# Patient Record
Sex: MALE | Race: WHITE | NOT HISPANIC OR LATINO | URBAN - METROPOLITAN AREA
[De-identification: names, ages, dates, MRNs, and addresses within clinical notes are randomized per-mention and may not be internally consistent; named-entity substitution may affect disease eponyms.]

---

## 2023-05-02 ENCOUNTER — EMERGENCY (EMERGENCY)
Facility: HOSPITAL | Age: 88
LOS: 1 days | Discharge: HOME | End: 2023-05-02
Attending: EMERGENCY MEDICINE | Admitting: EMERGENCY MEDICINE
Payer: MEDICARE

## 2023-05-02 VITALS
SYSTOLIC BLOOD PRESSURE: 136 MMHG | DIASTOLIC BLOOD PRESSURE: 71 MMHG | HEART RATE: 75 BPM | TEMPERATURE: 98 F | OXYGEN SATURATION: 96 % | RESPIRATION RATE: 18 BRPM

## 2023-05-02 VITALS
HEART RATE: 87 BPM | OXYGEN SATURATION: 96 % | DIASTOLIC BLOOD PRESSURE: 74 MMHG | SYSTOLIC BLOOD PRESSURE: 119 MMHG | HEIGHT: 70 IN | WEIGHT: 145.06 LBS | TEMPERATURE: 98 F | RESPIRATION RATE: 18 BRPM

## 2023-05-02 DIAGNOSIS — R42 DIZZINESS AND GIDDINESS: ICD-10-CM

## 2023-05-02 DIAGNOSIS — J98.11 ATELECTASIS: ICD-10-CM

## 2023-05-02 DIAGNOSIS — R55 SYNCOPE AND COLLAPSE: ICD-10-CM

## 2023-05-02 LAB
ALBUMIN SERPL ELPH-MCNC: 3.5 G/DL — SIGNIFICANT CHANGE UP (ref 3.3–5)
ALP SERPL-CCNC: 127 U/L — HIGH (ref 40–120)
ALT FLD-CCNC: 9 U/L — LOW (ref 10–45)
ANION GAP SERPL CALC-SCNC: 10 MMOL/L — SIGNIFICANT CHANGE UP (ref 5–17)
APPEARANCE UR: CLEAR — SIGNIFICANT CHANGE UP
AST SERPL-CCNC: 15 U/L — SIGNIFICANT CHANGE UP (ref 10–40)
BASOPHILS # BLD AUTO: 0.02 K/UL — SIGNIFICANT CHANGE UP (ref 0–0.2)
BASOPHILS NFR BLD AUTO: 0.2 % — SIGNIFICANT CHANGE UP (ref 0–2)
BILIRUB SERPL-MCNC: 0.3 MG/DL — SIGNIFICANT CHANGE UP (ref 0.2–1.2)
BILIRUB UR-MCNC: NEGATIVE — SIGNIFICANT CHANGE UP
BUN SERPL-MCNC: 16 MG/DL — SIGNIFICANT CHANGE UP (ref 7–23)
CALCIUM SERPL-MCNC: 9 MG/DL — SIGNIFICANT CHANGE UP (ref 8.4–10.5)
CHLORIDE SERPL-SCNC: 105 MMOL/L — SIGNIFICANT CHANGE UP (ref 96–108)
CO2 SERPL-SCNC: 21 MMOL/L — LOW (ref 22–31)
COLOR SPEC: YELLOW — SIGNIFICANT CHANGE UP
CREAT SERPL-MCNC: 1.03 MG/DL — SIGNIFICANT CHANGE UP (ref 0.5–1.3)
DIFF PNL FLD: NEGATIVE — SIGNIFICANT CHANGE UP
EGFR: 69 ML/MIN/1.73M2 — SIGNIFICANT CHANGE UP
EOSINOPHIL # BLD AUTO: 0.05 K/UL — SIGNIFICANT CHANGE UP (ref 0–0.5)
EOSINOPHIL NFR BLD AUTO: 0.4 % — SIGNIFICANT CHANGE UP (ref 0–6)
GLUCOSE SERPL-MCNC: 121 MG/DL — HIGH (ref 70–99)
GLUCOSE UR QL: NEGATIVE — SIGNIFICANT CHANGE UP
HCT VFR BLD CALC: 33.9 % — LOW (ref 39–50)
HGB BLD-MCNC: 11.1 G/DL — LOW (ref 13–17)
IMM GRANULOCYTES NFR BLD AUTO: 0.6 % — SIGNIFICANT CHANGE UP (ref 0–0.9)
KETONES UR-MCNC: NEGATIVE — SIGNIFICANT CHANGE UP
LEUKOCYTE ESTERASE UR-ACNC: NEGATIVE — SIGNIFICANT CHANGE UP
LYMPHOCYTES # BLD AUTO: 0.51 K/UL — LOW (ref 1–3.3)
LYMPHOCYTES # BLD AUTO: 4.2 % — LOW (ref 13–44)
MCHC RBC-ENTMCNC: 32.7 GM/DL — SIGNIFICANT CHANGE UP (ref 32–36)
MCHC RBC-ENTMCNC: 33.4 PG — SIGNIFICANT CHANGE UP (ref 27–34)
MCV RBC AUTO: 102.1 FL — HIGH (ref 80–100)
MONOCYTES # BLD AUTO: 1.08 K/UL — HIGH (ref 0–0.9)
MONOCYTES NFR BLD AUTO: 8.8 % — SIGNIFICANT CHANGE UP (ref 2–14)
NEUTROPHILS # BLD AUTO: 10.52 K/UL — HIGH (ref 1.8–7.4)
NEUTROPHILS NFR BLD AUTO: 85.8 % — HIGH (ref 43–77)
NITRITE UR-MCNC: NEGATIVE — SIGNIFICANT CHANGE UP
NRBC # BLD: 0 /100 WBCS — SIGNIFICANT CHANGE UP (ref 0–0)
PH UR: 5.5 — SIGNIFICANT CHANGE UP (ref 5–8)
PLATELET # BLD AUTO: 219 K/UL — SIGNIFICANT CHANGE UP (ref 150–400)
POTASSIUM SERPL-MCNC: 4.5 MMOL/L — SIGNIFICANT CHANGE UP (ref 3.5–5.3)
POTASSIUM SERPL-SCNC: 4.5 MMOL/L — SIGNIFICANT CHANGE UP (ref 3.5–5.3)
PROT SERPL-MCNC: 5.7 G/DL — LOW (ref 6–8.3)
PROT UR-MCNC: NEGATIVE MG/DL — SIGNIFICANT CHANGE UP
RBC # BLD: 3.32 M/UL — LOW (ref 4.2–5.8)
RBC # FLD: 15.9 % — HIGH (ref 10.3–14.5)
SODIUM SERPL-SCNC: 136 MMOL/L — SIGNIFICANT CHANGE UP (ref 135–145)
SP GR SPEC: >=1.03 — SIGNIFICANT CHANGE UP (ref 1–1.03)
TROPONIN T SERPL-MCNC: 0.01 NG/ML — SIGNIFICANT CHANGE UP (ref 0–0.01)
UROBILINOGEN FLD QL: 0.2 E.U./DL — SIGNIFICANT CHANGE UP
WBC # BLD: 12.25 K/UL — HIGH (ref 3.8–10.5)
WBC # FLD AUTO: 12.25 K/UL — HIGH (ref 3.8–10.5)

## 2023-05-02 PROCEDURE — 71045 X-RAY EXAM CHEST 1 VIEW: CPT | Mod: 26

## 2023-05-02 PROCEDURE — 99285 EMERGENCY DEPT VISIT HI MDM: CPT | Mod: 25

## 2023-05-02 PROCEDURE — 71045 X-RAY EXAM CHEST 1 VIEW: CPT

## 2023-05-02 PROCEDURE — 85025 COMPLETE CBC W/AUTO DIFF WBC: CPT

## 2023-05-02 PROCEDURE — 81003 URINALYSIS AUTO W/O SCOPE: CPT

## 2023-05-02 PROCEDURE — 84484 ASSAY OF TROPONIN QUANT: CPT

## 2023-05-02 PROCEDURE — 93005 ELECTROCARDIOGRAM TRACING: CPT

## 2023-05-02 PROCEDURE — 80053 COMPREHEN METABOLIC PANEL: CPT

## 2023-05-02 PROCEDURE — 36415 COLL VENOUS BLD VENIPUNCTURE: CPT

## 2023-05-02 PROCEDURE — 99284 EMERGENCY DEPT VISIT MOD MDM: CPT

## 2023-05-02 RX ORDER — SODIUM CHLORIDE 9 MG/ML
500 INJECTION INTRAMUSCULAR; INTRAVENOUS; SUBCUTANEOUS ONCE
Refills: 0 | Status: COMPLETED | OUTPATIENT
Start: 2023-05-02 | End: 2023-05-02

## 2023-05-02 RX ADMIN — SODIUM CHLORIDE 500 MILLILITER(S): 9 INJECTION INTRAMUSCULAR; INTRAVENOUS; SUBCUTANEOUS at 16:34

## 2023-05-02 RX ADMIN — SODIUM CHLORIDE 500 MILLILITER(S): 9 INJECTION INTRAMUSCULAR; INTRAVENOUS; SUBCUTANEOUS at 14:16

## 2023-05-02 NOTE — PROGRESS NOTE ADULT - SUBJECTIVE AND OBJECTIVE BOX
Electrophysiology Device Interrogation       Indication: syncope    Device model: 	Medtronic Advisa dual chamber pacemaker			                            Functioning Mode: AAIR mode swittch DDDR     Underlying Rhythm:  sinus bradycardia    Pacemaker dependency: not pacemaker dependent. underlying rhythm is sinus bradycardia ~35bpm. A paced 84%  <1%    Battery status: 3 years remaining    Interrogating parameters:   				RA			RV	  Sense:                                     2.9mV                         4.1mV  Threshold:                           0.25V@0.4ms               0.5V@0.4ms                                                                      Pacing Impedance:                  323 ohms                     380 ohms                                                                     Events/Alert:  no events.    Parameter change: 	none     Assessment: normal functioning dual chamber pacemaker. underlying rhythm is sinus bradycardia. Per patient, since moving from [place] he has not had ppm checked (last check Dec 2022). If patient wants to follow up with our EP office as an outpatient can provide him with our office number: [phone]

## 2023-05-02 NOTE — ED PROVIDER NOTE - NSFOLLOWUPINSTRUCTIONS_ED_ALL_ED_FT
[facility]  Family Medicine  [address], [address] [place] [address]  Phone: [phone]      Can take tylenol 650mg every 6hrs as needed for mild pain.  Stay well hydrated.  Return for fevers, persistent vomit, uncontrolled pain, worsening breathing, worsening lightheaded.  Follow up with primary doctor within 1-2 days.   Follow up with cardiologist. Can call [phone] ([facility]) to schedule appointment.     Syncope    Syncope refers to a condition in which a person temporarily loses consciousness. Syncope may also be called fainting or passing out. It is caused by a sudden decrease in blood flow to the brain. Even though most causes of syncope are not dangerous, syncope can be a sign of a serious medical problem. Your health care provider may do tests to find the reason why you are having syncope.    Signs that you may be about to faint include:  Feeling dizzy or light-headed.  Feeling nauseous.  Seeing all white or all black in your field of vision.  Having cold, clammy skin.  If you faint, get medical help right away.   Call your local emergency services (911 in the [place] Do not drive yourself to the hospital.    Follow these instructions at home:  Pay attention to any changes in your symptoms. Take these actions to stay safe and to help relieve your symptoms:    Lifestyle     Do not drive, use machinery, or play sports until your health care provider says it is okay. Do not drink alcohol. Do not use any products that contain nicotine or tobacco, such as cigarettes and e-cigarettes. If you need help quitting, ask your health care provider. Drink enough fluid to keep your urine pale yellow.    General instructions     Take over-the-counter and prescription medicines only as told by your health care provider. If you are taking blood pressure or heart medicine, get up slowly and take several minutes to sit and then stand. This can reduce dizziness or light-headedness. Have someone stay with you until you feel stable. If you start to feel like you might faint, lie down right away and raise (elevate) your feet above the level of your heart. Breathe deeply and steadily. Wait until all the symptoms have passed. Keep all follow-up visits as told by your health care provider. This is important.   Get help right away if you:  Have a severe headache.  Faint once or repeatedly.  Have pain in your chest, abdomen, or back.  Have a very fast or irregular heartbeat (palpitations).  Have pain when you breathe.  Are bleeding from your mouth or rectum, or you have black or tarry stool.  Have a seizure.  Are confused.  Have trouble walking.  Have severe weakness.  Have vision problems.  These symptoms may represent a serious problem that is an emergency. Do not wait to see if your symptoms will go away. Get medical help right away. Call your local emergency services (911 in the [place] Do not drive yourself to the hospital.

## 2023-05-02 NOTE — ED ADULT TRIAGE NOTE - OTHER COMPLAINTS
per EMS was found on the toilet, "passed out." pt denies falling, Cp or SOB. pt coming from assissted living

## 2023-05-02 NOTE — ED ADULT NURSE NOTE - INTERVENTIONS DEFINITIONS
[place] to call system/Call bell, personal items and telephone within reach/Instruct patient to call for assistance/Physically safe environment: no spills, clutter or unnecessary equipment/Provide visual cue, wrist band, yellow gown, etc.

## 2023-05-02 NOTE — ED PROVIDER NOTE - OBJECTIVE STATEMENT
89 yo pt poor historian w episode of syncope pta, pt states was feeling dizzy prior to passing out. Pt states was straining after which syncope occurred. Denies head injury, denies cp, Denies associated SOB, NVD,diaphoresis, palpitations, LE pain/swelling, focal weakness/numbness, recent travel/immobilization, abd pain, urinary complaints, f/c. Denies hitting head, LOC, vision changes, focal weakness/numbness, neck/back pain, SOB/CP, HA, abd pain, NVD. 89 yo pt poor historian w episode of syncope pta, pt states was feeling dizzy prior to passing out. Pt states was straining on the toilet after which syncope occurred. Denies head injury, denies cp, Denies associated SOB, NVD,diaphoresis, palpitations, LE pain/swelling, focal weakness/numbness, recent travel/immobilization, abd pain, urinary complaints, f/c. Denies hitting head, LOC, vision changes, focal weakness/numbness, neck/back pain, SOB/CP, HA, abd pain, NVD.

## 2023-05-02 NOTE — ED ADULT NURSE NOTE - OBJECTIVE STATEMENT
Pt presenting s/p syncopal episode. States he was constipated and was straining on the toilet attempting to defecate. States he had a successful BM, felt dizzy and passed out. Denies head strike. Denies pain. No CP/dizziness at this time. EKG in process. Pt presenting s/p syncopal episode. States he was constipated and was straining on the toilet attempting to defecate. States he had a successful BM, felt dizzy and passed out. Denies head strike. c/o L leg pain. No abdominal pain- abdomen soft non tender non distended. No CP/dizziness at this time. EKG in process. Pt presenting s/p syncopal episode. States he was constipated and was straining on the toilet attempting to defecate. States he had a successful BM, felt dizzy and passed out. Denies head strike (small abrasion noted to top of head states does not think is from this fall).  c/o L leg pain. No abdominal pain- abdomen soft non tender non distended. No CP/dizziness at this time. EKG in process. ZACK.

## 2023-05-02 NOTE — ED ADULT TRIAGE NOTE - WEIGHT IN KG
Returning patient's voicemail message. Telephone call to patient and person who answered phone indicated patient left his cell phone at [employer]. Provided this person with contact information for patient's brother/Curly.
Telephone call with patient who indicated that he heard from [facility] and his elevated toilet seat is going to be delivered on Friday.
65.8

## 2023-05-02 NOTE — ED PROVIDER NOTE - CLINICAL SUMMARY MEDICAL DECISION MAKING FREE TEXT BOX
hpi as above, s/p episode of syncope wo head injury, well appearing, hds, no active chest pain, sob, ddx arrythmia, uti/pna, dehydration, vasovagal  -labs, ekg, EP eval for interrogation hpi as above, s/p episode of syncope wo head injury appearing as vasovagal given straining hx,, well appearing, hds, no active chest pain, sob, ddx arrythmia, uti/pna, dehydration, vasovagal  -labs, ekg, EP eval for interrogation, keep on tele for obs    evaluated by EP w no abnormalities, kept on tele wo arrhythmias during ED stays, labs grossly normal, well appearing, will dc w supportive care, strict return prec.

## 2023-05-02 NOTE — ED PROVIDER NOTE - PATIENT PORTAL LINK FT
You can access the FollowMyHealth Patient Portal offered by [facility] by registering at the following website: [email or web address]. By joining [facility] [facility]’s FollowMyHealth portal, you will also be able to view your health information using other applications (apps) compatible with our system.

## 2023-06-01 PROBLEM — Z00.00 ENCOUNTER FOR PREVENTIVE HEALTH EXAMINATION: Status: ACTIVE | Noted: 2023-06-01

## 2023-06-13 ENCOUNTER — OUTPATIENT (OUTPATIENT)
Dept: OUTPATIENT SERVICES | Facility: HOSPITAL | Age: 88
LOS: 1 days | Discharge: HOME | End: 2023-06-13
Payer: MEDICARE

## 2023-06-13 DIAGNOSIS — C90.00 MULTIPLE MYELOMA NOT HAVING ACHIEVED REMISSION: ICD-10-CM

## 2023-06-14 ENCOUNTER — RESULT REVIEW (OUTPATIENT)
Age: 88
End: 2023-06-14

## 2023-06-14 PROCEDURE — 88321 CONSLTJ&REPRT SLD PREP ELSWR: CPT

## 2023-06-19 LAB — HEMATOPATHOLOGY REPORT: SIGNIFICANT CHANGE UP

## 2023-06-21 ENCOUNTER — APPOINTMENT (OUTPATIENT)
Dept: HEMATOLOGY ONCOLOGY | Facility: CLINIC | Age: 88
End: 2023-06-21
Payer: MEDICARE

## 2023-06-21 ENCOUNTER — NON-APPOINTMENT (OUTPATIENT)
Age: 88
End: 2023-06-21

## 2023-06-21 VITALS
WEIGHT: 146.98 LBS | BODY MASS INDEX: 23.07 KG/M2 | OXYGEN SATURATION: 98 % | SYSTOLIC BLOOD PRESSURE: 147 MMHG | RESPIRATION RATE: 18 BRPM | HEART RATE: 69 BPM | TEMPERATURE: 98.1 F | DIASTOLIC BLOOD PRESSURE: 77 MMHG | HEIGHT: 67 IN

## 2023-06-21 DIAGNOSIS — M10.9 GOUT, UNSPECIFIED: ICD-10-CM

## 2023-06-21 DIAGNOSIS — K59.00 CONSTIPATION, UNSPECIFIED: ICD-10-CM

## 2023-06-21 DIAGNOSIS — Z78.9 OTHER SPECIFIED HEALTH STATUS: ICD-10-CM

## 2023-06-21 PROCEDURE — 99205 OFFICE O/P NEW HI 60 MIN: CPT

## 2023-06-21 RX ORDER — TAMSULOSIN HYDROCHLORIDE 0.4 MG/1
0.4 CAPSULE ORAL
Refills: 0 | Status: ACTIVE | COMMUNITY
Start: 2023-06-21

## 2023-06-21 RX ORDER — DOCUSATE SODIUM 100 MG/1
100 CAPSULE, LIQUID FILLED ORAL
Refills: 0 | Status: ACTIVE | COMMUNITY
Start: 2023-06-21

## 2023-06-21 RX ORDER — MULTIVITAMIN
TABLET ORAL DAILY
Qty: 30 | Refills: 0 | Status: ACTIVE | COMMUNITY
Start: 2023-06-21

## 2023-06-21 RX ORDER — LORATADINE 10 MG
17 TABLET,DISINTEGRATING ORAL
Refills: 0 | Status: ACTIVE | COMMUNITY
Start: 2023-06-21

## 2023-06-21 NOTE — HISTORY OF PRESENT ILLNESS
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 He had uptrending paraprotein markers (M-spike 1.6, K/L 229, IgG 2057, UPEP with 260 mg monoclonal protein in 24 hours) in 2/2023, concerning for progression to multiple myeloma. He was admitted to the hospital for hypercalcemia (treated with IVF and calcitonin) and BORIS (Cr 1.71). Bone marrow biopsy on 3/27/23 revealed "extensive interstitial and diffuse bone marrow involvement" with 87% plasma cells, indicating progression. FISH panel was notable for gain (1q), monosomy(13), and gain of chromosomes 5, 9, 11, and 15 (consistent with hyperdiploidy). PET/CT on 3/21/23 was notable for a right sacral lytic lesion with extraosseous soft tissue mass (6.6 x 4.9 cm, SUV 5.5), a T9 compression deformity, subacute fracture of the left lateral 7th rib, and acute fracture of the left posterolateral 8th rib. \par \par He was started on Helen-Vd (daratumumab SC, Velcade 1 mg/m2 SC, dexamethasone 20 mg) on 3/22/23 with Valtrex prophylaxis. Per his son, he was planned to receive 10 weekly treatments, followed by monthly dosing. He received approximately 6 treatments, last on 6/5/23.\par \par His most recent labs from 5/30/23 showed mild normocytic anemia (Hgb 12.7), normal renal function (Cr 0.98), low calcium (8.1), and normal albumin (3.7). Paraprotein markers on 5/9/23 showed an IgG kappa monoclonal protein (M-spike 0.5), elevated K/L ratio (5.43), and normal IgG level (665) with immunoparesis (IgA 57, IgM 17). \par \par Family History:\par - No history of hematologic disorders or cancer\par \par Social History:\par - Recently moved to an assisted living facility ([facility])\par - Denies tobacco, alcohol, or drug use.  \par \par Review of Systems:\par Constitutional: Denies fever/chills, night sweats, unintentional weight loss, lymphadenopathy, fatigue\par HEENT: Denies vision or hearing changes\par Cardiovascular: Denies chest pain and palpitations\par Respiratory: Denies shortness of breath, cough, dyspnea on exertion\par GI: Denies nausea, vomiting, diarrhea, constipation, or abdominal pain\par : Denies urinary frequency or dysuria\par Hematologic: Denies easy bruising or bleeding, epistaxis, gingival bleeding, hematemesis, hematochezia, melena, or hematuria\par Musculoskeletal: Denies muscle/joint pain or weakness\par Neurologic: Denies headaches, weakness, numbness\par Skin: Denies rash and pruritis\par Psych: Denies recent changes in mood

## 2023-06-21 NOTE — ASSESSMENT
[FreeTextEntry1] : TRISTAN MCGRATH is a 89 year man with PMH of gout, HTN, BPH, and narcolepsy, who presents for Hematology evaluation of IgG kappa multiple myeloma.\par \par 1. IgG kappa multiple myeloma: He was previously followed at [facility] for IgG kappa MGUS. He had uptrending paraprotein markers (M-spike 1.6, K/L 229, IgG 2057, UPEP with 260 mg monoclonal protein in 24 hous) in 2/2023, concerning for progression to multiple myeloma. He was admitted to the hospital for hypercalcemia (treated with IVF and calcitonin) and BORIS (Cr 1.71). Bone marrow biopsy on 3/27/23 revealed "extensive interstitial and diffuse bone marrow involvement" with 87% plasma cells, indicating progression. FISH panel was notable for gain (1q), monosomy(13), and gain of chromosomes 5, 9, 11, and 15 (consistent with hyperdiploidy). \par - Staging: He has high-risk features, including gain(1q) and renal injury at diagnosis\par - Induction therapy: He was started on Helen-Vd (daratumumab SC, Velcade 1 mg/m2 SC, dexamethasone 20 mg) on 3/22/23 with Valtrex prophylaxis. Per his son, he was planned to receive 10 weekly treatments, followed by monthly dosing. He received approximately 6 treatments, last on 6/5/23.\par - Treatment plan: We will plan to transition to Helen-Rd per the IALNA trial as his renal function has now normalized. Will start with daratumumab biweekly given prior treatment.\par --- Daratumumab SC biweekly x 8 (C1-4), then monthly\par --- Revlimid 10 mg D1-21 (enrolled in [facility] today)\par --- Dexamethasone 10 mg \par - Monitor MM labs (SPEP, JEREL, free light chains, immunoglobulins) monthly\par - Monitor CBC and CMP weekly with treatment\par \par 2. Bones: PET/CT on 3/21/23 was notable for a right sacral lytic lesion with extraosseous soft tissue mass (6.6 x 4.9 cm, SUV 5.5), a T9 compression deformity, subacute fracture of the left lateral 7th rib, and acute fracture of the left posterolateral 8th rib. \par - Vitamin D: will check with next set of labs\par - Anti-resorptive therapy: Pending dental evaluation\par \par 3. Kidney: \par - Creatinine: 0.98 (5/30/23)\par - UPEP/JEREL: 260 mg/24 hours (2/2023)\par - Avoid nephrotoxic agents\par \par 4. Hematology: \par - CBC: Hgb 12.7 (5/30/23)\par - Monitor CBC weekly with treatment\par \par 5. VTE: No history of VTE. \par - Start aspirin 81 mg daily for prophylaxis with Revlimid treatment\par \par 6. Infections: No recent or recurrent infections. He has evidence of immunoparesis (low IgA and IgM).\par - IgG level: 665 (5/30/23)\par - Continue valacyclovir 500 mg daily for antiviral prophylaxis\par \par 7. Psych: History of narcolepsy, now off medications for unclear reasons\par - Psych referral per patient and son request

## 2023-06-26 ENCOUNTER — RESULT REVIEW (OUTPATIENT)
Age: 88
End: 2023-06-26

## 2023-06-26 ENCOUNTER — OUTPATIENT (OUTPATIENT)
Dept: OUTPATIENT SERVICES | Facility: HOSPITAL | Age: 88
LOS: 1 days | End: 2023-06-26
Payer: MEDICARE

## 2023-06-26 ENCOUNTER — APPOINTMENT (OUTPATIENT)
Dept: INFUSION THERAPY | Facility: HOSPITAL | Age: 88
End: 2023-06-26

## 2023-06-26 ENCOUNTER — APPOINTMENT (OUTPATIENT)
Dept: HEMATOLOGY ONCOLOGY | Facility: CLINIC | Age: 88
End: 2023-06-26

## 2023-06-26 DIAGNOSIS — C90.00 MULTIPLE MYELOMA NOT HAVING ACHIEVED REMISSION: ICD-10-CM

## 2023-06-26 LAB
BASOPHILS # BLD AUTO: 0.01 K/UL — SIGNIFICANT CHANGE UP (ref 0–0.2)
BASOPHILS NFR BLD AUTO: 0.2 % — SIGNIFICANT CHANGE UP (ref 0–2)
EOSINOPHIL # BLD AUTO: 0.14 K/UL — SIGNIFICANT CHANGE UP (ref 0–0.5)
EOSINOPHIL NFR BLD AUTO: 2.1 % — SIGNIFICANT CHANGE UP (ref 0–6)
HCT VFR BLD CALC: 39 % — SIGNIFICANT CHANGE UP (ref 39–50)
HGB BLD-MCNC: 12.8 G/DL — LOW (ref 13–17)
IMM GRANULOCYTES NFR BLD AUTO: 0.6 % — SIGNIFICANT CHANGE UP (ref 0–0.9)
LYMPHOCYTES # BLD AUTO: 0.82 K/UL — LOW (ref 1–3.3)
LYMPHOCYTES # BLD AUTO: 12.3 % — LOW (ref 13–44)
MCHC RBC-ENTMCNC: 32.8 G/DL — SIGNIFICANT CHANGE UP (ref 32–36)
MCHC RBC-ENTMCNC: 32.9 PG — SIGNIFICANT CHANGE UP (ref 27–34)
MCV RBC AUTO: 100.3 FL — HIGH (ref 80–100)
MONOCYTES # BLD AUTO: 0.91 K/UL — HIGH (ref 0–0.9)
MONOCYTES NFR BLD AUTO: 13.7 % — SIGNIFICANT CHANGE UP (ref 2–14)
NEUTROPHILS # BLD AUTO: 4.73 K/UL — SIGNIFICANT CHANGE UP (ref 1.8–7.4)
NEUTROPHILS NFR BLD AUTO: 71.1 % — SIGNIFICANT CHANGE UP (ref 43–77)
NRBC # BLD: 0 /100 WBCS — SIGNIFICANT CHANGE UP (ref 0–0)
PLATELET # BLD AUTO: 219 K/UL — SIGNIFICANT CHANGE UP (ref 150–400)
RBC # BLD: 3.89 M/UL — LOW (ref 4.2–5.8)
RBC # FLD: 13.6 % — SIGNIFICANT CHANGE UP (ref 10.3–14.5)
WBC # BLD: 6.65 K/UL — SIGNIFICANT CHANGE UP (ref 3.8–10.5)
WBC # FLD AUTO: 6.65 K/UL — SIGNIFICANT CHANGE UP (ref 3.8–10.5)

## 2023-06-26 PROCEDURE — 86880 COOMBS TEST DIRECT: CPT

## 2023-06-26 PROCEDURE — 86922 COMPATIBILITY TEST ANTIGLOB: CPT

## 2023-06-26 PROCEDURE — 86850 RBC ANTIBODY SCREEN: CPT

## 2023-06-26 PROCEDURE — 86870 RBC ANTIBODY IDENTIFICATION: CPT

## 2023-06-26 PROCEDURE — 86901 BLOOD TYPING SEROLOGIC RH(D): CPT

## 2023-06-26 PROCEDURE — 86900 BLOOD TYPING SEROLOGIC ABO: CPT

## 2023-06-26 PROCEDURE — 86905 BLOOD TYPING RBC ANTIGENS: CPT

## 2023-06-26 PROCEDURE — 86970 RBC PRETX INCUBATJ W/CHEMICL: CPT

## 2023-06-27 DIAGNOSIS — R11.2 NAUSEA WITH VOMITING, UNSPECIFIED: ICD-10-CM

## 2023-06-27 DIAGNOSIS — Z51.11 ENCOUNTER FOR ANTINEOPLASTIC CHEMOTHERAPY: ICD-10-CM

## 2023-06-27 LAB
25(OH)D3 SERPL-MCNC: 30 NG/ML
ALBUMIN MFR SERPL ELPH: 62.4 %
ALBUMIN SERPL ELPH-MCNC: 4.2 G/DL
ALBUMIN SERPL-MCNC: 3.9 G/DL
ALBUMIN/GLOB SERPL: 1.6 RATIO
ALP BLD-CCNC: 112 U/L
ALPHA1 GLOB MFR SERPL ELPH: 5.2 %
ALPHA1 GLOB SERPL ELPH-MCNC: 0.3 G/DL
ALPHA2 GLOB MFR SERPL ELPH: 12.3 %
ALPHA2 GLOB SERPL ELPH-MCNC: 0.8 G/DL
ALT SERPL-CCNC: 12 U/L
ANION GAP SERPL CALC-SCNC: 12 MMOL/L
AST SERPL-CCNC: 15 U/L
B-GLOBULIN MFR SERPL ELPH: 14.6 %
B-GLOBULIN SERPL ELPH-MCNC: 0.9 G/DL
B2 MICROGLOB SERPL-MCNC: 3.3 MG/L
BILIRUB SERPL-MCNC: 0.3 MG/DL
BUN SERPL-MCNC: 28 MG/DL
CALCIUM SERPL-MCNC: 8.6 MG/DL
CHLORIDE SERPL-SCNC: 105 MMOL/L
CO2 SERPL-SCNC: 23 MMOL/L
CREAT SERPL-MCNC: 1.09 MG/DL
DEPRECATED KAPPA LC FREE/LAMBDA SER: 8.58 RATIO
EGFR: 64 ML/MIN/1.73M2
GAMMA GLOB FLD ELPH-MCNC: 0.3 G/DL
GAMMA GLOB MFR SERPL ELPH: 5.5 %
GLUCOSE SERPL-MCNC: 117 MG/DL
HBV CORE IGG+IGM SER QL: NONREACTIVE
HBV SURFACE AB SER QL: REACTIVE
HBV SURFACE AG SER QL: NONREACTIVE
HCV AB SER QL: NONREACTIVE
HCV S/CO RATIO: 0.03 S/CO
IGA SER QL IEP: 54 MG/DL
IGG SER QL IEP: 657 MG/DL
IGM SER QL IEP: 18 MG/DL
INTERPRETATION SERPL IEP-IMP: NORMAL
KAPPA LC CSF-MCNC: 0.88 MG/DL
KAPPA LC SERPL-MCNC: 7.55 MG/DL
LDH SERPL-CCNC: 178 U/L
M PROTEIN MFR SERPL ELPH: NORMAL
M PROTEIN SPEC IFE-MCNC: NORMAL
MONOCLON BAND OBS SERPL: NORMAL
NT-PROBNP SERPL-MCNC: 113 PG/ML
POTASSIUM SERPL-SCNC: 4.9 MMOL/L
PROT SERPL-MCNC: 6.3 G/DL
SODIUM SERPL-SCNC: 139 MMOL/L
TROPONIN-T, HIGH SENSITIVITY: 15 NG/L
TSH SERPL-ACNC: 2.23 UIU/ML

## 2023-06-28 ENCOUNTER — NON-APPOINTMENT (OUTPATIENT)
Age: 88
End: 2023-06-28

## 2023-06-28 LAB
ALBUPE: 8.8 %
ALPHA1UPE: 37.3 %
ALPHA2UPE: 16.4 %
BETAUPE: 33.9 %
GAMMAUPE: 3.6 %
IGA 24H UR QL IFE: NORMAL
KAPPA LC 24H UR QL: PRESENT
M SPIKE RU: 22.8 %
PROT PATTERN 24H UR ELPH-IMP: NORMAL
PROT UR-MCNC: 14 MG/DL
PROT UR-MCNC: 14 MG/DL

## 2023-07-10 ENCOUNTER — RESULT REVIEW (OUTPATIENT)
Age: 88
End: 2023-07-10

## 2023-07-10 ENCOUNTER — APPOINTMENT (OUTPATIENT)
Dept: HEMATOLOGY ONCOLOGY | Facility: CLINIC | Age: 88
End: 2023-07-10

## 2023-07-10 ENCOUNTER — APPOINTMENT (OUTPATIENT)
Dept: INFUSION THERAPY | Facility: HOSPITAL | Age: 88
End: 2023-07-10

## 2023-07-10 LAB
BASOPHILS # BLD AUTO: 0.02 K/UL — SIGNIFICANT CHANGE UP (ref 0–0.2)
BASOPHILS NFR BLD AUTO: 0.3 % — SIGNIFICANT CHANGE UP (ref 0–2)
EOSINOPHIL # BLD AUTO: 0.2 K/UL — SIGNIFICANT CHANGE UP (ref 0–0.5)
EOSINOPHIL NFR BLD AUTO: 2.5 % — SIGNIFICANT CHANGE UP (ref 0–6)
HCT VFR BLD CALC: 40.2 % — SIGNIFICANT CHANGE UP (ref 39–50)
HGB BLD-MCNC: 13.3 G/DL — SIGNIFICANT CHANGE UP (ref 13–17)
IMM GRANULOCYTES NFR BLD AUTO: 0.5 % — SIGNIFICANT CHANGE UP (ref 0–0.9)
LYMPHOCYTES # BLD AUTO: 0.54 K/UL — LOW (ref 1–3.3)
LYMPHOCYTES # BLD AUTO: 6.8 % — LOW (ref 13–44)
MCHC RBC-ENTMCNC: 33.1 G/DL — SIGNIFICANT CHANGE UP (ref 32–36)
MCHC RBC-ENTMCNC: 33.2 PG — SIGNIFICANT CHANGE UP (ref 27–34)
MCV RBC AUTO: 100.2 FL — HIGH (ref 80–100)
MONOCYTES # BLD AUTO: 1.16 K/UL — HIGH (ref 0–0.9)
MONOCYTES NFR BLD AUTO: 14.6 % — HIGH (ref 2–14)
NEUTROPHILS # BLD AUTO: 6 K/UL — SIGNIFICANT CHANGE UP (ref 1.8–7.4)
NEUTROPHILS NFR BLD AUTO: 75.3 % — SIGNIFICANT CHANGE UP (ref 43–77)
NRBC # BLD: 0 /100 WBCS — SIGNIFICANT CHANGE UP (ref 0–0)
PLATELET # BLD AUTO: 182 K/UL — SIGNIFICANT CHANGE UP (ref 150–400)
RBC # BLD: 4.01 M/UL — LOW (ref 4.2–5.8)
RBC # FLD: 13.4 % — SIGNIFICANT CHANGE UP (ref 10.3–14.5)
WBC # BLD: 7.96 K/UL — SIGNIFICANT CHANGE UP (ref 3.8–10.5)
WBC # FLD AUTO: 7.96 K/UL — SIGNIFICANT CHANGE UP (ref 3.8–10.5)

## 2023-07-15 LAB
ALBUMIN MFR SERPL ELPH: 61.4 %
ALBUMIN SERPL ELPH-MCNC: 4.5 G/DL
ALBUMIN SERPL-MCNC: 3.8 G/DL
ALBUMIN/GLOB SERPL: 1.6 RATIO
ALP BLD-CCNC: 93 U/L
ALPHA1 GLOB MFR SERPL ELPH: 6 %
ALPHA1 GLOB SERPL ELPH-MCNC: 0.4 G/DL
ALPHA2 GLOB MFR SERPL ELPH: 12.3 %
ALPHA2 GLOB SERPL ELPH-MCNC: 0.8 G/DL
ALT SERPL-CCNC: 13 U/L
ANION GAP SERPL CALC-SCNC: 15 MMOL/L
AST SERPL-CCNC: 20 U/L
B-GLOBULIN MFR SERPL ELPH: 14.5 %
B-GLOBULIN SERPL ELPH-MCNC: 0.9 G/DL
BILIRUB SERPL-MCNC: 0.3 MG/DL
BUN SERPL-MCNC: 26 MG/DL
CALCIUM SERPL-MCNC: 8.7 MG/DL
CHLORIDE SERPL-SCNC: 104 MMOL/L
CO2 SERPL-SCNC: 20 MMOL/L
CREAT SERPL-MCNC: 1.24 MG/DL
DEPRECATED KAPPA LC FREE/LAMBDA SER: 4.06 RATIO
EGFR: 55 ML/MIN/1.73M2
GAMMA GLOB FLD ELPH-MCNC: 0.4 G/DL
GAMMA GLOB MFR SERPL ELPH: 5.8 %
GLUCOSE SERPL-MCNC: 128 MG/DL
IGA SER QL IEP: 49 MG/DL
IGG SER QL IEP: 629 MG/DL
IGM SER QL IEP: 27 MG/DL
INTERPRETATION SERPL IEP-IMP: NORMAL
KAPPA LC CSF-MCNC: 1.26 MG/DL
KAPPA LC SERPL-MCNC: 5.11 MG/DL
M PROTEIN MFR SERPL ELPH: NORMAL
M PROTEIN SPEC IFE-MCNC: NORMAL
MONOCLON BAND OBS SERPL: NORMAL
POTASSIUM SERPL-SCNC: 4.5 MMOL/L
PROT SERPL-MCNC: 6.2 G/DL
SODIUM SERPL-SCNC: 139 MMOL/L

## 2023-07-24 ENCOUNTER — RESULT REVIEW (OUTPATIENT)
Age: 88
End: 2023-07-24

## 2023-07-24 ENCOUNTER — APPOINTMENT (OUTPATIENT)
Dept: HEMATOLOGY ONCOLOGY | Facility: CLINIC | Age: 88
End: 2023-07-24
Payer: MEDICARE

## 2023-07-24 ENCOUNTER — APPOINTMENT (OUTPATIENT)
Dept: HEMATOLOGY ONCOLOGY | Facility: CLINIC | Age: 88
End: 2023-07-24

## 2023-07-24 ENCOUNTER — APPOINTMENT (OUTPATIENT)
Dept: INFUSION THERAPY | Facility: HOSPITAL | Age: 88
End: 2023-07-24

## 2023-07-24 VITALS
TEMPERATURE: 97.3 F | SYSTOLIC BLOOD PRESSURE: 110 MMHG | DIASTOLIC BLOOD PRESSURE: 58 MMHG | HEART RATE: 64 BPM | RESPIRATION RATE: 15 BRPM

## 2023-07-24 VITALS — DIASTOLIC BLOOD PRESSURE: 67 MMHG | SYSTOLIC BLOOD PRESSURE: 115 MMHG

## 2023-07-24 DIAGNOSIS — G47.419 NARCOLEPSY W/OUT CATAPLEXY: ICD-10-CM

## 2023-07-24 LAB
BASOPHILS # BLD AUTO: 0 K/UL — SIGNIFICANT CHANGE UP (ref 0–0.2)
BASOPHILS NFR BLD AUTO: 0 % — SIGNIFICANT CHANGE UP (ref 0–2)
DACRYOCYTES BLD QL SMEAR: SLIGHT — SIGNIFICANT CHANGE UP
EOSINOPHIL # BLD AUTO: 0.15 K/UL — SIGNIFICANT CHANGE UP (ref 0–0.5)
EOSINOPHIL NFR BLD AUTO: 2 % — SIGNIFICANT CHANGE UP (ref 0–6)
HCT VFR BLD CALC: 38.9 % — LOW (ref 39–50)
HGB BLD-MCNC: 12.9 G/DL — LOW (ref 13–17)
LYMPHOCYTES # BLD AUTO: 0.6 K/UL — LOW (ref 1–3.3)
LYMPHOCYTES # BLD AUTO: 8 % — LOW (ref 13–44)
MCHC RBC-ENTMCNC: 32.8 PG — SIGNIFICANT CHANGE UP (ref 27–34)
MCHC RBC-ENTMCNC: 33.2 G/DL — SIGNIFICANT CHANGE UP (ref 32–36)
MCV RBC AUTO: 99 FL — SIGNIFICANT CHANGE UP (ref 80–100)
MONOCYTES # BLD AUTO: 1.65 K/UL — HIGH (ref 0–0.9)
MONOCYTES NFR BLD AUTO: 22 % — HIGH (ref 2–14)
NEUTROPHILS # BLD AUTO: 5.09 K/UL — SIGNIFICANT CHANGE UP (ref 1.8–7.4)
NEUTROPHILS NFR BLD AUTO: 68 % — SIGNIFICANT CHANGE UP (ref 43–77)
NRBC # BLD: 0 /100 — SIGNIFICANT CHANGE UP (ref 0–0)
NRBC # BLD: SIGNIFICANT CHANGE UP /100 WBCS (ref 0–0)
PLAT MORPH BLD: NORMAL — SIGNIFICANT CHANGE UP
PLATELET # BLD AUTO: 276 K/UL — SIGNIFICANT CHANGE UP (ref 150–400)
POIKILOCYTOSIS BLD QL AUTO: SLIGHT — SIGNIFICANT CHANGE UP
RBC # BLD: 3.93 M/UL — LOW (ref 4.2–5.8)
RBC # FLD: 13.2 % — SIGNIFICANT CHANGE UP (ref 10.3–14.5)
RBC BLD AUTO: ABNORMAL
WBC # BLD: 7.48 K/UL — SIGNIFICANT CHANGE UP (ref 3.8–10.5)
WBC # FLD AUTO: 7.48 K/UL — SIGNIFICANT CHANGE UP (ref 3.8–10.5)

## 2023-07-24 PROCEDURE — 99215 OFFICE O/P EST HI 40 MIN: CPT

## 2023-07-24 NOTE — HISTORY OF PRESENT ILLNESS
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 He had uptrending paraprotein markers (M-spike 1.6, K/L 229, IgG 2057, UPEP with 260 mg monoclonal protein in 24 hours) in 2/2023, concerning for progression to multiple myeloma. He was admitted to the hospital for hypercalcemia (treated with IVF and calcitonin) and BORIS (Cr 1.71). Bone marrow biopsy on 3/27/23 revealed "extensive interstitial and diffuse bone marrow involvement" with 87% plasma cells, indicating progression. FISH panel was notable for gain (1q), monosomy(13), and gain of chromosomes 5, 9, 11, and 15 (consistent with hyperdiploidy). PET/CT on 3/21/23 was notable for a right sacral lytic lesion with extraosseous soft tissue mass (6.6 x 4.9 cm, SUV 5.5), a T9 compression deformity, subacute fracture of the left lateral 7th rib, and acute fracture of the left posterolateral 8th rib. \par \par He was started on Helen-Vd (daratumumab SC, Velcade 1 mg/m2 SC, dexamethasone 20 mg) on 3/22/23 with Valtrex prophylaxis. Per his son, he was planned to receive 10 weekly treatments, followed by monthly dosing. He received approximately 6 treatments, last on 6/5/23. His most labs from 5/30/23 showed mild normocytic anemia (Hgb 12.7), normal renal function (Cr 0.98), low calcium (8.1), and normal albumin (3.7). Paraprotein markers on 5/9/23 showed an IgG kappa monoclonal protein (M-spike 0.5), elevated K/L ratio (5.43), and normal IgG level (665) with immunoparesis (IgA 57, IgM 17). \par \par Interval History:\par - He transferred his care to [facility] on 6/21/23. His labs showed two IgG kappa bands (M-spike 0.3 and 0.1) and elevated K/L ratio (8.58). We transitioned his care to Helen-Rd per the ILANA trial, and he started C1D1 on 6/26/23. He is receiving daratumumab biweekly for 8 doses. Today is C2D1. \par - He is having severe fatigue. He has a history of narcolepsy and was previously on amphetamines but is now off all narcolepsy therapy. He is also depressed and is having trouble getting out of bed. \par \par Family History:\par - No history of hematologic disorders or cancer\par \par Social History:\par - Recently moved to an assisted living facility ([facility])\par - Denies tobacco, alcohol, or drug use.  \par \par Review of Systems:\par Constitutional: Denies fever/chills, night sweats, unintentional weight loss, lymphadenopathy, fatigue\par HEENT: Denies vision or hearing changes\par Cardiovascular: Denies chest pain and palpitations\par Respiratory: Denies shortness of breath, cough, dyspnea on exertion\par GI: Denies nausea, vomiting, diarrhea, constipation, or abdominal pain\par : Denies urinary frequency or dysuria\par Hematologic: Denies easy bruising or bleeding, epistaxis, gingival bleeding, hematemesis, hematochezia, melena, or hematuria\par Musculoskeletal: Denies muscle/joint pain or weakness\par Neurologic: Denies headaches, weakness, numbness\par Skin: Denies rash and pruritis\par Psych: Denies recent changes in mood  0

## 2023-07-24 NOTE — ASSESSMENT
[FreeTextEntry1] : TRISTAN MCGRATH is a 89 year man with PMH of gout, HTN, BPH, and narcolepsy, who presents for Hematology follow up of IgG kappa multiple myeloma.\par \par 1. 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 He had uptrending paraprotein markers (M-spike 1.6, K/L 229, IgG 2057, UPEP with 260 mg monoclonal protein in 24 hours) in 2/2023, concerning for progression to multiple myeloma. He was admitted to the hospital for hypercalcemia (treated with IVF and calcitonin) and BORIS (Cr 1.71). Bone marrow biopsy on 3/27/23 revealed "extensive interstitial and diffuse bone marrow involvement" with 87% plasma cells, indicating progression. FISH panel was notable for gain (1q), monosomy(13), and gain of chromosomes 5, 9, 11, and 15 (consistent with hyperdiploidy). \par - Staging: He has high-risk features, including gain(1q) and renal injury at diagnosis\par - Induction therapy: He was started on Helen-Vd (daratumumab SC, Velcade 1 mg/m2 SC, dexamethasone 20 mg) on 3/22/23 with Valtrex prophylaxis. Per his son, he was planned to receive 10 weekly treatments, followed by monthly dosing. He received approximately 6 treatments, last on 6/5/23.\par - Treatment plan: We transitioned to Helen-Rd per the ILANA trial as his renal function has now normalized. He started with daratumumab biweekly given prior treatment.\par --- Daratumumab SC biweekly x 8 (C1-4), then monthly\par --- Revlimid 10 mg D1-21\par --- Dexamethasone 10 mg \par C1D1 6/26/23. Today is C2D1. \par - Monitor MM labs (SPEP, JEREL, free light chains, immunoglobulins) monthly\par - Monitor CBC and CMP weekly with treatment\par \par 2. Bones: PET/CT on 3/21/23 was notable for a right sacral lytic lesion with extraosseous soft tissue mass (6.6 x 4.9 cm, SUV 5.5), a T9 compression deformity, subacute fracture of the left lateral 7th rib, and acute fracture of the left posterolateral 8th rib. \par - Vitamin D: 30 (6/26/23)\par - Anti-resorptive therapy: Pending dental evaluation\par \par 3. Kidney: \par - Creatinine: 1.24 (7/10/23)\par - UPEP/JEREL: positive for two Bence Goodwin protein kappa type (6/26/23)\par - Avoid nephrotoxic agents\par \par 4. Hematology: He has mild macrocytosis without cytopenias.\par - CBC: 7.96 > 13.3 < 182, .2 (7/10/23)\par - Monitor CBC weekly with treatment\par \par 5. VTE: No history of VTE. \par - Continue aspirin 81 mg daily for prophylaxis with Revlimid treatment\par \par 6. Infections: No recent or recurrent infections. He has evidence of immunoparesis (low IgA and IgM).\par - IgG level: 629 (7/10/23)\par - Continue valacyclovir 500 mg daily for antiviral prophylaxis\par \par 7. Psych: History of narcolepsy, now off medications for unclear reasons\par - Psychiatry referral

## 2023-07-24 NOTE — REASON FOR VISIT
[Follow-Up Visit] : a follow-up visit for [Formal Caregiver] : formal caregiver [FreeTextEntry2] : multiple myeloma

## 2023-07-25 LAB
ALBUMIN SERPL ELPH-MCNC: 4.2 G/DL
ALP BLD-CCNC: 107 U/L
ALT SERPL-CCNC: 18 U/L
ANION GAP SERPL CALC-SCNC: 13 MMOL/L
AST SERPL-CCNC: 17 U/L
BILIRUB SERPL-MCNC: 0.4 MG/DL
BUN SERPL-MCNC: 23 MG/DL
CALCIUM SERPL-MCNC: 8.8 MG/DL
CHLORIDE SERPL-SCNC: 106 MMOL/L
CO2 SERPL-SCNC: 19 MMOL/L
CREAT SERPL-MCNC: 1.04 MG/DL
EGFR: 68 ML/MIN/1.73M2
GLUCOSE SERPL-MCNC: 139 MG/DL
POTASSIUM SERPL-SCNC: 4.9 MMOL/L
PROT SERPL-MCNC: 6.3 G/DL
SODIUM SERPL-SCNC: 139 MMOL/L

## 2023-07-26 LAB
ALBUMIN MFR SERPL ELPH: 58.5 %
ALBUMIN SERPL-MCNC: 3.7 G/DL
ALBUMIN/GLOB SERPL: 1.4 RATIO
ALPHA1 GLOB MFR SERPL ELPH: 7.3 %
ALPHA1 GLOB SERPL ELPH-MCNC: 0.5 G/DL
ALPHA2 GLOB MFR SERPL ELPH: 14.7 %
ALPHA2 GLOB SERPL ELPH-MCNC: 0.9 G/DL
B-GLOBULIN MFR SERPL ELPH: 14.2 %
B-GLOBULIN SERPL ELPH-MCNC: 0.9 G/DL
DEPRECATED KAPPA LC FREE/LAMBDA SER: 2 RATIO
GAMMA GLOB FLD ELPH-MCNC: 0.3 G/DL
GAMMA GLOB MFR SERPL ELPH: 5.3 %
IGA SER QL IEP: 42 MG/DL
IGG SER QL IEP: 535 MG/DL
IGM SER QL IEP: 26 MG/DL
INTERPRETATION SERPL IEP-IMP: NORMAL
KAPPA LC CSF-MCNC: 1.11 MG/DL
KAPPA LC SERPL-MCNC: 2.22 MG/DL
M PROTEIN MFR SERPL ELPH: NORMAL
M PROTEIN SPEC IFE-MCNC: NORMAL
MONOCLON BAND OBS SERPL: NORMAL
PROT SERPL-MCNC: 6.3 G/DL
PROT SERPL-MCNC: 6.3 G/DL

## 2023-07-31 ENCOUNTER — APPOINTMENT (OUTPATIENT)
Dept: NEUROLOGY | Facility: CLINIC | Age: 88
End: 2023-07-31
Payer: MEDICARE

## 2023-07-31 VITALS
HEART RATE: 76 BPM | BODY MASS INDEX: 21.9 KG/M2 | HEIGHT: 70 IN | SYSTOLIC BLOOD PRESSURE: 148 MMHG | WEIGHT: 153 LBS | DIASTOLIC BLOOD PRESSURE: 77 MMHG

## 2023-07-31 DIAGNOSIS — Z87.891 PERSONAL HISTORY OF NICOTINE DEPENDENCE: ICD-10-CM

## 2023-07-31 DIAGNOSIS — G47.19 OTHER HYPERSOMNIA: ICD-10-CM

## 2023-07-31 PROCEDURE — 99205 OFFICE O/P NEW HI 60 MIN: CPT

## 2023-07-31 NOTE — REASON FOR VISIT
[Initial Evaluation] : an initial evaluation [Formal Caregiver] : formal caregiver [Source: ______] : History obtained from [unfilled]

## 2023-07-31 NOTE — HISTORY OF PRESENT ILLNESS
[FreeTextEntry1] : Mr. Garcia is a 89-year-old man with multiple myeloma, bilateral macular degeneration, moderate dementia, HTN, EDS, BPH, and possible narcolepsy presented for initial evaluation. He recently moved from [place]. He reports he has sleepiness problem starting about 3-4 years ago. He was prescribed Ritalin which was very helpful. He moved to [place] to seek better healthcare. He was in [facility] under care of Dr. Perry (geriatrist) [phone], and Dr. Lopez (Psychiatrist) [phone]. He was prescribed Duloxetine 30 mg, Vyvanse 20 mg, and Modafinil 100 mg daily. Since he moved to [place] in June, he no longer takes these medications as he ran out of refills.  He had sleep study done a while ago in [place], and do not remember the result, we do not have record to review. He denies cataplexy, sleep paralysis, or hallucination.  He was diagnosed with multiple myeloma in Jan 2023, and currently undergoing chemotherapy every 2 weeks.  He lives in assisted living facility. His daily routine is waking up at 7.30 am. Breakfast at 8-8.15 then watch TV 30 mins. His caretaker,  comes around 9.30am. then he has AM snack, and nap before lunch. Lunch time is at noon, and he naps again after lunch. Nap could be from 20-60 mins. He has physical therapy at 2pm, and activity after that. Dinner is at 5 pm. Bedtime is around 9.30-10 pm. Ms. Carrillo reports that he has good, and bad day. Some days he can do activity all afternoon, but some days he is very tired and unable to keep up with any activities. He had caffeine 2-3 cups a day and denies have it in the afternoon. He reports he wakes up at night to go the restroom at least 3 times. It is difficult for him to go back to sleep right away. Denies snoring or stop breathing at night however he sleeps alone.

## 2023-07-31 NOTE — PHYSICAL EXAM
[Over the Past 2 Weeks, Have You Felt Down, Depressed, or Hopeless?] : 1.) Over the past 2 weeks, have you felt down, depressed, or hopeless? Yes [Paresis Pronator Drift Right-Sided] : no pronator drift on the right [Paresis Pronator Drift Left-Sided] : no pronator drift on the left [Dysdiadochokinesia Bilaterally] : not present [FreeTextEntry1] : hearing aids [General Appearance - Alert] : alert [General Appearance - In No Acute Distress] : in no acute distress [Oriented To Time, Place, And Person] : oriented to person, place, and time [Impaired Insight] : insight and judgment were intact [Affect] : the affect was normal [Person] : oriented to person [Place] : oriented to place [Time] : oriented to time [Comprehension] : comprehension intact [Past History] : adequate knowledge of personal past history [Cranial Nerves Optic (II)] : visual acuity intact bilaterally,  visual fields full to confrontation, pupils equal round and reactive to light [Cranial Nerves Oculomotor (III)] : extraocular motion intact [Cranial Nerves Trigeminal (V)] : facial sensation intact symmetrically [Cranial Nerves Facial (VII)] : face symmetrical [Cranial Nerves Vestibulocochlear (VIII)] : hearing was intact bilaterally [Cranial Nerves Glossopharyngeal (IX)] : tongue and palate midline [Cranial Nerves Accessory (XI - Cranial And Spinal)] : head turning and shoulder shrug symmetric [Cranial Nerves Hypoglossal (XII)] : there was no tongue deviation with protrusion [Motor Strength] : muscle strength was normal in all four extremities [No Muscle Atrophy] : normal bulk in all four extremities [Sensation Tactile Decrease] : light touch was intact [Sensation Pain / Temperature Decrease] : pain and temperature was intact [2+] : Triceps left 2+ [3+] : Patella left 3+ [1+] : Ankle jerk left 1+ [Sclera] : the sclera and conjunctiva were normal [PERRL With Normal Accommodation] : pupils were equal in size, round, reactive to light, with normal accommodation [Extraocular Movements] : extraocular movements were intact [Outer Ear] : the ears and nose were normal in appearance [Oropharynx] : the oropharynx was normal [Neck Appearance] : the appearance of the neck was normal [] : no respiratory distress [Auscultation Breath Sounds / Voice Sounds] : lungs were clear to auscultation bilaterally [Heart Rate And Rhythm] : heart rate was normal and rhythm regular [Abnormal Walk] : normal gait [Nail Clubbing] : no clubbing  or cyanosis of the fingernails [Musculoskeletal - Swelling] : no joint swelling seen [Motor Tone] : muscle strength and tone were normal [Past-pointing] : there was no past-pointing [Tremor] : no tremor present [Plantar Reflex Right Only] : normal on the right [Plantar Reflex Left Only] : normal on the left

## 2023-07-31 NOTE — DISCUSSION/SUMMARY
[FreeTextEntry1] : Mr. Garcia is a 89 years old man with recently diagnosed multiple myeloma currently receiving chemotherapy every 2 weeks, history of narcolepsy, macular degenration bilateral, HTN, BPH, depression, and daytime sleepiness. He was prescribed Ritalin which helped his symptom. He wakes up at night multiple times to empty his bladder. Exam today is non-focal. To me it is not clear that he has narcolepsy, as he does not have other criteria for it. I will send him for sleep study, and possible MSLT. I will also refer him to see urologist for his nocturia. He has psychiatrist referral, and I will also get medical record from his doctors.  I recommend him to improve his sleep hygiene, such as no caffeine in the afternoon, reduce fluid intake before bedtime, and empty bladder before bedtime. He and his caretaker voice understanding.  I spent the time noted on the day of this patient encounter preparing for, providing and documenting the above E/M service and counseling and educate patient on differential, workup, disease course, and treatment/management. Education was provided to the patient during this encounter. All questions and concerns were answered and addressed in detail. The patient verbalized understanding and agreed to plan. Patient was advised to continue to monitor for neurologic symptoms and to notify my office or go to the nearest emergency room if there are any changes. Any orders/referrals and communications were provided as well. side effects of the above medications were discussed in detail including but not limited to applicable black box warning and teratogenicity as appropriate.  Patient was advised to bring previous records to my office.

## 2023-07-31 NOTE — REVIEW OF SYSTEMS
[As Noted in HPI] : as noted in HPI [Eyesight Problems] : eyesight problems [Loss Of Hearing] : hearing loss [Nocturia] : nocturia [Negative] : Heme/Lymph

## 2023-08-07 ENCOUNTER — APPOINTMENT (OUTPATIENT)
Dept: HEMATOLOGY ONCOLOGY | Facility: CLINIC | Age: 88
End: 2023-08-07

## 2023-08-07 ENCOUNTER — APPOINTMENT (OUTPATIENT)
Dept: INFUSION THERAPY | Facility: HOSPITAL | Age: 88
End: 2023-08-07

## 2023-08-08 ENCOUNTER — APPOINTMENT (OUTPATIENT)
Dept: UROLOGY | Facility: CLINIC | Age: 88
End: 2023-08-08

## 2023-08-17 ENCOUNTER — OUTPATIENT (OUTPATIENT)
Dept: OUTPATIENT SERVICES | Facility: HOSPITAL | Age: 88
LOS: 1 days | Discharge: HOME | End: 2023-08-17

## 2023-08-17 DIAGNOSIS — C90.00 MULTIPLE MYELOMA NOT HAVING ACHIEVED REMISSION: ICD-10-CM

## 2023-08-21 ENCOUNTER — APPOINTMENT (OUTPATIENT)
Dept: HEMATOLOGY ONCOLOGY | Facility: CLINIC | Age: 88
End: 2023-08-21
Payer: MEDICARE

## 2023-08-21 ENCOUNTER — INPATIENT (INPATIENT)
Facility: HOSPITAL | Age: 88
LOS: 8 days | Discharge: NURSING, REHAB OR LONG-TERM CARE | End: 2023-08-30
Attending: INTERNAL MEDICINE | Admitting: INTERNAL MEDICINE
Payer: MEDICARE

## 2023-08-21 ENCOUNTER — APPOINTMENT (OUTPATIENT)
Dept: INFUSION THERAPY | Facility: HOSPITAL | Age: 88
End: 2023-08-21

## 2023-08-21 ENCOUNTER — RESULT REVIEW (OUTPATIENT)
Age: 88
End: 2023-08-21

## 2023-08-21 VITALS
DIASTOLIC BLOOD PRESSURE: 51 MMHG | HEART RATE: 58 BPM | RESPIRATION RATE: 16 BRPM | SYSTOLIC BLOOD PRESSURE: 89 MMHG | OXYGEN SATURATION: 96 % | TEMPERATURE: 97 F | HEIGHT: 65.87 IN

## 2023-08-21 DIAGNOSIS — Z95.0 PRESENCE OF CARDIAC PACEMAKER: Chronic | ICD-10-CM

## 2023-08-21 DIAGNOSIS — R55 SYNCOPE AND COLLAPSE: ICD-10-CM

## 2023-08-21 DIAGNOSIS — C90.00 MULTIPLE MYELOMA NOT HAVING ACHIEVED REMISSION: ICD-10-CM

## 2023-08-21 LAB
ALBUMIN SERPL ELPH-MCNC: 2.9 G/DL — LOW (ref 3.3–5)
ALBUMIN SERPL ELPH-MCNC: 3.4 G/DL — SIGNIFICANT CHANGE UP (ref 3.3–5)
ALBUMIN SERPL ELPH-MCNC: 3.9 G/DL
ALP BLD-CCNC: 112 U/L
ALP SERPL-CCNC: 103 U/L — SIGNIFICANT CHANGE UP (ref 40–120)
ALP SERPL-CCNC: 93 U/L — SIGNIFICANT CHANGE UP (ref 40–120)
ALT FLD-CCNC: 24 U/L — SIGNIFICANT CHANGE UP (ref 4–41)
ALT FLD-CCNC: 30 U/L — SIGNIFICANT CHANGE UP (ref 4–41)
ALT SERPL-CCNC: 30 U/L
ANION GAP SERPL CALC-SCNC: 12 MMOL/L — SIGNIFICANT CHANGE UP (ref 7–14)
ANION GAP SERPL CALC-SCNC: 14 MMOL/L
ANION GAP SERPL CALC-SCNC: 14 MMOL/L — SIGNIFICANT CHANGE UP (ref 7–14)
ANION GAP SERPL CALC-SCNC: 15 MMOL/L — HIGH (ref 7–14)
APPEARANCE UR: CLEAR — SIGNIFICANT CHANGE UP
AST SERPL-CCNC: 19 U/L — SIGNIFICANT CHANGE UP (ref 4–40)
AST SERPL-CCNC: 23 U/L
AST SERPL-CCNC: 24 U/L — SIGNIFICANT CHANGE UP (ref 4–40)
B PERT DNA SPEC QL NAA+PROBE: SIGNIFICANT CHANGE UP
B PERT+PARAPERT DNA PNL SPEC NAA+PROBE: SIGNIFICANT CHANGE UP
BACTERIA # UR AUTO: ABNORMAL /HPF
BASOPHILS # BLD AUTO: 0.02 K/UL — SIGNIFICANT CHANGE UP (ref 0–0.2)
BASOPHILS # BLD AUTO: 0.04 K/UL — SIGNIFICANT CHANGE UP (ref 0–0.2)
BASOPHILS NFR BLD AUTO: 0.3 % — SIGNIFICANT CHANGE UP (ref 0–2)
BASOPHILS NFR BLD AUTO: 0.7 % — SIGNIFICANT CHANGE UP (ref 0–2)
BILIRUB SERPL-MCNC: 0.6 MG/DL — SIGNIFICANT CHANGE UP (ref 0.2–1.2)
BILIRUB SERPL-MCNC: 0.8 MG/DL — SIGNIFICANT CHANGE UP (ref 0.2–1.2)
BILIRUB SERPL-MCNC: 1.1 MG/DL
BILIRUB UR-MCNC: ABNORMAL
BLOOD GAS VENOUS COMPREHENSIVE RESULT: SIGNIFICANT CHANGE UP
BORDETELLA PARAPERTUSSIS (RAPRVP): SIGNIFICANT CHANGE UP
BUN SERPL-MCNC: 21 MG/DL — SIGNIFICANT CHANGE UP (ref 7–23)
BUN SERPL-MCNC: 23 MG/DL
BUN SERPL-MCNC: 24 MG/DL — HIGH (ref 7–23)
BUN SERPL-MCNC: 24 MG/DL — HIGH (ref 7–23)
C PNEUM DNA SPEC QL NAA+PROBE: SIGNIFICANT CHANGE UP
CA-I BLD-SCNC: 1 MMOL/L — LOW (ref 1.15–1.29)
CALCIUM SERPL-MCNC: 7.2 MG/DL — LOW (ref 8.4–10.5)
CALCIUM SERPL-MCNC: 7.2 MG/DL — LOW (ref 8.4–10.5)
CALCIUM SERPL-MCNC: 7.7 MG/DL — LOW (ref 8.4–10.5)
CALCIUM SERPL-MCNC: 7.8 MG/DL
CHLORIDE SERPL-SCNC: 103 MMOL/L
CHLORIDE SERPL-SCNC: 103 MMOL/L — SIGNIFICANT CHANGE UP (ref 98–107)
CHLORIDE SERPL-SCNC: 104 MMOL/L — SIGNIFICANT CHANGE UP (ref 98–107)
CHLORIDE SERPL-SCNC: 107 MMOL/L — SIGNIFICANT CHANGE UP (ref 98–107)
CO2 SERPL-SCNC: 18 MMOL/L
CO2 SERPL-SCNC: 18 MMOL/L — LOW (ref 22–31)
CO2 SERPL-SCNC: 18 MMOL/L — LOW (ref 22–31)
CO2 SERPL-SCNC: 19 MMOL/L — LOW (ref 22–31)
COLOR SPEC: SIGNIFICANT CHANGE UP
CREAT SERPL-MCNC: 1.22 MG/DL — SIGNIFICANT CHANGE UP (ref 0.5–1.3)
CREAT SERPL-MCNC: 1.24 MG/DL — SIGNIFICANT CHANGE UP (ref 0.5–1.3)
CREAT SERPL-MCNC: 1.32 MG/DL
CREAT SERPL-MCNC: 1.39 MG/DL — HIGH (ref 0.5–1.3)
DIFF PNL FLD: NEGATIVE — SIGNIFICANT CHANGE UP
EGFR: 48 ML/MIN/1.73M2 — LOW
EGFR: 51 ML/MIN/1.73M2
EGFR: 55 ML/MIN/1.73M2 — LOW
EGFR: 56 ML/MIN/1.73M2 — LOW
EOSINOPHIL # BLD AUTO: 0.34 K/UL — SIGNIFICANT CHANGE UP (ref 0–0.5)
EOSINOPHIL # BLD AUTO: 0.41 K/UL — SIGNIFICANT CHANGE UP (ref 0–0.5)
EOSINOPHIL NFR BLD AUTO: 4.9 % — SIGNIFICANT CHANGE UP (ref 0–6)
EOSINOPHIL NFR BLD AUTO: 7.5 % — HIGH (ref 0–6)
EPI CELLS # UR: PRESENT
FLUAV SUBTYP SPEC NAA+PROBE: SIGNIFICANT CHANGE UP
FLUBV RNA SPEC QL NAA+PROBE: SIGNIFICANT CHANGE UP
GLUCOSE SERPL-MCNC: 102 MG/DL — HIGH (ref 70–99)
GLUCOSE SERPL-MCNC: 133 MG/DL — HIGH (ref 70–99)
GLUCOSE SERPL-MCNC: 156 MG/DL
GLUCOSE SERPL-MCNC: 162 MG/DL — HIGH (ref 70–99)
GLUCOSE UR QL: NEGATIVE MG/DL — SIGNIFICANT CHANGE UP
HADV DNA SPEC QL NAA+PROBE: SIGNIFICANT CHANGE UP
HCOV 229E RNA SPEC QL NAA+PROBE: SIGNIFICANT CHANGE UP
HCOV HKU1 RNA SPEC QL NAA+PROBE: SIGNIFICANT CHANGE UP
HCOV NL63 RNA SPEC QL NAA+PROBE: SIGNIFICANT CHANGE UP
HCOV OC43 RNA SPEC QL NAA+PROBE: SIGNIFICANT CHANGE UP
HCT VFR BLD CALC: 34.1 % — LOW (ref 39–50)
HCT VFR BLD CALC: 36.2 % — LOW (ref 39–50)
HGB BLD-MCNC: 11.3 G/DL — LOW (ref 13–17)
HGB BLD-MCNC: 12 G/DL — LOW (ref 13–17)
HMPV RNA SPEC QL NAA+PROBE: SIGNIFICANT CHANGE UP
HPIV1 RNA SPEC QL NAA+PROBE: SIGNIFICANT CHANGE UP
HPIV2 RNA SPEC QL NAA+PROBE: SIGNIFICANT CHANGE UP
HPIV3 RNA SPEC QL NAA+PROBE: SIGNIFICANT CHANGE UP
HPIV4 RNA SPEC QL NAA+PROBE: SIGNIFICANT CHANGE UP
IANC: 4.65 K/UL — SIGNIFICANT CHANGE UP (ref 1.8–7.4)
IMM GRANULOCYTES NFR BLD AUTO: 0.5 % — SIGNIFICANT CHANGE UP (ref 0–0.9)
IMM GRANULOCYTES NFR BLD AUTO: 1.3 % — HIGH (ref 0–0.9)
KETONES UR-MCNC: ABNORMAL MG/DL
LEUKOCYTE ESTERASE UR-ACNC: ABNORMAL
LYMPHOCYTES # BLD AUTO: 0.4 K/UL — LOW (ref 1–3.3)
LYMPHOCYTES # BLD AUTO: 0.48 K/UL — LOW (ref 1–3.3)
LYMPHOCYTES # BLD AUTO: 5.8 % — LOW (ref 13–44)
LYMPHOCYTES # BLD AUTO: 8.8 % — LOW (ref 13–44)
M PNEUMO DNA SPEC QL NAA+PROBE: SIGNIFICANT CHANGE UP
MAGNESIUM SERPL-MCNC: 2 MG/DL — SIGNIFICANT CHANGE UP (ref 1.6–2.6)
MAGNESIUM SERPL-MCNC: 2 MG/DL — SIGNIFICANT CHANGE UP (ref 1.6–2.6)
MCHC RBC-ENTMCNC: 32.2 PG — SIGNIFICANT CHANGE UP (ref 27–34)
MCHC RBC-ENTMCNC: 32.5 PG — SIGNIFICANT CHANGE UP (ref 27–34)
MCHC RBC-ENTMCNC: 33.1 G/DL — SIGNIFICANT CHANGE UP (ref 32–36)
MCHC RBC-ENTMCNC: 33.1 GM/DL — SIGNIFICANT CHANGE UP (ref 32–36)
MCV RBC AUTO: 97.1 FL — SIGNIFICANT CHANGE UP (ref 80–100)
MCV RBC AUTO: 98 FL — SIGNIFICANT CHANGE UP (ref 80–100)
MONOCYTES # BLD AUTO: 0.95 K/UL — HIGH (ref 0–0.9)
MONOCYTES # BLD AUTO: 1.38 K/UL — HIGH (ref 0–0.9)
MONOCYTES NFR BLD AUTO: 17.4 % — HIGH (ref 2–14)
MONOCYTES NFR BLD AUTO: 20.1 % — HIGH (ref 2–14)
NEUTROPHILS # BLD AUTO: 3.56 K/UL — SIGNIFICANT CHANGE UP (ref 1.8–7.4)
NEUTROPHILS # BLD AUTO: 4.65 K/UL — SIGNIFICANT CHANGE UP (ref 1.8–7.4)
NEUTROPHILS NFR BLD AUTO: 65.1 % — SIGNIFICANT CHANGE UP (ref 43–77)
NEUTROPHILS NFR BLD AUTO: 67.6 % — SIGNIFICANT CHANGE UP (ref 43–77)
NITRITE UR-MCNC: NEGATIVE — SIGNIFICANT CHANGE UP
NRBC # BLD: 0 /100 WBCS — SIGNIFICANT CHANGE UP (ref 0–0)
NRBC # BLD: 0 /100 WBCS — SIGNIFICANT CHANGE UP (ref 0–0)
NRBC # FLD: 0 K/UL — SIGNIFICANT CHANGE UP (ref 0–0)
PH UR: 5.5 — SIGNIFICANT CHANGE UP (ref 5–8)
PHOSPHATE SERPL-MCNC: 1.9 MG/DL — LOW (ref 2.5–4.5)
PHOSPHATE SERPL-MCNC: 2.3 MG/DL — LOW (ref 2.5–4.5)
PLATELET # BLD AUTO: 129 K/UL — LOW (ref 150–400)
PLATELET # BLD AUTO: 151 K/UL — SIGNIFICANT CHANGE UP (ref 150–400)
POTASSIUM SERPL-MCNC: 3.8 MMOL/L — SIGNIFICANT CHANGE UP (ref 3.5–5.3)
POTASSIUM SERPL-MCNC: 3.8 MMOL/L — SIGNIFICANT CHANGE UP (ref 3.5–5.3)
POTASSIUM SERPL-MCNC: 3.9 MMOL/L — SIGNIFICANT CHANGE UP (ref 3.5–5.3)
POTASSIUM SERPL-SCNC: 3.7 MMOL/L
POTASSIUM SERPL-SCNC: 3.8 MMOL/L — SIGNIFICANT CHANGE UP (ref 3.5–5.3)
POTASSIUM SERPL-SCNC: 3.8 MMOL/L — SIGNIFICANT CHANGE UP (ref 3.5–5.3)
POTASSIUM SERPL-SCNC: 3.9 MMOL/L — SIGNIFICANT CHANGE UP (ref 3.5–5.3)
PROT SERPL-MCNC: 5.4 G/DL — LOW (ref 6–8.3)
PROT SERPL-MCNC: 5.7 G/DL
PROT SERPL-MCNC: 6.1 G/DL — SIGNIFICANT CHANGE UP (ref 6–8.3)
PROT UR-MCNC: 30 MG/DL
PTH-INTACT FLD-MCNC: 404 PG/ML — HIGH (ref 15–65)
RAPID RVP RESULT: SIGNIFICANT CHANGE UP
RBC # BLD: 3.48 M/UL — LOW (ref 4.2–5.8)
RBC # BLD: 3.73 M/UL — LOW (ref 4.2–5.8)
RBC # FLD: 13.8 % — SIGNIFICANT CHANGE UP (ref 10.3–14.5)
RBC # FLD: 13.9 % — SIGNIFICANT CHANGE UP (ref 10.3–14.5)
RBC CASTS # UR COMP ASSIST: SIGNIFICANT CHANGE UP /HPF (ref 0–4)
RSV RNA SPEC QL NAA+PROBE: SIGNIFICANT CHANGE UP
RV+EV RNA SPEC QL NAA+PROBE: SIGNIFICANT CHANGE UP
SARS-COV-2 RNA SPEC QL NAA+PROBE: SIGNIFICANT CHANGE UP
SODIUM SERPL-SCNC: 135 MMOL/L
SODIUM SERPL-SCNC: 136 MMOL/L — SIGNIFICANT CHANGE UP (ref 135–145)
SODIUM SERPL-SCNC: 136 MMOL/L — SIGNIFICANT CHANGE UP (ref 135–145)
SODIUM SERPL-SCNC: 138 MMOL/L — SIGNIFICANT CHANGE UP (ref 135–145)
SP GR SPEC: 1.03 — SIGNIFICANT CHANGE UP (ref 1–1.03)
UROBILINOGEN FLD QL: 1 MG/DL — SIGNIFICANT CHANGE UP (ref 0.2–1)
WBC # BLD: 5.47 K/UL — SIGNIFICANT CHANGE UP (ref 3.8–10.5)
WBC # BLD: 6.88 K/UL — SIGNIFICANT CHANGE UP (ref 3.8–10.5)
WBC # FLD AUTO: 5.47 K/UL — SIGNIFICANT CHANGE UP (ref 3.8–10.5)
WBC # FLD AUTO: 6.88 K/UL — SIGNIFICANT CHANGE UP (ref 3.8–10.5)
WBC UR QL: SIGNIFICANT CHANGE UP /HPF (ref 0–5)

## 2023-08-21 PROCEDURE — 99285 EMERGENCY DEPT VISIT HI MDM: CPT

## 2023-08-21 PROCEDURE — 99215 OFFICE O/P EST HI 40 MIN: CPT

## 2023-08-21 PROCEDURE — 71045 X-RAY EXAM CHEST 1 VIEW: CPT | Mod: 26

## 2023-08-21 RX ORDER — SODIUM CHLORIDE 9 MG/ML
1000 INJECTION INTRAMUSCULAR; INTRAVENOUS; SUBCUTANEOUS ONCE
Refills: 0 | Status: COMPLETED | OUTPATIENT
Start: 2023-08-21 | End: 2023-08-21

## 2023-08-21 RX ORDER — SODIUM CHLORIDE 9 MG/ML
1000 INJECTION INTRAMUSCULAR; INTRAVENOUS; SUBCUTANEOUS
Refills: 0 | Status: COMPLETED | OUTPATIENT
Start: 2023-08-21 | End: 2023-08-22

## 2023-08-21 RX ORDER — ACETAMINOPHEN 500 MG
1000 TABLET ORAL ONCE
Refills: 0 | Status: COMPLETED | OUTPATIENT
Start: 2023-08-21 | End: 2023-08-21

## 2023-08-21 RX ADMIN — Medication 400 MILLIGRAM(S): at 15:28

## 2023-08-21 RX ADMIN — SODIUM CHLORIDE 1000 MILLILITER(S): 9 INJECTION INTRAMUSCULAR; INTRAVENOUS; SUBCUTANEOUS at 16:45

## 2023-08-21 NOTE — H&P ADULT - REASON FOR ADMISSION
Syncope Star Wedge Flap Text: The defect edges were debeveled with a #15 scalpel blade.  Given the location of the defect, shape of the defect and the proximity to free margins a star wedge flap was deemed most appropriate.  Using a sterile surgical marker, an appropriate rotation flap was drawn incorporating the defect and placing the expected incisions within the relaxed skin tension lines where possible. The area thus outlined was incised deep to adipose tissue with a #15 scalpel blade.  The skin margins were undermined to an appropriate distance in all directions utilizing iris scissors.

## 2023-08-21 NOTE — ED ADULT NURSE NOTE - NSFALLUNIVINTERV_ED_ALL_ED
Bed/Stretcher in lowest position, wheels locked, appropriate side rails in place/Call bell, personal items and telephone in reach/Instruct patient to call for assistance before getting out of bed/chair/stretcher/Non-slip footwear applied when patient is off stretcher/[place] to call system/Physically safe environment - no spills, clutter or unnecessary equipment/Purposeful proactive rounding/Room/bathroom lighting operational, light cord in reach

## 2023-08-21 NOTE — H&P ADULT - ASSESSMENT
[ x ]  Lab studies personally reviewed  [ x ]  Radiology personally reviewed  [ x ]  Old records personally reviewed    89 year old male, with past history significant for Multiple myeloma, Hypertension and Dyslipidemia, presented to the ED secondary to loss of consciousness.  Diagnosed with Syncope in the ED. [ x ]  Lab studies personally reviewed  [ x ]  Radiology personally reviewed  [ x ]  Old records personally reviewed    89 year old male, with past history significant for Multiple myeloma, Hypertension, Dyslipidemia, Macular degeneration, Hearing difficulty (bilateral), and Gait difficulty (uses walker), presented to the ED secondary to loss of consciousness.  Diagnosed with Syncope in the ED.

## 2023-08-21 NOTE — H&P ADULT - PROBLEM SELECTOR PLAN 1
- likely due to orthostatic changes due to dehydration caused by diarrhea x past few days, inadequate oral hydration, electrolyte imbalances  - no gross signs of infection appreciated; RVP, COVID-19 PCR, UA CXR negative for signs of infection  - BP - 89/51 initially in the ED (reportedly 72/42 at the time of LOC)  - no head or body trauma  - CT scan of head for any acute findings.  ECG as noted above  - troponin = 28, pH = 7.29, pCO2 = 42, Lactate = 2.3, monocytosis of 20.1  - s/p one liter NaCl in the ED; additional IVF of NaCl one liter x 10 hours prescribed.  Evaluate need for additional IVF  - f/u orthostatic vital signs (ordered)  - f/u TTE (ordered)  - f/u blood cultures x 2, urine culture (in progress)  - f/u AM lab-work  - fall precautions, aspiration precautions, HOB elevation

## 2023-08-21 NOTE — ED PROVIDER NOTE - ATTENDING CONTRIBUTION TO CARE
89yo M with ho MM currently undergoing chemo pw syncope. pt was in the bathroom had large eipsode of diarrhea and syncopized, per caretaker slumped over and was unresponsive for several  seconds. did not fall or hit head, caretaker was at bedside. pt does not recall episode. denies chest pain, fevers, chills, sob. per caretaker pt was stating all day that he didn't feel right but could not be more explicit.   on ems arrival pt was hypotensive, fluids administered  pt currently with no complaints, BP improved in ed, abd elza,   will eval for acs, will interrogate ppm, ro sepsis, labs, cultures, trop, ua, cxr, ekg, reassess

## 2023-08-21 NOTE — H&P ADULT - HISTORY OF PRESENT ILLNESS
89 year old male, with past history significant for Multiple myeloma, Hypertension and Dyslipidemia, presented to the ED secondary to loss of consciousness.  Seen and evaluated at bedside;    Vital signs upon ED presentation as follows: BP = 89/51, HR = 58, RR = 16, T = 36.3 C (97.4 F), O2 Sat = 96% on 4L NC.  Diagnosed with Syncope and prescribed NaCl one liter and Tylenol 1 gram in the ED. 89 year old male, with past history significant for Multiple myeloma, Hypertension and Dyslipidemia, presented to the ED secondary to loss of consciousness.  Seen and evaluated at bedside with health care  at bedside; NAD.  Forgetful.      Vital signs upon ED presentation as follows: BP = 89/51, HR = 58, RR = 16, T = 36.3 C (97.4 F), O2 Sat = 96% on 4L NC.  Diagnosed with Syncope and prescribed NaCl one liter and Tylenol 1 gram in the ED. 89 year old male, with past history significant for Multiple myeloma, Hypertension, Dyslipidemia, Macular degeneration, Hearing difficulty (bilateral), Forgetfulness and Gait difficulty (uses walker), presented to the ED secondary to loss of consciousness.  Seen and evaluated at bedside with health care  at bedside; NAD.  Forgetful.  Patient unable to recall events relative to the syncope.  Per caregiver, patient had lab-work performed at the cancer center today, and while waiting to undergo chemotherapy, indicated wanting to use the toilet.  While on the commode, patient called for the caregiver who was standing outside the door, who went to assist the patient.  Noted to have had diarrhea.  As patient stood up, he complained of not feeling well and sat down again on the commode, slumping over and becoming unresponsive at the same time.  Caregiver caught the patient, thereby, preventing any trauma to the body.  Per caregiver, patient lost consciousness for a few seconds and appeared gray in color, with coldness of the hands.  Pulse oximetry was difficult to obtain, but blood pressure at the time = 72/42.  Patient complained of left sided head pain, which resolved after receiving Tylenol.  Additional information, as told to care provider by an individual who saw the patient over the weekend, patient had diarrhea over the weekend.  Patient does not recall this occurring.  No report of fever, chills, nausea, vomiting.  Patient reports inadequate fluid intake, but eats well.  States that he does have difficulty with short term memory.  Reports occasional palpitations.    Vital signs upon ED presentation as follows: BP = 89/51, HR = 58, RR = 16, T = 36.3 C (97.4 F), O2 Sat = 96% on 4L NC.  Diagnosed with Syncope and prescribed NaCl one liter and Tylenol 1 gram in the ED. 89 year old male, with past history significant for Multiple myeloma, Hypertension, Dyslipidemia, Macular degeneration, Hearing difficulty (bilateral), Forgetfulness and Gait difficulty (uses walker), presented to the ED, from the [facility] center, secondary to loss of consciousness.  Seen and evaluated at bedside with health care  at bedside; NAD.  Forgetful.  Patient unable to recall events relative to the syncope.  Per caregiver, patient had lab-work performed at the cancer center today, and while waiting to undergo chemotherapy, indicated wanting to use the toilet.  While on the commode, patient called for the caregiver who was standing outside the door, who went to assist the patient.  Noted to have had diarrhea.  As patient stood up, he complained of not feeling well and sat down again on the commode, slumping over and becoming unresponsive at the same time.  Caregiver caught the patient, thereby, preventing any trauma to the body.  Per caregiver, patient lost consciousness for a few seconds and appeared gray in color, with coldness of the hands.  Pulse oximetry was difficult to obtain, but blood pressure at the time = 72/42.  Patient complained of left sided head pain, which resolved after receiving Tylenol.  Additional information, as told to care provider by an individual who saw the patient over the weekend, patient had diarrhea over the weekend.  Patient does not recall this occurring.  No report of fever, chills, nausea, vomiting.  Patient reports inadequate fluid intake, but eats well.  States that he does have difficulty with short term memory.  Reports occasional palpitations.    Vital signs upon ED presentation as follows: BP = 89/51, HR = 58, RR = 16, T = 36.3 C (97.4 F), O2 Sat = 96% on 4L NC.  Diagnosed with Syncope and prescribed NaCl one liter and Tylenol 1 gram in the ED.

## 2023-08-21 NOTE — H&P ADULT - PROBLEM SELECTOR PLAN 8
- Lovenox - patient resides at [facility]  - has HHA during the days, but none at nights and weekends  - in the context of syncope, need to evaluate discharge needs  - Social Work consult in the AM (ordered); please f/u

## 2023-08-21 NOTE — H&P ADULT - NSHPREVIEWOFSYSTEMS_GEN_ALL_CORE
REVIEW OF SYSTEMS:    CONSTITUTIONAL: No weakness, fever, chills or sweating  EYES/ENT: No visual changes.  No dysphagia  NECK: No pain or stiffness  RESPIRATORY: No cough or hemoptysis.  No shortness of breath  CARDIOVASCULAR: No chest pain or palpitations.  No lower extremity edema  GASTROINTESTINAL: No epigastric or abdominal pain. No nausea, vomiting or hematemesis.  No diarrhea or constipation. No melena or hematochezia.  GENITOURINARY: No dysuria, frequency or hematuria  MUSCULOSKELETAL: No joint pain, swelling, decreased ROM, erythema, warmth  NEUROLOGICAL: No numbness or weakness  PSYCHIATRY: No anxiety, or depression.  SKIN: No itching, burning, rashes, or lesions   All other review of systems is negative unless indicated above. REVIEW OF SYSTEMS: somewhat limited since patient is forgetful    CONSTITUTIONAL: Generalized weakness.  Fatigue.  No fever, chills or sweating  EYES/ENT: No visual changes.  No dysphagia  NECK: No pain or stiffness  RESPIRATORY: No cough or hemoptysis.  No shortness of breath  CARDIOVASCULAR: Occasional palpitations.  No palpitations.  No lower extremity edema  GASTROINTESTINAL: No epigastric or abdominal pain. No nausea, vomiting or hematemesis.  No diarrhea or constipation. No melena or hematochezia.  GENITOURINARY: No dysuria, frequency or hematuria  MUSCULOSKELETAL: No joint pain, swelling, decreased ROM, erythema, warmth  NEUROLOGICAL: No numbness or weakness  PSYCHIATRY: No anxiety, or depression.  SKIN: No itching, burning, rashes, or lesions   All other review of systems is negative unless indicated above.

## 2023-08-21 NOTE — PROCEDURE NOTE - ADDITIONAL PROCEDURE DETAILS
Dual Chamber pacemaker in AAIR <--> DDDR mode   Normal sensing and pacing via iterative testing  Excellent threshold capture  2 episodes of NSVT lasting <1 second noted   No reprogramming

## 2023-08-21 NOTE — ED PROVIDER NOTE - CLINICAL SUMMARY MEDICAL DECISION MAKING FREE TEXT BOX
History of Present Illness: 89-year-old male with past medical history of multiple myeloma on active chemotherapy treatment, previous pacemaker for sinus bradycardia, hypertension, dyslipidemia, brought in by EMS due to hypotension and syncope which occurred prior to him receiving his chemotherapy treatment today.  He denies any chest pain, abdominal pain, nausea, vomiting at any point prior to or after the episode of hypotension and syncope.  He further denies any headache, vision changes, dizziness.  All other systems negative except as noted in the HPI    General: Alert, oriented to person, time, place  Psych: mood appropriate  Head: normocephalic; atraumatic  Eyes: conjunctivae clear bilaterally, sclerae anicteric  ENT: no nasal flaring, patent nares  Cardio: Regular rate and rhythm; normal heart sounds  Resp: Clear to auscultation bilaterally  GI: Abdomen soft, nontender, nondistended  Neuro: Strength 5/5 in upper and lower extremities; normal sensation  Skin: No rashes or bruising noted  MSK: Normal movement of extremities  Lymph/Vasc: No LE edema    Medical Decision Makin-year-old female actively being treated for cancer here for initial hypotension and syncope.  Repeat vital signs here show normal blood pressure and no hypoxia.  Given patient's comorbidities, he requires an initial infectious work-up including a chest x-ray, urinalysis, urine culture, blood cultures.  We will also obtain a CBC, CMP, magnesium, phosphorus.  We will continue to monitor patient vital signs here.  Electrophysiology consulted in order to assess his device.

## 2023-08-21 NOTE — H&P ADULT - NSICDXPASTMEDICALHX_GEN_ALL_CORE_FT
PAST MEDICAL HISTORY:  Dyslipidemia     Hypertension     Multiple myeloma      PAST MEDICAL HISTORY:  Dyslipidemia     Hypertension     Multiple myeloma     Narcolepsy      PAST MEDICAL HISTORY:  Degeneration macular     Dyslipidemia     Forgetfulness     History of use of hearing aid in both ears     [place] (hard of hearing)     Hypertension     Multiple myeloma     Narcolepsy     Uses walker     Vision impairment

## 2023-08-21 NOTE — H&P ADULT - NSHPPHYSICALEXAM_GEN_ALL_CORE
Vital Signs Last 24 Hrs  T(C): 36.3 (21 Aug 2023 19:56), Max: 36.6 (21 Aug 2023 16:16)  T(F): 97.4 (21 Aug 2023 19:56), Max: 97.9 (21 Aug 2023 16:16)  HR: 98 (21 Aug 2023 19:56) (58 - 98)  BP: 146/81 (21 Aug 2023 19:56) (89/51 - 146/81)  BP(mean): 66 (21 Aug 2023 15:00) (66 - 74)  RR: 16 (21 Aug 2023 19:56) (14 - 25)  SpO2: 100% (21 Aug 2023 19:56) (95% - 100%)    Parameters below as of 21 Aug 2023 19:56  Patient On (Oxygen Delivery Method): room air    =================================================== Vital Signs Last 24 Hrs  T(C): 36.3 (21 Aug 2023 19:56), Max: 36.6 (21 Aug 2023 16:16)  T(F): 97.4 (21 Aug 2023 19:56), Max: 97.9 (21 Aug 2023 16:16)  HR: 98 (21 Aug 2023 19:56) (58 - 98)  BP: 146/81 (21 Aug 2023 19:56) (89/51 - 146/81)  BP(mean): 66 (21 Aug 2023 15:00) (66 - 74)  RR: 16 (21 Aug 2023 19:56) (14 - 25)  SpO2: 100% (21 Aug 2023 19:56) (95% - 100%)    Parameters below as of 21 Aug 2023 19:56  Patient On (Oxygen Delivery Method): room air    ===================================================    PHYSICAL EXAMINATION:    APPEARANCE: Adequately groomed, adequately nourished, tired appearing male, lying propped up in stretcher in NAD  HEENT: Dry oral mucosa.  Decreased vision bilaterally.  Pupils reactive to light.  Decreased hearing B/L; hearing aids  LYMPHATIC: No lymphadenopathy appreciated  CARDIOVASCULAR: (+) S1 S2.  No JVD.  No murmurs.  No edema  RESPIRATORY: No wheezing, rhonchi, crackles appreciated  GASTROINTESTINAL: Soft.  Mild tenderness to moderate palpation at the LLQ area.  (+) BS  GENITOURINARY: No suprapubic tenderness.  No CVA tenderness B/L  EXTREMITIES: Normal range of motion.  No clubbing, cyanosis or edema  MUSCULOSKELETAL: No atrophy.  No asymmetry  SKIN: PPM palpable at L-chest area; site intact.  No rashes. No ecchymoses.  No cyanosis  PSYCHIATRIC: A&O x 3.  Mood & affect appropriate to situation  NEUROLOGICAL: Non-focal; responsive to auditory (with the help of hearing aids), tactile, painful stimuli  VASCULAR: Peripheral pulses palpable

## 2023-08-21 NOTE — H&P ADULT - NSHPLABSRESULTS_GEN_ALL_CORE
11.3   6.88  )-----------( 129      ( 21 Aug 2023 14:52 )             34.1     136  |  104  |  24<H>  ----------------------------<  133<H>       3.8   |  18<L>  |  1.24    Ca    7.2<L>      21 Aug 2023 16:42  Phos  2.3       Mg     2.00         TPro  6.1  /  Alb  3.4  /  TBili  0.8  /  DBili  x   /  AST  24  /  ALT  30  /  AlkPhos  103        14:52 - VBG - pH: 7.29  | pCO2: 42    | pO2: 21    | Lactate: 2.2      hs Troponin, T - 28 ng/L (23 @ 14:52)          Urinalysis Basic - ( 21 Aug 2023 19:13 )  Color: Dark Yellow / Appearance: Clear / S.026 / pH: 5.5  Gluc: Negative mg/dL / Ketone: Trace mg/dL  / Bili: Small / Urobili: 1.0 mg/dL   Blood: Negative / Protein: 30 mg/dL / Nitrite: Negative   Leuk Esterase: Trace / RBC: 0-1 /HPF / WBC 0-2 /HPF   Sq Epi: Present / Non Sq Epi: x / Bacteria: Few /HPF    =========================================================================        =========================================================================  . 11.3   6.88  )-----------( 129      ( 21 Aug 2023 14:52 )             34.1     136  |  104  |  24<H>  ----------------------------<  133<H>       3.8   |  18<L>  |  1.24    Ca    7.2<L>      21 Aug 2023 16:42  Phos  2.3       Mg     2.00         TPro  6.1  /  Alb  3.4  /  TBili  0.8  /  DBili  x   /  AST  24  /  ALT  30  /  AlkPhos  103        14:52 - VBG - pH: 7.29  | pCO2: 42    | pO2: 21    | Lactate: 2.2      hs Troponin, T - 28 ng/L (23 @ 14:52)          Urinalysis Basic - ( 21 Aug 2023 19:13 )  Color: Dark Yellow / Appearance: Clear / S.026 / pH: 5.5  Gluc: Negative mg/dL / Ketone: Trace mg/dL  / Bili: Small / Urobili: 1.0 mg/dL   Blood: Negative / Protein: 30 mg/dL / Nitrite: Negative   Leuk Esterase: Trace / RBC: 0-1 /HPF / WBC 0-2 /HPF   Sq Epi: Present / Non Sq Epi: x / Bacteria: Few /HPF    =============================================================================    ECG = sinus rhythm w/ 1st degree AVB at 60 bpm, QTc = 462, Q-wave in V3, V4, TWI in V3    =============================================================================  .

## 2023-08-21 NOTE — H&P ADULT - PROBLEM SELECTOR PLAN 5
- calcium = 7.2, albumin = 3.4; calculated calcium = 7.7.  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

## 2023-08-21 NOTE — ED PROVIDER NOTE - PROGRESS NOTE DETAILS
Regina Caballero MD attending physician.  Informed by telemetry patient was known trigeminy.  Check discharged magnesium was 2.0 patient does have trigeminy otherwise no is in sinus.  Patient is without any complaints laying in bed.  Blood pressure is 105/60.  Pulse is 62 he had trigeminy for about a minute at a time from multiple episodes.  Given he came in for syncope patient will be admitted to the hospital I explained that to him so he can be monitored

## 2023-08-21 NOTE — H&P ADULT - PROBLEM SELECTOR PLAN 7
- history of same and currently undergoing chemotherapy  - being followed by [facility]  - Oncology consult in the AM (e-mailed); please f/u

## 2023-08-21 NOTE — H&P ADULT - PROBLEM SELECTOR PLAN 4
- dry oral mucosa, ketonuria, hemoconcentrated lab-work, elevated lactate (2.3)  - in the context of GI fluid loss, inadequate oral intake  - fluid repletion as above  - f/u electrolytes, renal function  - encourage oral hydration, as tolerated

## 2023-08-21 NOTE — H&P ADULT - PROBLEM SELECTOR PLAN 6
- phosphorus = 2.3 (repeated at 1.9)  - also hypocalcemic at 7.7 (corrected value) and with PTH elevation at 404  - repleted w/ K-phos 15 mmol  - f/u for resolution  - f/u vitamin D level in the AM  - may need Endocrinology eval

## 2023-08-21 NOTE — ED ADULT NURSE NOTE - OBJECTIVE STATEMENT
Facilitator RN: Patient presents to ED from MD office for possible syncopal episode. Aide at bedside. He is AA&Ox4, in no acute distress, calm, cooperative. Patient is hard of hearing. As per aide, patient was using bathroom and she was unable to get him to verbally respond as he was seen "slumped over" sitting on toilet. Patient states he does not really recall what happened. Denies fall. Denies CP, dizziness, SOB, dyspnea, N/V, fevers. Respirations even, unlabored on room air, saturating 95% to 99%. No pallor, no cyanosis. History of multiple myeloma on chemo. Arrives with 22G IV in right forearm with 1 L NS running from office as per EMS. Placed on cardiac monitor showing NSR. 20G IV placed left AC, labs drawn and sent. Safety maintained. Endorsed to primary RN Sarika.

## 2023-08-21 NOTE — CHART NOTE - NSCHARTNOTEFT_GEN_A_CORE
89-year man with PMH of gout, HTN, BPH, and narcolepsy, and IgG kappa multiple myeloma who presented to [facility] for hypotension. Pt. was previously followed at [facility] for IgG kappa MGUS. Bone marrow biopsy on 3/27/23 revealed "extensive interstitial and diffuse bone marrow involvement" with 87% plasma cells, indicating progression.    PET/CT on 3/21/23 was notable for a right sacral lytic lesion with extraosseous soft tissue mass. Pt. was started on Helen-Vd (daratumumab SC, Velcade 1 mg/m2 SC, dexamethasone 20 mg), and transferred his care to [facility] on 6/21/23.  He has since been receiving daratumumab every 2 weeks for 8 doses. He was scheduled for his 5th biweekly dose of daratumumab (C3D1) but was canceled today 8/21/23 at the treatment room due to severe fatigue, and hypotension in the setting of a possibly vasovagal event on toilet. PPM interrogation normal.  Currently, he feels fatigued, but denies specific complaints of pain. His BP has improved on IVFs. RVP negative and blood and urine cultures pending.    No immediate Hematology concerns, but full consult note to follow tomorrow.      Aryles Hedjar, MD, PGY-6  Hematology/Oncology Fellow  [facility]  Pager: [phone]  After 5PM and on weekends and holidays, please call the inpatient fellow on call.

## 2023-08-21 NOTE — H&P ADULT - NSHPSOCIALHISTORY_GEN_ALL_CORE
SOCIAL HISTORY:    Marital Status:  (  )    (  ) Single        (  )        (  )        (  )   Lives with:          (  ) Alone      (  ) Spouse     (  ) Children         (  ) Parents             (  ) Other    No history of smoking  No history of alcohol abuse  No history of illegal drug use    Occupation: SOCIAL HISTORY:    Marital Status:  (  )    (  ) Single        (  )        (  )        (  )   Lives with:          (  ) Alone      (  ) Spouse     (  ) Children         (  ) Parents             (  ) Other    No history of smoking  No history of alcohol abuse  No history of illegal drug use    Occupation: Retired SOCIAL HISTORY:    Marital Status:  (  )    (  ) Single        (  )        (  )        (  )   Lives with:          (  ) Alone      (  ) Spouse     (  ) Children         (  ) Parents             ( x ) Other/Co-residents at [facility]    No history of smoking  No history of alcohol abuse  No history of illegal drug use    Occupation: Retired SOCIAL HISTORY:  Marital Status:  (  )    (  ) Single        (  )        (  )        (  )   Lives with:          (  ) Alone      (  ) Spouse     (  ) Children         (  ) Parents             ( x ) Other/Co-residents at [facility]    No history of smoking  No history of alcohol abuse  No history of illegal drug use    Occupation: Retired    Patient has HHA during the days; none at nights and weekends, per care provider at bedside

## 2023-08-21 NOTE — ED ADULT TRIAGE NOTE - CHIEF COMPLAINT QUOTE
Pt brought in by EMS from MD office has a hx of multiple myeloma complaining of a near syncopal episode while attempting to use the restroom. Pt arrives on 4L NC placed by EMS for comfort. Pt denies chest pain, sob, n/v/d, fever or chills. pt arrives with 18g IV placed at MD office with NS infusing. Pt appears pale and lethargic. pt brought to room 18.

## 2023-08-21 NOTE — HISTORY OF PRESENT ILLNESS
[de-identified] : TRISTAN MCGRATH is a 89 year man with PMH of gout, HTN, BPH, and narcolepsy, who presents for Hematology follow up of IgG kappa multiple myeloma.  He was previously followed at [facility] for IgG kappa MGUS. He had uptrending paraprotein markers (M-spike 1.6, K/L 229, IgG 2057, UPEP with 260 mg monoclonal protein in 24 hours) in 2/2023, concerning for progression to multiple myeloma. He was admitted to the hospital for hypercalcemia (treated with IVF and calcitonin) and BORIS (Cr 1.71). Bone marrow biopsy on 3/27/23 revealed "extensive interstitial and diffuse bone marrow involvement" with 87% plasma cells, indicating progression. FISH panel was notable for gain (1q), monosomy(13), and gain of chromosomes 5, 9, 11, and 15 (consistent with hyperdiploidy). PET/CT on 3/21/23 was notable for a right sacral lytic lesion with extraosseous soft tissue mass (6.6 x 4.9 cm, SUV 5.5), a T9 compression deformity, subacute fracture of the left lateral 7th rib, and acute fracture of the left posterolateral 8th rib.   He was started on Helen-Vd (daratumumab SC, Velcade 1 mg/m2 SC, dexamethasone 20 mg) on 3/22/23 with Valtrex prophylaxis. Per his son, he was planned to receive 10 weekly treatments, followed by monthly dosing. He received approximately 6 treatments, last on 6/5/23. His most labs from 5/30/23 showed mild normocytic anemia (Hgb 12.7), normal renal function (Cr 0.98), low calcium (8.1), and normal albumin (3.7). Paraprotein markers on 5/9/23 showed an IgG kappa monoclonal protein (M-spike 0.5), elevated K/L ratio (5.43), and normal IgG level (665) with immunoparesis (IgA 57, IgM 17).   He transferred his care to [facility] on 6/21/23. His labs showed two IgG kappa bands (M-spike 0.3 and 0.1) and elevated K/L ratio (8.58). We transitioned his care to [facility] per the ILANA trial, and he started C1D1 on 6/26/23.   Interval History: - He is receiving daratumumab biweekly for 8 doses. He missed his dose on 8/7/23 due to a family emergency for his health aide. Today is the 4th biweekly dose of daratumumab (C3D1). However, he had multiple large BMs in our office before treatment and reported severe fatigue. He developed hypotension in the treatment room (SBP 72/42), so treatment was cancelled and he will be sent to the [facility] ED for further evaluation.  - He reports severe fatigue. He has a history of narcolepsy and was previously on amphetamines but is now off all narcolepsy therapy. He is also depressed and is having trouble getting out of bed. I previously placed referrals to [facility] for management.    Family History: - No history of hematologic disorders or cancer  Social History: - Recently moved to an assisted living facility ([facility]) - Denies tobacco, alcohol, or drug use.    Review of Systems: Constitutional: Denies fever/chills, night sweats, unintentional weight loss, lymphadenopathy, fatigue HEENT: Denies vision or hearing changes Cardiovascular: Denies chest pain and palpitations Respiratory: Denies shortness of breath, cough, dyspnea on exertion GI: Denies nausea, vomiting, diarrhea, constipation, or abdominal pain : Denies urinary frequency or dysuria Hematologic: Denies easy bruising or bleeding, epistaxis, gingival bleeding, hematemesis, hematochezia, melena, or hematuria Musculoskeletal: Denies muscle/joint pain or weakness Neurologic: Denies headaches, weakness, numbness Skin: Denies rash and pruritis Psych: Denies recent changes in mood

## 2023-08-21 NOTE — ED PROVIDER NOTE - CHIEF COMPLAINT
[facility] Medicine Services  PROGRESS NOTE    Patient Name: Екатерина Jansen  : 1948  MRN: [id number]    Date of Admission: 2022  Primary Care Physician: Sukhdeep Mccarthy MD    Subjective   Subjective     CC:  Follow-up right shoulder pain    HPI:  Patient denies any symptoms postoperatively.  She denies any lightheadedness or other complaints.  She says her pain is reasonably controlled.    Review of Systems  No current fevers or chills  No current shortness of breath or cough  No current nausea, vomiting, or diarrhea  No current chest pain or palpitations      Objective   Objective     Vital Signs:   Temp:  [96.7 °F (35.9 °C)-97.7 °F (36.5 °C)] 97 °F (36.1 °C)  Heart Rate:  [58-75] 69  Resp:  [14-18] 18  BP: (101-126)/(50-76) 107/56        Physical Exam:  Constitutional:Awake, alert  HENT: NCAT, mucous membranes moist, neck supple  Respiratory: Clear to auscultation bilaterally, respiratory effort normal, nonlabored breathing   Cardiovascular: RRR, normal radial pulses  Gastrointestinal: Positive bowel sounds, soft, nontender, nondistended  Musculoskeletal: Postoperative shoulder changes noted, relatively normal musculature for age, no lower extremity edema, BMI 40, morbidly obese  Psychiatric: Appropriate affect, cooperative, conversational  Neurologic: No slurred speech or facial droop, follows commands  Skin: No rashes or jaundice, warm      Results Reviewed:  Results from last 7 days   Lab Units 22  0454 228 22  1051   WBC 10*3/mm3 11.52*  --  8.18   HEMOGLOBIN g/dL 9.6* 10.2* 11.0*   HEMATOCRIT % 28.6* 31.1* 34.6   PLATELETS 10*3/mm3 197  --  299   INR   --   --  1.05     Results from last 7 days   Lab Units 22  0454 22  1051   SODIUM mmol/L 140 138   POTASSIUM mmol/L 4.8 3.8   CHLORIDE mmol/L 104 102   CO2 mmol/L 18.3* 22.8   BUN mg/dL 21 20   CREATININE mg/dL 1.19* 1.10*   GLUCOSE mg/dL 152* 123*   CALCIUM mg/dL 8.6 9.5   ALT (SGPT) U/L  --  35*    AST (SGOT) U/L  --  29     Estimated Creatinine Clearance: 58.8 mL/min (A) (by C-G formula based on SCr of 1.19 mg/dL (H)).    Microbiology Results Abnormal     None          Imaging Results (Last 24 Hours)     Procedure Component Value Units Date/Time    XR Shoulder 2+ View Right [[id number]] Collected: 05/25/22 1701     Updated: 05/25/22 1704    Narrative:      PORTABLE VIEWS OF THE RIGHT SHOULDER     CLINICAL HISTORY: Postop arthroplasty     2 views of the right shoulder demonstrate a total shoulder arthroplasty  that appears in satisfactory position.     This report was finalized on 5/25/2022 5:01 PM by Dr. Gerardo Hernandez M.D.                 I have reviewed the medications:  Scheduled Meds:aspirin, 325 mg, Oral, Q12H  atorvastatin, 20 mg, Oral, Daily  insulin lispro, 0-9 Units, Subcutaneous, TID AC  ketorolac, 15 mg, Intravenous, Q6H  levothyroxine, 100 mcg, Oral, Q AM  linagliptin, 5 mg, Oral, Daily  metFORMIN, 500 mg, Oral, BID With Meals  metoprolol succinate XL, 50 mg, Oral, Daily  montelukast, 10 mg, Oral, Daily  pantoprazole, 40 mg, Oral, QAM  pregabalin, 150 mg, Oral, BID  sodium chloride, 3 mL, Intravenous, Q12H  venlafaxine XR, 75 mg, Oral, Daily  vitamin B-6, 50 mg, Oral, Daily  [START ON 5/27/2022] vitamin D, 50,000 Units, Oral, Weekly      Continuous Infusions:lactated ringers, 9 mL/hr, Last Rate: 9 mL/hr (05/25/22 1233)  lactated ringers, 100 mL/hr, Last Rate: Stopped (05/26/22 1006)      PRN Meds:.•  acetaminophen  •  acetaminophen  •  benzocaine  •  dextrose  •  dextrose  •  glucagon (human recombinant)  •  guaiFENesin  •  HYDROcodone-acetaminophen  •  HYDROcodone-acetaminophen  •  HYDROmorphone **AND** naloxone  •  melatonin  •  ondansetron **OR** ondansetron  •  sodium chloride  •  traMADol    Assessment & Plan   Assessment & Plan     Active Hospital Problems    Diagnosis  POA   • **H/O total shoulder replacement, right [Z96.611]  Not Applicable   • Stage 3a chronic kidney disease (HCC)  [N18.31]  Yes   • COPD (chronic obstructive pulmonary disease) (HCC) [J44.9]  Unknown   • HTN (hypertension) [I10]  Unknown   • Hypothyroidism [E03.9]  Unknown   • Type 2 diabetes mellitus, without long-term current use of insulin (HCC) [E11.9]  Unknown   • GERD without esophagitis [K21.9]  Unknown      Resolved Hospital Problems   No resolved problems to display.        Brief Hospital Course to date:  Екатерина Jansen is a 73 y.o. female with arthritis who is status post shoulder replacement.    Discussion/plan for today:  Postoperative hypotension has improved.  Patient is asymptomatic.  I would not recommend holding her beta-blocker unless blood pressure less than 100.  She appears to be doing well on this medication.    Glucose slightly elevated.  Restart Tradjenta.  Restart low-dose metformin.  Recommend monitoring glucose and using correction scale.    COPD stable: Add nebulizers as needed.    Stage III chronic kidney disease: Appears to be at baseline.  Stable.    From our standpoint patient is stable to discharge when possible.  She should resume home diabetic medications at discharge and continue beta-blocker at discharge.  Discussed the plan with the patient who is in agreement.  Hospital medicine services will sign off.  Please do not hesitate to call us with any questions.    CODE STATUS:   Code Status and Medical Interventions:   Ordered at: 05/25/22 2966     Level Of Support Discussed With:    Patient     Code Status (Patient has no pulse and is not breathing):    CPR (Attempt to Resuscitate)     Medical Interventions (Patient has pulse or is breathing):    Full Support       Marco A Pack MD  05/26/22     The patient is a 89y Male complaining of

## 2023-08-21 NOTE — H&P ADULT - PROBLEM SELECTOR PLAN 2
- BP = 89/51 in ED (was 72/42 at the time of LOC)  - appears to be due to dehydration (as related above)  - s/p one liter NaCl in the ED  - additional IVF of NaCl one liter over 10 hours prescribed  - evaluate need for additional IVF prescription  - encourage oral hydration  - f/u blood cultures, electrolytes

## 2023-08-21 NOTE — H&P ADULT - PROBLEM SELECTOR PLAN 3
- report of same, occurring over the week end and w/ witnessed syncope after diarrheal stool prior to coming to the ED  - unclear etiology, but could be affected by chemotherapy  - patient w/ mild tenderness to moderate palpation over the LLQ; possible diverticulitis, but patient states no abdominal pain prior, including no abdominal pain associated with bowel movement  - ensure optimal hydration  - f/u electrolytes; repleting calcium and phosphorus presently

## 2023-08-21 NOTE — ASSESSMENT
[FreeTextEntry1] : TRISTAN MCGRATH is a 89 year man with PMH of gout, HTN, BPH, and narcolepsy, who presents for Hematology follow up of IgG kappa multiple myeloma.  1. IgG kappa multiple myeloma: He was previously followed at [facility] for IgG kappa MGUS. He had uptrending paraprotein markers (M-spike 1.6, K/L 229, IgG 2057, UPEP with 260 mg monoclonal protein in 24 hours) in 2/2023, concerning for progression to multiple myeloma. He was admitted to the hospital for hypercalcemia (treated with IVF and calcitonin) and BORIS (Cr 1.71). Bone marrow biopsy on 3/27/23 revealed "extensive interstitial and diffuse bone marrow involvement" with 87% plasma cells, indicating progression. FISH panel was notable for gain (1q), monosomy(13), and gain of chromosomes 5, 9, 11, and 15 (consistent with hyperdiploidy).  - Staging: He has high-risk features, including gain(1q) and renal injury at diagnosis - Induction therapy: He was started on Helen-Vd (daratumumab SC, Velcade 1 mg/m2 SC, dexamethasone 20 mg) on 3/22/23 with Valtrex prophylaxis. Per his son, he was planned to receive 10 weekly treatments, followed by monthly dosing. He received approximately 6 treatments, last on 6/5/23. - Treatment plan: We transitioned to [facility] per the ILANA trial as his renal function has now normalized. He started with daratumumab biweekly given prior treatment. --- Daratumumab SC biweekly x 8 (missed C2D15), then monthly --- Revlimid 10 mg D1-21 --- Dexamethasone 10 mg  C1D1 6/26/23, C2D1 7/24/23. Today is C3D1 (fourth biweekly dose of daratumumab). He developed hypotension in the treatment room (SBP 72/42), so treatment was cancelled and he will be sent to the [facility] ED for further evaluation.  - Monitor MM labs (SPEP, JEREL, free light chains, immunoglobulins) monthly - Monitor CBC and CMP weekly with treatment  2. Bones: PET/CT on 3/21/23 was notable for a right sacral lytic lesion with extraosseous soft tissue mass (6.6 x 4.9 cm, SUV 5.5), a T9 compression deformity, subacute fracture of the left lateral 7th rib, and acute fracture of the left posterolateral 8th rib.  - Vitamin D: 30 (6/26/23) - Anti-resorptive therapy: Pending dental evaluation  3. Kidney:  - Creatinine: 1.04 (7/24/23) - UPEP/JEREL: positive for two Bence Goodwin protein kappa type (6/26/23) - Avoid nephrotoxic agents  4. Hematology: He has mild anemia, likely secondary to his disease and therapy. - CBC today: 5.47 > 12 < 151 - Monitor CBC weekly with treatment  5. VTE: No history of VTE.  - Continue aspirin 81 mg daily for prophylaxis with Revlimid treatment  6. Infections: No recent or recurrent infections. 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 - IgG level: 535 (7/24/23) - Continue valacyclovir 500 mg daily for antiviral prophylaxis

## 2023-08-22 DIAGNOSIS — E83.51 HYPOCALCEMIA: ICD-10-CM

## 2023-08-22 DIAGNOSIS — E86.0 DEHYDRATION: ICD-10-CM

## 2023-08-22 DIAGNOSIS — N17.9 ACUTE KIDNEY FAILURE, UNSPECIFIED: ICD-10-CM

## 2023-08-22 DIAGNOSIS — E83.39 OTHER DISORDERS OF PHOSPHORUS METABOLISM: ICD-10-CM

## 2023-08-22 DIAGNOSIS — Z02.9 ENCOUNTER FOR ADMINISTRATIVE EXAMINATIONS, UNSPECIFIED: ICD-10-CM

## 2023-08-22 DIAGNOSIS — Z29.9 ENCOUNTER FOR PROPHYLACTIC MEASURES, UNSPECIFIED: ICD-10-CM

## 2023-08-22 DIAGNOSIS — I95.9 HYPOTENSION, UNSPECIFIED: ICD-10-CM

## 2023-08-22 DIAGNOSIS — R55 SYNCOPE AND COLLAPSE: ICD-10-CM

## 2023-08-22 DIAGNOSIS — C90.00 MULTIPLE MYELOMA NOT HAVING ACHIEVED REMISSION: ICD-10-CM

## 2023-08-22 DIAGNOSIS — R19.7 DIARRHEA, UNSPECIFIED: ICD-10-CM

## 2023-08-22 LAB
24R-OH-CALCIDIOL SERPL-MCNC: 30.9 NG/ML — SIGNIFICANT CHANGE UP (ref 30–80)
A1C WITH ESTIMATED AVERAGE GLUCOSE RESULT: 5.4 % — SIGNIFICANT CHANGE UP (ref 4–5.6)
ANION GAP SERPL CALC-SCNC: 11 MMOL/L — SIGNIFICANT CHANGE UP (ref 7–14)
BASOPHILS # BLD AUTO: 0.02 K/UL — SIGNIFICANT CHANGE UP (ref 0–0.2)
BASOPHILS NFR BLD AUTO: 0.5 % — SIGNIFICANT CHANGE UP (ref 0–2)
BUN SERPL-MCNC: 17 MG/DL — SIGNIFICANT CHANGE UP (ref 7–23)
CALCIUM SERPL-MCNC: 7.1 MG/DL — LOW (ref 8.4–10.5)
CHLORIDE SERPL-SCNC: 108 MMOL/L — HIGH (ref 98–107)
CHOLEST SERPL-MCNC: 115 MG/DL — SIGNIFICANT CHANGE UP
CO2 SERPL-SCNC: 18 MMOL/L — LOW (ref 22–31)
CREAT SERPL-MCNC: 1.03 MG/DL — SIGNIFICANT CHANGE UP (ref 0.5–1.3)
EGFR: 69 ML/MIN/1.73M2 — SIGNIFICANT CHANGE UP
EOSINOPHIL # BLD AUTO: 0.55 K/UL — HIGH (ref 0–0.5)
EOSINOPHIL NFR BLD AUTO: 12.7 % — HIGH (ref 0–6)
ESTIMATED AVERAGE GLUCOSE: 108 — SIGNIFICANT CHANGE UP
FOLATE SERPL-MCNC: >20 NG/ML — HIGH (ref 3.1–17.5)
GLUCOSE SERPL-MCNC: 90 MG/DL — SIGNIFICANT CHANGE UP (ref 70–99)
HCT VFR BLD CALC: 31.1 % — LOW (ref 39–50)
HDLC SERPL-MCNC: 39 MG/DL — LOW
HGB BLD-MCNC: 10.4 G/DL — LOW (ref 13–17)
IANC: 2.61 K/UL — SIGNIFICANT CHANGE UP (ref 1.8–7.4)
IMM GRANULOCYTES NFR BLD AUTO: 0.7 % — SIGNIFICANT CHANGE UP (ref 0–0.9)
LACTATE SERPL-SCNC: 0.7 MMOL/L — SIGNIFICANT CHANGE UP (ref 0.5–2)
LIPID PNL WITH DIRECT LDL SERPL: 55 MG/DL — SIGNIFICANT CHANGE UP
LYMPHOCYTES # BLD AUTO: 0.4 K/UL — LOW (ref 1–3.3)
LYMPHOCYTES # BLD AUTO: 9.2 % — LOW (ref 13–44)
MAGNESIUM SERPL-MCNC: 2.3 MG/DL — SIGNIFICANT CHANGE UP (ref 1.6–2.6)
MCHC RBC-ENTMCNC: 31.9 PG — SIGNIFICANT CHANGE UP (ref 27–34)
MCHC RBC-ENTMCNC: 33.4 GM/DL — SIGNIFICANT CHANGE UP (ref 32–36)
MCV RBC AUTO: 95.4 FL — SIGNIFICANT CHANGE UP (ref 80–100)
MONOCYTES # BLD AUTO: 0.72 K/UL — SIGNIFICANT CHANGE UP (ref 0–0.9)
MONOCYTES NFR BLD AUTO: 16.6 % — HIGH (ref 2–14)
NEUTROPHILS # BLD AUTO: 2.61 K/UL — SIGNIFICANT CHANGE UP (ref 1.8–7.4)
NEUTROPHILS NFR BLD AUTO: 60.3 % — SIGNIFICANT CHANGE UP (ref 43–77)
NON HDL CHOLESTEROL: 76 MG/DL — SIGNIFICANT CHANGE UP
NRBC # BLD: 0 /100 WBCS — SIGNIFICANT CHANGE UP (ref 0–0)
NRBC # FLD: 0 K/UL — SIGNIFICANT CHANGE UP (ref 0–0)
PHOSPHATE SERPL-MCNC: 1.7 MG/DL — LOW (ref 2.5–4.5)
PLATELET # BLD AUTO: 134 K/UL — LOW (ref 150–400)
POTASSIUM SERPL-MCNC: 3.5 MMOL/L — SIGNIFICANT CHANGE UP (ref 3.5–5.3)
POTASSIUM SERPL-SCNC: 3.5 MMOL/L — SIGNIFICANT CHANGE UP (ref 3.5–5.3)
RBC # BLD: 3.26 M/UL — LOW (ref 4.2–5.8)
RBC # FLD: 13.9 % — SIGNIFICANT CHANGE UP (ref 10.3–14.5)
SODIUM SERPL-SCNC: 137 MMOL/L — SIGNIFICANT CHANGE UP (ref 135–145)
TRIGL SERPL-MCNC: 103 MG/DL — SIGNIFICANT CHANGE UP
TROPONIN T, HIGH SENSITIVITY RESULT: 27 NG/L — SIGNIFICANT CHANGE UP
TSH SERPL-MCNC: 1.22 UIU/ML — SIGNIFICANT CHANGE UP (ref 0.27–4.2)
VIT B12 SERPL-MCNC: 904 PG/ML — HIGH (ref 200–900)
WBC # BLD: 4.33 K/UL — SIGNIFICANT CHANGE UP (ref 3.8–10.5)
WBC # FLD AUTO: 4.33 K/UL — SIGNIFICANT CHANGE UP (ref 3.8–10.5)

## 2023-08-22 PROCEDURE — 99223 1ST HOSP IP/OBS HIGH 75: CPT

## 2023-08-22 PROCEDURE — 99223 1ST HOSP IP/OBS HIGH 75: CPT | Mod: GC

## 2023-08-22 RX ORDER — CHOLECALCIFEROL (VITAMIN D3) 125 MCG
2000 CAPSULE ORAL DAILY
Refills: 0 | Status: DISCONTINUED | OUTPATIENT
Start: 2023-08-22 | End: 2023-08-30

## 2023-08-22 RX ORDER — ACETAMINOPHEN 500 MG
650 TABLET ORAL EVERY 6 HOURS
Refills: 0 | Status: DISCONTINUED | OUTPATIENT
Start: 2023-08-22 | End: 2023-08-30

## 2023-08-22 RX ORDER — VALACYCLOVIR 500 MG/1
500 TABLET, FILM COATED ORAL
Refills: 0 | Status: DISCONTINUED | OUTPATIENT
Start: 2023-08-22 | End: 2023-08-30

## 2023-08-22 RX ORDER — TAMSULOSIN HYDROCHLORIDE 0.4 MG/1
0.4 CAPSULE ORAL AT BEDTIME
Refills: 0 | Status: DISCONTINUED | OUTPATIENT
Start: 2023-08-22 | End: 2023-08-30

## 2023-08-22 RX ORDER — CALCIUM GLUCONATE 100 MG/ML
1 VIAL (ML) INTRAVENOUS ONCE
Refills: 0 | Status: COMPLETED | OUTPATIENT
Start: 2023-08-22 | End: 2023-08-22

## 2023-08-22 RX ORDER — POTASSIUM PHOSPHATE, MONOBASIC POTASSIUM PHOSPHATE, DIBASIC 236; 224 MG/ML; MG/ML
30 INJECTION, SOLUTION INTRAVENOUS ONCE
Refills: 0 | Status: COMPLETED | OUTPATIENT
Start: 2023-08-22 | End: 2023-08-22

## 2023-08-22 RX ORDER — HEPARIN SODIUM 5000 [USP'U]/ML
5000 INJECTION INTRAVENOUS; SUBCUTANEOUS EVERY 12 HOURS
Refills: 0 | Status: DISCONTINUED | OUTPATIENT
Start: 2023-08-22 | End: 2023-08-30

## 2023-08-22 RX ORDER — ONDANSETRON 8 MG/1
8 TABLET, FILM COATED ORAL EVERY 8 HOURS
Refills: 0 | Status: DISCONTINUED | OUTPATIENT
Start: 2023-08-22 | End: 2023-08-30

## 2023-08-22 RX ORDER — POTASSIUM PHOSPHATE, MONOBASIC POTASSIUM PHOSPHATE, DIBASIC 236; 224 MG/ML; MG/ML
15 INJECTION, SOLUTION INTRAVENOUS ONCE
Refills: 0 | Status: DISCONTINUED | OUTPATIENT
Start: 2023-08-22 | End: 2023-08-22

## 2023-08-22 RX ORDER — ALLOPURINOL 300 MG
100 TABLET ORAL DAILY
Refills: 0 | Status: DISCONTINUED | OUTPATIENT
Start: 2023-08-22 | End: 2023-08-30

## 2023-08-22 RX ADMIN — VALACYCLOVIR 500 MILLIGRAM(S): 500 TABLET, FILM COATED ORAL at 22:30

## 2023-08-22 RX ADMIN — HEPARIN SODIUM 5000 UNIT(S): 5000 INJECTION INTRAVENOUS; SUBCUTANEOUS at 18:43

## 2023-08-22 RX ADMIN — TAMSULOSIN HYDROCHLORIDE 0.4 MILLIGRAM(S): 0.4 CAPSULE ORAL at 21:44

## 2023-08-22 RX ADMIN — Medication 1000 MILLIGRAM(S): at 10:56

## 2023-08-22 RX ADMIN — TAMSULOSIN HYDROCHLORIDE 0.4 MILLIGRAM(S): 0.4 CAPSULE ORAL at 06:45

## 2023-08-22 RX ADMIN — HEPARIN SODIUM 5000 UNIT(S): 5000 INJECTION INTRAVENOUS; SUBCUTANEOUS at 06:45

## 2023-08-22 RX ADMIN — Medication 1 TABLET(S): at 18:43

## 2023-08-22 RX ADMIN — Medication 100 GRAM(S): at 06:45

## 2023-08-22 RX ADMIN — Medication 2000 UNIT(S): at 21:44

## 2023-08-22 RX ADMIN — Medication 650 MILLIGRAM(S): at 18:41

## 2023-08-22 RX ADMIN — VALACYCLOVIR 500 MILLIGRAM(S): 500 TABLET, FILM COATED ORAL at 09:24

## 2023-08-22 RX ADMIN — Medication 100 MILLIGRAM(S): at 21:44

## 2023-08-22 RX ADMIN — SODIUM CHLORIDE 100 MILLILITER(S): 9 INJECTION INTRAMUSCULAR; INTRAVENOUS; SUBCUTANEOUS at 11:15

## 2023-08-22 RX ADMIN — POTASSIUM PHOSPHATE, MONOBASIC POTASSIUM PHOSPHATE, DIBASIC 83.33 MILLIMOLE(S): 236; 224 INJECTION, SOLUTION INTRAVENOUS at 09:24

## 2023-08-22 NOTE — ED ADULT NURSE REASSESSMENT NOTE - NS ED NURSE REASSESS COMMENT FT1
Break RN note- Patient resting quietly in bed, breathing even and nonlabored. No acute distress. Cardiac monitor in place- sinus rhythm. Safety maintained. Patient stable upon exiting the room.
Pt straight cathed using sterile technique, as per MD order. PCA Yuliya at bedside for chaperone. 300ml of urine collected; clear, herber color. Breathing even, unlabored. Safety maintained.
Pt with no fever tolerated small amount of breakfast with no co nausea , pt co intermittent  right chest discomfort at this time , ACP made aware no orders received pt is NSR on cardiac monitor with intermittent pvs.  Pts aide at bedside. awaiting floor bed.
RN made aware of by tele tech, pt presents with rhythm change on cardiac monitor, trigeminy. Pt remains asymptotic; denying chest pain, dizziness, sob. Breathing even, unlabored. MD Caballero aware. Safety maintained.
pt requesting to use urinal at this time. brief and linens changed. repositioned in stretcher. remains on continuos monitor. call bell within reach, side rails up. awaiting bed assignment
Pt remains a&ox3, denying chest pain, sob, n+v, headache, dizziness, fever, chills. Breathing even, unlabored. Safety maintained. Pending bed urine results and dispo.
pt received A&Ox3 c/o discomfort in stretcher. pt repositioned, comfort measures provided. respirations even and unlabored. remains on continuos monitor, NSR noted, with 1st degree block. no acute distress noted. denies SOB, c/p, N/V/D, fevers at this time. awaiting bed assignment. safety maintained, side rails up. care giver at bedside
pt A&Ox3 (deficit in location), offering no complaints at this time. remains on continuos monitor, NSR noted. respirations even and unlabored. awaiting bed assignment. safety maintained, side rails up
Pt remains a&ox3, denying chest pain, sob, n+v, headache, dizziness, fever, chills. Breathing even, unlabored. PERRLA. No neuro deficits present. Vitals stable. Pending dispo. Safety maintained.

## 2023-08-22 NOTE — CONSULT NOTE ADULT - SUBJECTIVE AND OBJECTIVE BOX
89 yo M with a PMH of IgG kappa multiple myeloma, HTN, HLD, BPH, ?narcolepsy, macular degeneration, hearing difficulty (bilateral), forgetfulness and gait difficulty (uses walker) who presented from [facility] due to syncope and hypotension. Patient is forgetful and has his aide at bedside. Per the caregiver, patient had lab-work performed at the cancer center yesterday, and while waiting to undergo chemotherapy, he indicated wanting to use the toilet. While on the commode, patient called for the caregiver who was standing outside the door, who went to assist the patient. He was noted to have had diarrhea. As patient stood up, he complained of not feeling well and sat down again on the commode, slumping over and becoming unresponsive at the same time. Caregiver caught the patient, thereby, preventing any trauma to the body. Per caregiver, patient lost consciousness for a few seconds and appeared gray in color, with coldness of the hands. Pulse oximetry was difficult to obtain, but blood pressure at the time = 72/42.  Patient complained of left sided head pain, which resolved after receiving Tylenol. Additional information, as told to care provider by an individual who saw the patient over the weekend, patient had diarrhea over the weekend. No report of fever, chills, nausea, or vomiting. Patient reports inadequate fluid intake, but eats well. He states that he does have difficulty with short term memory. Reports occasional palpitations. He has been extremely fatigued over the past few months.    In terms of his multiple myeloma, he was initially followed at [facility] for IgG kappa MGUS. Bone marrow biopsy on 3/27/23 revealed "extensive interstitial and diffuse bone marrow involvement" with 87% plasma cells, indicating progression.  PET/CT on 3/21/23 was notable for a right sacral lytic lesion with extraosseous soft tissue mass. Pt. was started on Helen-Vd (daratumumab SC, Velcade 1 mg/m2 SC, dexamethasone 20 mg), and transferred his care to [facility] (Dr. Janessa Mcintyre) on 6/21/23.  He has since been receiving daratumumab (with 10 mg dexmethasone) every 2 weeks for 8 doses, along with PO Revlimid 10 mg days 1-21 every 28 day cycle. He was scheduled for his 5th biweekly dose of daratumumab (C3D1) but was canceled yesterday 8/21/23 at the treatment room due to te above.    Allergies    No Known Allergies    Intolerances        MEDICATIONS  (STANDING):  allopurinol 100 milliGRAM(s) Oral daily  cholecalciferol 2000 Unit(s) Oral daily  heparin   Injectable 5000 Unit(s) SubCutaneous every 12 hours  multivitamin 1 Tablet(s) Oral daily  tamsulosin 0.4 milliGRAM(s) Oral at bedtime  valACYclovir 500 milliGRAM(s) Oral two times a day    MEDICATIONS  (PRN):  acetaminophen     Tablet .. 650 milliGRAM(s) Oral every 6 hours PRN Mild Pain (1 - 3), Moderate Pain (4 - 6)  ondansetron    Tablet 8 milliGRAM(s) Oral every 8 hours PRN for indigestion      PAST MEDICAL & SURGICAL HISTORY:  Multiple myeloma      Hypertension      Dyslipidemia      Narcolepsy      Forgetfulness      Uses walker      History of use of hearing aid in both ears      [place] (hard of hearing)      Degeneration macular      Vision impairment      Presence of permanent cardiac pacemaker          FAMILY HISTORY:  No known family history of malignant neoplasm        SOCIAL HISTORY: Reportedly former smoker. No significant alcohol or drug use    REVIEW OF SYSTEMS:  CONSTITUTIONAL: + fatigue. No fever  EYES/ENT: No visual changes; no throat pain  NECK: No pain or stiffness  RESPIRATORY: no SOB or cough  CARDIOVASCULAR: No chest pain or palpitations  GASTROINTESTINAL: + diarrhea. No abdominal pain. No N/V/C  GENITOURINARY: No dysuria or hematuria  NEUROLOGICAL: No numbness or focal weakness  SKIN: No itching, burning, rashes, or lesions  MSK: no joint pain  Allergy: no urticaria        T(F): 101.3 (08-22-23 @ 18:07), Max: 101.3 (08-22-23 @ 18:07)  HR: 73 (08-22-23 @ 18:07)  BP: 143/57 (08-22-23 @ 18:07)  RR: 16 (08-22-23 @ 18:07)  SpO2: 100% (08-22-23 @ 18:07)  Wt(kg): --    GENERAL: NAD  HEENT: EOMI, MMM, no oropharyngeal lesions or erythema appreciated  Pulm: no increased WOB, CTAB/L  CV: RRR, S1, S2, no m/g/r  ABDOMEN: soft, NT, ND, no masses felt, no HSM  MSK: nl ROM  EXTREMITIES: no appreciable edema in b/l LE  Neuro: A&Ox3, no focal deficits  SKIN: warm and dry, no visible rash                          10.4   4.33  )-----------( 134      ( 22 Aug 2023 06:54 )             31.1       08-22    137  |  108<H>  |  17  ----------------------------<  90  3.5   |  18<L>  |  1.03    Ca    7.1<L>      22 Aug 2023 06:54  Phos  1.7     08-22  Mg     2.30     08-22    TPro  5.4<L>  /  Alb  2.9<L>  /  TBili  0.6  /  DBili  x   /  AST  19  /  ALT  24  /  AlkPhos  93  08-21      Phosphorus: 1.7 mg/dL (08-22 @ 06:54)  Magnesium: 2.30 mg/dL (08-22 @ 06:54)  Magnesium: 2.00 mg/dL (08-21 @ 22:14)  Phosphorus: 1.9 mg/dL (08-21 @ 22:14)

## 2023-08-22 NOTE — PROGRESS NOTE ADULT - SUBJECTIVE AND OBJECTIVE BOX
**********************************************  [facility]  Danitza Gonzales MD  Available via MS Teams  Pager: [phone]  **********************************************     Patient is a 89y old  Male who presents with a chief complaint of Syncope (21 Aug 2023 21:55)    SUBJECTIVE / OVERNIGHT EVENTS: No acute events overnight. Patient examined at bedside this AM, with no subjective complaints. Denies CP, palpitations, SOB, n/v/d, abdominal pain.     OBJECTIVE:  Vital Signs Last 24 Hrs  T(C): 36.7 (22 Aug 2023 11:15), Max: 36.7 (22 Aug 2023 11:15)  T(F): 98.1 (22 Aug 2023 11:15), Max: 98.1 (22 Aug 2023 11:15)  HR: 63 (22 Aug 2023 11:15) (59 - 98)  BP: 140/67 (22 Aug 2023 11:15) (118/66 - 146/81)  BP(mean): --  RR: 16 (22 Aug 2023 11:15) (16 - 18)  SpO2: 96% (22 Aug 2023 11:15) (96% - 100%)    Parameters below as of 22 Aug 2023 11:15  Patient On (Oxygen Delivery Method): room air  O2 Flow (L/min): 2      I&O's Summary    Physical Exam:     General: No acute distress, well-appearing    Eyes: PERRL, EOMI     ENT: MMM, no oropharyngeal lesions or erythema appreciated     Pulm: No increased WOB. CTAB. No wheezing.     CV: RRR. S1&S2+. No M/R/G appreciated.     Abdomen: +BS. Soft, NTND. No organomegaly.     MSK: Nml ROM    Extremities: No peripheral edema or cyanosis.     Neuro: A&Ox1, no focal deficits     Skin: Warm and dry. No visible rash.       Labs:  CAPILLARY BLOOD GLUCOSE                              10.4   4.33  )-----------( 134      ( 22 Aug 2023 06:54 )             31.1     08-22    137  |  108<H>  |  17  ----------------------------<  90  3.5   |  18<L>  |  1.03    Ca    7.1<L>      22 Aug 2023 06:54  Phos  1.7     08-22  Mg     2.30     08-22    TPro  5.4<L>  /  Alb  2.9<L>  /  TBili  0.6  /  DBili  x   /  AST  19  /  ALT  24  /  AlkPhos  93  08-21            Urinalysis Basic - ( 22 Aug 2023 06:54 )    Color: x / Appearance: x / SG: x / pH: x  Gluc: 90 mg/dL / Ketone: x  / Bili: x / Urobili: x   Blood: x / Protein: x / Nitrite: x   Leuk Esterase: x / RBC: x / WBC x   Sq Epi: x / Non Sq Epi: x / Bacteria: x      Imaging Personally Reviewed:      MEDICATIONS  (STANDING):  allopurinol 100 milliGRAM(s) Oral daily  cholecalciferol 2000 Unit(s) Oral daily  heparin   Injectable 5000 Unit(s) SubCutaneous every 12 hours  multivitamin 1 Tablet(s) Oral daily  tamsulosin 0.4 milliGRAM(s) Oral at bedtime  valACYclovir 500 milliGRAM(s) Oral two times a day    MEDICATIONS  (PRN):  acetaminophen     Tablet .. 650 milliGRAM(s) Oral every 6 hours PRN Mild Pain (1 - 3), Moderate Pain (4 - 6)  ondansetron    Tablet 8 milliGRAM(s) Oral every 8 hours PRN for indigestion

## 2023-08-22 NOTE — ED ADULT NURSE REASSESSMENT NOTE - AS PAIN REST
2 (mild pain)
0 (no pain/absence of nonverbal indicators of pain)

## 2023-08-22 NOTE — PROGRESS NOTE ADULT - ASSESSMENT
89 year old male, with past history significant for Multiple myeloma, Hypertension, Dyslipidemia, Macular degeneration, Hearing difficulty (bilateral), and Gait difficulty (uses walker), presented to the ED secondary to loss of consciousness.  Diagnosed with Syncope in the ED.

## 2023-08-22 NOTE — ED ADULT NURSE REASSESSMENT NOTE - PAIN RATING/NUMBER SCALE (0-10): ACTIVITY
0 (no pain/absence of nonverbal indicators of pain)
2 (mild pain)
0 (no pain/absence of nonverbal indicators of pain)

## 2023-08-22 NOTE — CONSULT NOTE ADULT - ASSESSMENT
89 yo M with a PMH of IgG kappa multiple myeloma, HTN, HLD, BPH, ?narcolepsy, macular degeneration, hearing difficulty (bilateral), forgetfulness and gait difficulty (uses walker) who presented from [facility] due to syncope and hypotension at the treatment room. Hematology consulted for multiple myeloma management.    #Multiple Myeloma  - Initially followed at [facility] for IgG kappa MGUS  - PET scan 3/21/23 noted R sacral lytic lesion with extraosseous metastasis. BMBx 3/27/23 showed 87% plasma cells, meeting criteria for multiple myeloma  - Patient was started on Helen-Vd (daratumumab SC, Velcade 1 mg/m2 SC, dexamethasone 20 mg)  - Transferred his care to [facility] (Dr. Janessa Mcintyre) on 6/21/23  - Since with C1D1 on 6/26/23, he began Helen-Rd (daratumumab + dexamethasone every 2 weeks for 8 doses) with PO Revlimid (days 21 out of 28-day cycles)  - Was scheduled for (5th dose) C3D1 of helen/dex on 8/21/23 but transferred to [facility] for hypotension, syncope, and fatigue  - Suspect syncope was vasovagal, although diarrhea can well be a side effect of Revlimid  - Would check stool cx or GI PCR if patient is still having diarrhea  - Hold Revlimid inpatient but c/w acyclovir for PPX  - Blood cx and RVP negative  - Most recent labs from outpatient show stable K/L ratio of 1.66 and stable IgG level of 423. M-spike pending but was 0.3 on 7/24  - No plans for inpatient MM treatment. F/u with Dr. Mcintyre outpatient      Aryles Hedjar, MD, PGY-6  Hematology/Oncology Fellow  [facility]  Pager: [phone]  After 5PM and on weekends and holidays, please call the inpatient fellow on call.

## 2023-08-23 LAB
ALBUMIN SERPL ELPH-MCNC: 2.7 G/DL — LOW (ref 3.3–5)
ALP SERPL-CCNC: 100 U/L — SIGNIFICANT CHANGE UP (ref 40–120)
ALT FLD-CCNC: 24 U/L — SIGNIFICANT CHANGE UP (ref 4–41)
ANION GAP SERPL CALC-SCNC: 11 MMOL/L — SIGNIFICANT CHANGE UP (ref 7–14)
AST SERPL-CCNC: 21 U/L — SIGNIFICANT CHANGE UP (ref 4–40)
BILIRUB DIRECT SERPL-MCNC: 0.3 MG/DL — SIGNIFICANT CHANGE UP (ref 0–0.3)
BILIRUB INDIRECT FLD-MCNC: 0.5 MG/DL — SIGNIFICANT CHANGE UP (ref 0–1)
BILIRUB SERPL-MCNC: 0.8 MG/DL — SIGNIFICANT CHANGE UP (ref 0.2–1.2)
BUN SERPL-MCNC: 14 MG/DL — SIGNIFICANT CHANGE UP (ref 7–23)
CALCIUM SERPL-MCNC: 6.8 MG/DL — LOW (ref 8.4–10.5)
CHLORIDE SERPL-SCNC: 110 MMOL/L — HIGH (ref 98–107)
CO2 SERPL-SCNC: 16 MMOL/L — LOW (ref 22–31)
CREAT SERPL-MCNC: 0.96 MG/DL — SIGNIFICANT CHANGE UP (ref 0.5–1.3)
CULTURE RESULTS: NO GROWTH — SIGNIFICANT CHANGE UP
EGFR: 75 ML/MIN/1.73M2 — SIGNIFICANT CHANGE UP
GLUCOSE SERPL-MCNC: 102 MG/DL — HIGH (ref 70–99)
HCT VFR BLD CALC: 30.4 % — LOW (ref 39–50)
HGB BLD-MCNC: 10.2 G/DL — LOW (ref 13–17)
MAGNESIUM SERPL-MCNC: 1.8 MG/DL — SIGNIFICANT CHANGE UP (ref 1.6–2.6)
MCHC RBC-ENTMCNC: 32.4 PG — SIGNIFICANT CHANGE UP (ref 27–34)
MCHC RBC-ENTMCNC: 33.6 GM/DL — SIGNIFICANT CHANGE UP (ref 32–36)
MCV RBC AUTO: 96.5 FL — SIGNIFICANT CHANGE UP (ref 80–100)
NRBC # BLD: 0 /100 WBCS — SIGNIFICANT CHANGE UP (ref 0–0)
NRBC # FLD: 0 K/UL — SIGNIFICANT CHANGE UP (ref 0–0)
PHOSPHATE SERPL-MCNC: 2.2 MG/DL — LOW (ref 2.5–4.5)
PLATELET # BLD AUTO: 142 K/UL — LOW (ref 150–400)
POTASSIUM SERPL-MCNC: 3.3 MMOL/L — LOW (ref 3.5–5.3)
POTASSIUM SERPL-SCNC: 3.3 MMOL/L — LOW (ref 3.5–5.3)
PROT SERPL-MCNC: 5.3 G/DL — LOW (ref 6–8.3)
RBC # BLD: 3.15 M/UL — LOW (ref 4.2–5.8)
RBC # FLD: 13.9 % — SIGNIFICANT CHANGE UP (ref 10.3–14.5)
SODIUM SERPL-SCNC: 137 MMOL/L — SIGNIFICANT CHANGE UP (ref 135–145)
SPECIMEN SOURCE: SIGNIFICANT CHANGE UP
URATE SERPL-MCNC: 2.8 MG/DL — LOW (ref 3.4–8.8)
WBC # BLD: 3.93 K/UL — SIGNIFICANT CHANGE UP (ref 3.8–10.5)
WBC # FLD AUTO: 3.93 K/UL — SIGNIFICANT CHANGE UP (ref 3.8–10.5)

## 2023-08-23 PROCEDURE — 93306 TTE W/DOPPLER COMPLETE: CPT | Mod: 26

## 2023-08-23 PROCEDURE — 73130 X-RAY EXAM OF HAND: CPT | Mod: 26,RT

## 2023-08-23 PROCEDURE — 99232 SBSQ HOSP IP/OBS MODERATE 35: CPT

## 2023-08-23 RX ORDER — IBUPROFEN 200 MG
400 TABLET ORAL EVERY 8 HOURS
Refills: 0 | Status: COMPLETED | OUTPATIENT
Start: 2023-08-23 | End: 2023-08-24

## 2023-08-23 RX ORDER — SENNA PLUS 8.6 MG/1
2 TABLET ORAL AT BEDTIME
Refills: 0 | Status: DISCONTINUED | OUTPATIENT
Start: 2023-08-23 | End: 2023-08-30

## 2023-08-23 RX ORDER — TRAMADOL HYDROCHLORIDE 50 MG/1
25 TABLET ORAL EVERY 8 HOURS
Refills: 0 | Status: DISCONTINUED | OUTPATIENT
Start: 2023-08-23 | End: 2023-08-30

## 2023-08-23 RX ORDER — CALCIUM GLUCONATE 100 MG/ML
2 VIAL (ML) INTRAVENOUS ONCE
Refills: 0 | Status: COMPLETED | OUTPATIENT
Start: 2023-08-23 | End: 2023-08-23

## 2023-08-23 RX ORDER — SODIUM,POTASSIUM PHOSPHATES 278-250MG
1 POWDER IN PACKET (EA) ORAL
Refills: 0 | Status: COMPLETED | OUTPATIENT
Start: 2023-08-23 | End: 2023-08-23

## 2023-08-23 RX ORDER — POLYETHYLENE GLYCOL 3350 17 G/17G
17 POWDER, FOR SOLUTION ORAL DAILY
Refills: 0 | Status: DISCONTINUED | OUTPATIENT
Start: 2023-08-23 | End: 2023-08-30

## 2023-08-23 RX ORDER — POTASSIUM CHLORIDE 20 MEQ
40 PACKET (EA) ORAL ONCE
Refills: 0 | Status: COMPLETED | OUTPATIENT
Start: 2023-08-23 | End: 2023-08-23

## 2023-08-23 RX ADMIN — Medication 650 MILLIGRAM(S): at 17:59

## 2023-08-23 RX ADMIN — Medication 1 TABLET(S): at 17:59

## 2023-08-23 RX ADMIN — HEPARIN SODIUM 5000 UNIT(S): 5000 INJECTION INTRAVENOUS; SUBCUTANEOUS at 07:31

## 2023-08-23 RX ADMIN — SENNA PLUS 2 TABLET(S): 8.6 TABLET ORAL at 21:57

## 2023-08-23 RX ADMIN — VALACYCLOVIR 500 MILLIGRAM(S): 500 TABLET, FILM COATED ORAL at 17:59

## 2023-08-23 RX ADMIN — Medication 1 TABLET(S): at 10:55

## 2023-08-23 RX ADMIN — Medication 650 MILLIGRAM(S): at 02:55

## 2023-08-23 RX ADMIN — Medication 400 MILLIGRAM(S): at 21:57

## 2023-08-23 RX ADMIN — Medication 400 MILLIGRAM(S): at 22:28

## 2023-08-23 RX ADMIN — HEPARIN SODIUM 5000 UNIT(S): 5000 INJECTION INTRAVENOUS; SUBCUTANEOUS at 18:00

## 2023-08-23 RX ADMIN — Medication 100 MILLIGRAM(S): at 10:55

## 2023-08-23 RX ADMIN — Medication 650 MILLIGRAM(S): at 03:25

## 2023-08-23 RX ADMIN — Medication 100 GRAM(S): at 14:29

## 2023-08-23 RX ADMIN — Medication 650 MILLIGRAM(S): at 18:29

## 2023-08-23 RX ADMIN — VALACYCLOVIR 500 MILLIGRAM(S): 500 TABLET, FILM COATED ORAL at 10:56

## 2023-08-23 RX ADMIN — Medication 2000 UNIT(S): at 14:29

## 2023-08-23 RX ADMIN — Medication 40 MILLIEQUIVALENT(S): at 10:55

## 2023-08-23 RX ADMIN — Medication 650 MILLIGRAM(S): at 10:55

## 2023-08-23 RX ADMIN — Medication 650 MILLIGRAM(S): at 11:13

## 2023-08-23 RX ADMIN — TAMSULOSIN HYDROCHLORIDE 0.4 MILLIGRAM(S): 0.4 CAPSULE ORAL at 21:57

## 2023-08-23 NOTE — PHYSICAL THERAPY INITIAL EVALUATION ADULT - GENERAL OBSERVATIONS, REHAB EVAL
Pt received semi supine in bed, +tele , +IV , +[place] ,+O2 , +swelling of R wrist/hand, pt with c/o pain on R wrist to touch/movements- BELINDA Larson made aware. /83 74 100% in supine

## 2023-08-23 NOTE — PHYSICAL THERAPY INITIAL EVALUATION ADULT - PERTINENT HX OF CURRENT PROBLEM, REHAB EVAL
This is a 89 year old male, with past history significant for Multiple myeloma, Hypertension, and Gait difficulty (uses walker), presented to the ED secondary to loss of consciousness.  Diagnosed with Syncope in the ED.

## 2023-08-23 NOTE — PROGRESS NOTE ADULT - SUBJECTIVE AND OBJECTIVE BOX
**********************************************  [facility] of [facility] Medicine  Danitza Gonzales MD  Available via MS Teams  Pager: [phone]  **********************************************     Patient is a 89y old  Male who presents with a chief complaint of Syncope (23 Aug 2023 14:27)    SUBJECTIVE / OVERNIGHT EVENTS: No acute events overnight. Patient examined at bedside this AM with aide from NH at bedside. Reports R wrist pain and swelling. Denies CP, palpitations, SOB, n/v/d, abdominal pain.     OBJECTIVE:  Vital Signs Last 24 Hrs  T(C): 36.8 (23 Aug 2023 09:26), Max: 38.5 (22 Aug 2023 18:07)  T(F): 98.2 (23 Aug 2023 09:26), Max: 101.3 (22 Aug 2023 18:07)  HR: 72 (23 Aug 2023 09:26) (60 - 73)  BP: 118/80 (23 Aug 2023 09:26) (113/50 - 149/60)  BP(mean): --  RR: 18 (23 Aug 2023 09:26) (16 - 18)  SpO2: 99% (23 Aug 2023 09:26) (97% - 100%)    Parameters below as of 23 Aug 2023 09:26  Patient On (Oxygen Delivery Method): nasal cannula      Physical Exam:     General: No acute distress, well-appearing    Eyes: PERRL, EOMI     ENT: MMM, no oropharyngeal lesions or erythema appreciated     Pulm: No increased WOB. CTAB. No wheezing.     CV: RRR. S1&S2+. No M/R/G appreciated.     Abdomen: +BS. Soft, NTND. No organomegaly.     MSK: Nml ROM    Extremities: No peripheral edema or cyanosis. R hand with swelling/erythema. warm and tender to touch. IV proximal wrist.     Neuro: A&Ox3, no focal deficits     Skin: Warm and dry. No visible rash.       Labs:  CAPILLARY BLOOD GLUCOSE                              10.2   3.93  )-----------( 142      ( 23 Aug 2023 05:40 )             30.4     08-23    137  |  110<H>  |  14  ----------------------------<  102<H>  3.3<L>   |  16<L>  |  0.96    Ca    6.8<L>      23 Aug 2023 05:40  Phos  2.2     08-23  Mg     1.80     08-23    TPro  5.3<L>  /  Alb  2.7<L>  /  TBili  0.8  /  DBili  0.3  /  AST  21  /  ALT  24  /  AlkPhos  100  08-23            Culture - Urine (collected 21 Aug 2023 20:21)  Source: Clean Catch Clean Catch (Midstream)  Final Report (23 Aug 2023 15:19):    No growth    Culture - Blood (collected 21 Aug 2023 14:50)  Source: .Blood Blood-Peripheral  Preliminary Report (22 Aug 2023 19:01):    No growth at 24 hours    Culture - Blood (collected 21 Aug 2023 14:42)  Source: .Blood Blood-Peripheral  Preliminary Report (22 Aug 2023 19:01):    No growth at 24 hours      Urinalysis Basic - ( 23 Aug 2023 05:40 )    Color: x / Appearance: x / SG: x / pH: x  Gluc: 102 mg/dL / Ketone: x  / Bili: x / Urobili: x   Blood: x / Protein: x / Nitrite: x   Leuk Esterase: x / RBC: x / WBC x   Sq Epi: x / Non Sq Epi: x / Bacteria: x      Imaging Personally Reviewed:      MEDICATIONS  (STANDING):  allopurinol 100 milliGRAM(s) Oral daily  cholecalciferol 2000 Unit(s) Oral daily  heparin   Injectable 5000 Unit(s) SubCutaneous every 12 hours  ibuprofen  Tablet. 400 milliGRAM(s) Oral every 8 hours  multivitamin 1 Tablet(s) Oral daily  polyethylene glycol 3350 17 Gram(s) Oral daily  potassium phosphate / sodium phosphate Tablet (K-PHOS No. 2) 1 Tablet(s) Oral three times a day with meals  senna 2 Tablet(s) Oral at bedtime  tamsulosin 0.4 milliGRAM(s) Oral at bedtime  valACYclovir 500 milliGRAM(s) Oral two times a day    MEDICATIONS  (PRN):  acetaminophen     Tablet .. 650 milliGRAM(s) Oral every 6 hours PRN Mild Pain (1 - 3), Moderate Pain (4 - 6)  ondansetron    Tablet 8 milliGRAM(s) Oral every 8 hours PRN for indigestion  traMADol 25 milliGRAM(s) Oral every 8 hours PRN moderate to severe pain

## 2023-08-23 NOTE — PHYSICAL THERAPY INITIAL EVALUATION ADULT - ACTIVE RANGE OF MOTION EXAMINATION, REHAB EVAL
not tested on RUE due to pt with c/o pain and is pending X-ray R hand/Left UE Active ROM was WFL (within functional limits)/bilateral  lower extremity Active ROM was WFL (within functional limits)

## 2023-08-24 ENCOUNTER — TRANSCRIPTION ENCOUNTER (OUTPATIENT)
Age: 88
End: 2023-08-24

## 2023-08-24 ENCOUNTER — APPOINTMENT (OUTPATIENT)
Dept: PSYCHIATRY | Facility: CLINIC | Age: 88
End: 2023-08-24

## 2023-08-24 DIAGNOSIS — J96.01 ACUTE RESPIRATORY FAILURE WITH HYPOXIA: ICD-10-CM

## 2023-08-24 DIAGNOSIS — M79.89 OTHER SPECIFIED SOFT TISSUE DISORDERS: ICD-10-CM

## 2023-08-24 LAB
ALBUMIN MFR SERPL ELPH: 54.7 %
ALBUMIN SERPL ELPH-MCNC: 2.4 G/DL — LOW (ref 3.3–5)
ALBUMIN SERPL-MCNC: 3.1 G/DL
ALBUMIN/GLOB SERPL: 1.2 RATIO
ALP SERPL-CCNC: 102 U/L — SIGNIFICANT CHANGE UP (ref 40–120)
ALPHA1 GLOB MFR SERPL ELPH: 10.1 %
ALPHA1 GLOB SERPL ELPH-MCNC: 0.6 G/DL
ALPHA2 GLOB MFR SERPL ELPH: 16.5 %
ALPHA2 GLOB SERPL ELPH-MCNC: 0.9 G/DL
ALT FLD-CCNC: 19 U/L — SIGNIFICANT CHANGE UP (ref 4–41)
ANION GAP SERPL CALC-SCNC: 12 MMOL/L — SIGNIFICANT CHANGE UP (ref 7–14)
AST SERPL-CCNC: 17 U/L — SIGNIFICANT CHANGE UP (ref 4–40)
B-GLOBULIN MFR SERPL ELPH: 13.4 %
B-GLOBULIN SERPL ELPH-MCNC: 0.8 G/DL
BILIRUB DIRECT SERPL-MCNC: 0.2 MG/DL — SIGNIFICANT CHANGE UP (ref 0–0.3)
BILIRUB INDIRECT FLD-MCNC: 0.4 MG/DL — SIGNIFICANT CHANGE UP (ref 0–1)
BILIRUB SERPL-MCNC: 0.6 MG/DL — SIGNIFICANT CHANGE UP (ref 0.2–1.2)
BUN SERPL-MCNC: 16 MG/DL — SIGNIFICANT CHANGE UP (ref 7–23)
CALCIUM SERPL-MCNC: 7 MG/DL — LOW (ref 8.4–10.5)
CHLORIDE SERPL-SCNC: 109 MMOL/L — HIGH (ref 98–107)
CO2 SERPL-SCNC: 16 MMOL/L — LOW (ref 22–31)
CREAT SERPL-MCNC: 1.01 MG/DL — SIGNIFICANT CHANGE UP (ref 0.5–1.3)
DEPRECATED KAPPA LC FREE/LAMBDA SER: 1.66 RATIO
EGFR: 70 ML/MIN/1.73M2 — SIGNIFICANT CHANGE UP
GAMMA GLOB FLD ELPH-MCNC: 0.3 G/DL
GAMMA GLOB MFR SERPL ELPH: 5.3 %
GLUCOSE SERPL-MCNC: 85 MG/DL — SIGNIFICANT CHANGE UP (ref 70–99)
HCT VFR BLD CALC: 30.9 % — LOW (ref 39–50)
HGB BLD-MCNC: 10.3 G/DL — LOW (ref 13–17)
IGA SER QL IEP: 44 MG/DL
IGG SER QL IEP: 423 MG/DL
IGM SER QL IEP: 17 MG/DL
INTERPRETATION SERPL IEP-IMP: NORMAL
KAPPA LC CSF-MCNC: 1.43 MG/DL
KAPPA LC SERPL-MCNC: 2.38 MG/DL
M PROTEIN MFR SERPL ELPH: NORMAL
M PROTEIN SPEC IFE-MCNC: NORMAL
MAGNESIUM SERPL-MCNC: 1.8 MG/DL — SIGNIFICANT CHANGE UP (ref 1.6–2.6)
MCHC RBC-ENTMCNC: 31.9 PG — SIGNIFICANT CHANGE UP (ref 27–34)
MCHC RBC-ENTMCNC: 33.3 GM/DL — SIGNIFICANT CHANGE UP (ref 32–36)
MCV RBC AUTO: 95.7 FL — SIGNIFICANT CHANGE UP (ref 80–100)
MONOCLON BAND OBS SERPL: NORMAL
NRBC # BLD: 0 /100 WBCS — SIGNIFICANT CHANGE UP (ref 0–0)
NRBC # FLD: 0 K/UL — SIGNIFICANT CHANGE UP (ref 0–0)
PHOSPHATE SERPL-MCNC: 2.1 MG/DL — LOW (ref 2.5–4.5)
PLATELET # BLD AUTO: 148 K/UL — LOW (ref 150–400)
POTASSIUM SERPL-MCNC: 3.4 MMOL/L — LOW (ref 3.5–5.3)
POTASSIUM SERPL-SCNC: 3.4 MMOL/L — LOW (ref 3.5–5.3)
PROT SERPL-MCNC: 5.1 G/DL — LOW (ref 6–8.3)
PROT SERPL-MCNC: 5.7 G/DL
PROT SERPL-MCNC: 5.7 G/DL
RBC # BLD: 3.23 M/UL — LOW (ref 4.2–5.8)
RBC # FLD: 14.1 % — SIGNIFICANT CHANGE UP (ref 10.3–14.5)
SODIUM SERPL-SCNC: 137 MMOL/L — SIGNIFICANT CHANGE UP (ref 135–145)
WBC # BLD: 4.19 K/UL — SIGNIFICANT CHANGE UP (ref 3.8–10.5)
WBC # FLD AUTO: 4.19 K/UL — SIGNIFICANT CHANGE UP (ref 3.8–10.5)

## 2023-08-24 PROCEDURE — 99232 SBSQ HOSP IP/OBS MODERATE 35: CPT

## 2023-08-24 RX ORDER — POTASSIUM PHOSPHATE, MONOBASIC POTASSIUM PHOSPHATE, DIBASIC 236; 224 MG/ML; MG/ML
30 INJECTION, SOLUTION INTRAVENOUS ONCE
Refills: 0 | Status: COMPLETED | OUTPATIENT
Start: 2023-08-24 | End: 2023-08-24

## 2023-08-24 RX ORDER — CALCIUM GLUCONATE 100 MG/ML
2 VIAL (ML) INTRAVENOUS ONCE
Refills: 0 | Status: COMPLETED | OUTPATIENT
Start: 2023-08-24 | End: 2023-08-24

## 2023-08-24 RX ADMIN — VALACYCLOVIR 500 MILLIGRAM(S): 500 TABLET, FILM COATED ORAL at 06:23

## 2023-08-24 RX ADMIN — Medication 400 MILLIGRAM(S): at 06:53

## 2023-08-24 RX ADMIN — Medication 1 TABLET(S): at 11:52

## 2023-08-24 RX ADMIN — Medication 100 MILLIGRAM(S): at 11:51

## 2023-08-24 RX ADMIN — HEPARIN SODIUM 5000 UNIT(S): 5000 INJECTION INTRAVENOUS; SUBCUTANEOUS at 06:23

## 2023-08-24 RX ADMIN — POTASSIUM PHOSPHATE, MONOBASIC POTASSIUM PHOSPHATE, DIBASIC 83.33 MILLIMOLE(S): 236; 224 INJECTION, SOLUTION INTRAVENOUS at 14:27

## 2023-08-24 RX ADMIN — TAMSULOSIN HYDROCHLORIDE 0.4 MILLIGRAM(S): 0.4 CAPSULE ORAL at 21:46

## 2023-08-24 RX ADMIN — POLYETHYLENE GLYCOL 3350 17 GRAM(S): 17 POWDER, FOR SOLUTION ORAL at 11:51

## 2023-08-24 RX ADMIN — VALACYCLOVIR 500 MILLIGRAM(S): 500 TABLET, FILM COATED ORAL at 17:29

## 2023-08-24 RX ADMIN — Medication 2000 UNIT(S): at 11:52

## 2023-08-24 RX ADMIN — Medication 200 GRAM(S): at 11:51

## 2023-08-24 RX ADMIN — SENNA PLUS 2 TABLET(S): 8.6 TABLET ORAL at 21:46

## 2023-08-24 RX ADMIN — Medication 400 MILLIGRAM(S): at 06:23

## 2023-08-24 RX ADMIN — HEPARIN SODIUM 5000 UNIT(S): 5000 INJECTION INTRAVENOUS; SUBCUTANEOUS at 17:29

## 2023-08-24 NOTE — DISCHARGE NOTE PROVIDER - CARE PROVIDER_API CALL
Janessa Mcintyre  Hematology/Oncology  [address] [place] [id number]  Phone: [phone]  Fax: [phone]  Follow Up Time: 1 week

## 2023-08-24 NOTE — DISCHARGE NOTE PROVIDER - HOSPITAL COURSE
89M [place] with PMH of MGUS recently progressed to IgA kappa MM (on chemotherapy), HTN, HLD, bradycardia s/p PPM, BPH, gout, narcolepsy macular degeneration, gait difficulty presenting from oncologist's office after witnessed syncopal episode. Per patient's aide, he has been having diarrhea for a few days. He went to his oncologist's office for a chemotherapy session and had to go to the restroom. After having a loose BM, he syncopized for a few seconds on the toilet. His aide was able to catch him, denies head trauma. He regained consciousness and was noted to have a low BP (SBP 90's). BIBEMS to ED for further workup. Patient was admitted for syncopal workup, BORIS, and electrolyte abnormalities. His diarrhea self-resolved and his lethargy/BORIS improved with IV fluids. He was noted to be hypocalcemic and hypophosphatemic persistently. PTH was elevated to 400's, Vit D low-normal. He was continued on Vitamin D supplementation with plans for outpatient follow up. Additionally, he had an episode of R hand pain/swelling. Imaging with inflammatory arthritis, patient treated with ibuprofen and cold packs for possible gout flare. Symptoms improved prior to discharge.    89M [place] with PMH of MGUS recently progressed to IgA kappa MM (on chemotherapy), HTN, HLD, bradycardia s/p PPM, BPH, gout, narcolepsy macular degeneration, gait difficulty presenting from oncologist's office after witnessed syncopal episode. Per patient's aide, he has been having diarrhea for a few days. He went to his oncologist's office for a chemotherapy session and had to go to the restroom. After having a loose BM, he syncopized for a few seconds on the toilet. His aide was able to catch him, denies head trauma. He regained consciousness and was noted to have a low BP (SBP 90's). BIBEMS to ED for further workup. Patient was admitted for syncopal workup, BORIS, and electrolyte abnormalities. His diarrhea self-resolved and his lethargy/BORIS improved with IV fluids. He was noted to be hypocalcemic and hypophosphatemic persistently. PTH was elevated to 400's, Vit D low-normal. He was continued on Vitamin D supplementation with plans for outpatient follow up. Additionally, he had an episode of R hand pain/swelling. Imaging with inflammatory arthritis. He was seen by rheumatology and received IV steroids. Symptoms improved prior to discharge.    89M [place] with PMH of MGUS recently progressed to IgA kappa MM (on chemotherapy), HTN, HLD, bradycardia s/p PPM, BPH, gout, narcolepsy macular degeneration, gait difficulty presenting from oncologist's office after witnessed syncopal episode. Per patient's aide, he has been having diarrhea for a few days. He went to his oncologist's office for a chemotherapy session and had to go to the restroom. After having a loose BM, he syncopized for a few seconds on the toilet. His aide was able to catch him, denies head trauma. He regained consciousness and was noted to have a low BP (SBP 90's). BIBEMS to ED for further workup. Patient was admitted for syncopal workup, BORIS, and electrolyte abnormalities. His diarrhea self-resolved and his lethargy /BORIS improved with IV fluids. He was noted to be hypocalcemic and hypophosphatemic persistently. PTH was elevated to 400's, Vit D low-normal. He was continued on Vitamin D supplementation with plans for outpatient follow up. Additionally, he had an episode of R hand pain/swelling. Imaging with inflammatory arthritis. He was seen by rheumatology and received IV steroids. Symptoms improved prior to discharge.

## 2023-08-24 NOTE — DIETITIAN INITIAL EVALUATION ADULT - PERTINENT LABORATORY DATA
08-24    137  |  109<H>  |  16  ----------------------------<  85  3.4<L>   |  16<L>  |  1.01    Ca    7.0<L>      24 Aug 2023 05:20  Phos  2.1     08-24  Mg     1.80     08-24    TPro  5.1<L>  /  Alb  2.4<L>  /  TBili  0.6  /  DBili  0.2  /  AST  17  /  ALT  19  /  AlkPhos  102  08-24  A1C with Estimated Average Glucose Result: 5.4 % (08-22-23 @ 06:54)

## 2023-08-24 NOTE — DISCHARGE NOTE PROVIDER - NSDCMRMEDTOKEN_GEN_ALL_CORE_FT
acetaminophen 500 mg oral tablet: 2 orally every 8 hours as needed for  moderate pain  allopurinol 300 mg oral tablet: 1 orally once a day  centrum silver:   docusate sodium 100 mg oral tablet: 1 orally once a day (at bedtime)  Flomax 0.4 mg oral capsule: 1 orally  lenalidomide 10 mg oral capsule: 1 tab(s) orally once a day  ondansetron 8 mg oral tablet: 1 orally every 8 hours as needed for  indigestion  polyethylene glycol 3350 oral powder for reconstitution: 17 orally once a day (at bedtime) as needed for  constipation  valACYclovir 500 mg oral tablet: 1 tab(s) orally 2 times a day  valsartan 160 mg oral tablet: 1 orally once a day  Vitamin D 2000u:    acetaminophen 325 mg oral tablet: 2 tab(s) orally every 6 hours As needed Mild Pain (1 - 3), Moderate Pain (4 - 6)  allopurinol 300 mg oral tablet: 1 tab(s) orally once a day  cholecalciferol oral tablet: 2000 unit(s) orally once a day  docusate sodium 100 mg oral tablet: 1 orally once a day (at bedtime)  Flomax 0.4 mg oral capsule: 1 orally  Multiple Vitamins oral tablet: 1 tab(s) orally once a day  ondansetron 8 mg oral tablet: 1 orally every 8 hours as needed for  indigestion  polyethylene glycol 3350 oral powder for reconstitution: 17 orally once a day (at bedtime) as needed for  constipation  predniSONE 5 mg oral tablet: 1 tab(s) orally once a day Please follow taper for prednisone: 20mg taper x3 days then 10mg x3 days then 5mg x3 days (start day 8/29)  valACYclovir 500 mg oral tablet: 1 tab(s) orally 2 times a day  valsartan 160 mg oral tablet: 1 orally once a day

## 2023-08-24 NOTE — DIETITIAN INITIAL EVALUATION ADULT - NSICDXPASTMEDICALHX_GEN_ALL_CORE_FT
PAST MEDICAL HISTORY:  Degeneration macular     Dyslipidemia     Forgetfulness     History of use of hearing aid in both ears     [place] (hard of hearing)     Hypertension     Multiple myeloma     Narcolepsy     Uses walker     Vision impairment

## 2023-08-24 NOTE — DIETITIAN INITIAL EVALUATION ADULT - PERTINENT MEDS FT
MEDICATIONS  (STANDING):  allopurinol 100 milliGRAM(s) Oral daily  cholecalciferol 2000 Unit(s) Oral daily  heparin   Injectable 5000 Unit(s) SubCutaneous every 12 hours  multivitamin 1 Tablet(s) Oral daily  polyethylene glycol 3350 17 Gram(s) Oral daily  senna 2 Tablet(s) Oral at bedtime  tamsulosin 0.4 milliGRAM(s) Oral at bedtime  valACYclovir 500 milliGRAM(s) Oral two times a day    MEDICATIONS  (PRN):  acetaminophen     Tablet .. 650 milliGRAM(s) Oral every 6 hours PRN Mild Pain (1 - 3), Moderate Pain (4 - 6)  ondansetron    Tablet 8 milliGRAM(s) Oral every 8 hours PRN for indigestion  traMADol 25 milliGRAM(s) Oral every 8 hours PRN moderate to severe pain

## 2023-08-24 NOTE — DIETITIAN INITIAL EVALUATION ADULT - PROBLEM SELECTOR PLAN 8
- patient resides at [facility]  - has HHA during the days, but none at nights and weekends  - in the context of syncope, need to evaluate discharge needs  - Social Work consult in the AM (ordered); please f/u

## 2023-08-24 NOTE — PROGRESS NOTE ADULT - SUBJECTIVE AND OBJECTIVE BOX
**********************************************  [facility] Division of Hospital Medicine  Danitza Gonzales MD  Available via MS Teams  Pager: [phone]  **********************************************     Patient is a 89y old  Male who presents with a chief complaint of Syncope and collapse     (24 Aug 2023 14:36)    SUBJECTIVE / OVERNIGHT EVENTS: No acute events overnight. Patient examined at bedside this AM, reports improvement in pain/swelling in R hand but still present. Otherwise asking why he's on oxygen as he does not require it at home. Denies CP, palpitations, SOB, n/v/d, abdominal pain.     OBJECTIVE:  Vital Signs Last 24 Hrs  T(C): 36.4 (24 Aug 2023 09:45), Max: 36.6 (23 Aug 2023 21:50)  T(F): 97.6 (24 Aug 2023 09:45), Max: 97.8 (23 Aug 2023 21:50)  HR: 59 (24 Aug 2023 09:45) (59 - 78)  BP: 125/62 (24 Aug 2023 09:45) (122/66 - 143/61)  BP(mean): --  RR: 18 (24 Aug 2023 09:45) (16 - 18)  SpO2: 100% (24 Aug 2023 09:45) (100% - 100%)    Parameters below as of 24 Aug 2023 09:45  Patient On (Oxygen Delivery Method): nasal cannula        I&O's Summary    Physical Exam:     General: No acute distress, well-appearing    Eyes: PERRL, EOMI     ENT: MMM, no oropharyngeal lesions or erythema appreciated     Pulm: No increased WOB. CTAB. No wheezing.     CV: RRR. S1&S2+. No M/R/G appreciated.     Abdomen: +BS. Soft, NTND. No organomegaly.     MSK: Nml ROM    Extremities: No peripheral edema or cyanosis. R hand swelling/erythema, improving     Neuro: A&Ox3, a little forgetful. no focal deficits     Skin: Warm and dry. No visible rash.       Labs:  CAPILLARY BLOOD GLUCOSE                              10.3   4.19  )-----------( 148      ( 24 Aug 2023 05:20 )             30.9     08-24    137  |  109<H>  |  16  ----------------------------<  85  3.4<L>   |  16<L>  |  1.01    Ca    7.0<L>      24 Aug 2023 05:20  Phos  2.1     08-24  Mg     1.80     08-24    TPro  5.1<L>  /  Alb  2.4<L>  /  TBili  0.6  /  DBili  0.2  /  AST  17  /  ALT  19  /  AlkPhos  102  08-24            Culture - Urine (collected 21 Aug 2023 20:21)  Source: Clean Catch Clean Catch (Midstream)  Final Report (23 Aug 2023 15:19):    No growth      Urinalysis Basic - ( 24 Aug 2023 05:20 )    Color: x / Appearance: x / SG: x / pH: x  Gluc: 85 mg/dL / Ketone: x  / Bili: x / Urobili: x   Blood: x / Protein: x / Nitrite: x   Leuk Esterase: x / RBC: x / WBC x   Sq Epi: x / Non Sq Epi: x / Bacteria: x      Imaging Personally Reviewed:      MEDICATIONS  (STANDING):  allopurinol 100 milliGRAM(s) Oral daily  cholecalciferol 2000 Unit(s) Oral daily  heparin   Injectable 5000 Unit(s) SubCutaneous every 12 hours  multivitamin 1 Tablet(s) Oral daily  polyethylene glycol 3350 17 Gram(s) Oral daily  senna 2 Tablet(s) Oral at bedtime  tamsulosin 0.4 milliGRAM(s) Oral at bedtime  valACYclovir 500 milliGRAM(s) Oral two times a day    MEDICATIONS  (PRN):  acetaminophen     Tablet .. 650 milliGRAM(s) Oral every 6 hours PRN Mild Pain (1 - 3), Moderate Pain (4 - 6)  ondansetron    Tablet 8 milliGRAM(s) Oral every 8 hours PRN for indigestion  traMADol 25 milliGRAM(s) Oral every 8 hours PRN moderate to severe pain

## 2023-08-24 NOTE — DIETITIAN INITIAL EVALUATION ADULT - OTHER INFO
89 year old male with a PMH of Multiple myeloma, Hypertension, Dyslipidemia, Macular degeneration, Hearing difficulty (bilateral), and Gait difficulty (uses walker), presented to the ED secondary to loss of consciousness per chart.    Patient reports good appetite, but doesn't like current diet consistency. States no chewing/swallowing difficulties PTA. No GI distress. Has no food allergies. Per HIE, noted w/ weights 69.6 kg (7/11) and 65.8 kg (5/3). No weight noted in chart. Bed scale stating 69 kg (8/24) per observation, will monitor. No edema or pressure injuries per RN flow sheet.

## 2023-08-24 NOTE — DIETITIAN INITIAL EVALUATION ADULT - PATIENT MEETS CRITERIA FOR MALNUTRITION
Weight Management. 1. Have you been to the ER, urgent care clinic since your last visit? Hospitalized since your last visit? No    2. Have you seen or consulted any other health care providers outside of the [address] [place] [address] since your last visit? Include any pap smears or colon screening.  No no

## 2023-08-24 NOTE — DISCHARGE NOTE PROVIDER - NSFOLLOWUPCLINICS_GEN_ALL_ED_FT
[facility] Rheumatology  Rheumatology  [address] [address]  Phone: [phone]  Fax:   Follow Up Time: Routine

## 2023-08-24 NOTE — DISCHARGE NOTE PROVIDER - NSDCFUSCHEDAPPT_GEN_ALL_CORE_FT
[facility]  [facility]  Scheduled Appointment: 09/05/2023    [facility]  [facility]  Scheduled Appointment: 09/05/2023    [facility]  [facility]  Scheduled Appointment: 09/18/2023    [facility]  [facility]  Scheduled Appointment: 09/18/2023    Janessa Mcintyre  [facility]  [facility]  Scheduled Appointment: 09/18/2023    [facility]  [facility]  Scheduled Appointment: 10/02/2023    [facility]  [facility]  Scheduled Appointment: 10/02/2023

## 2023-08-24 NOTE — DISCHARGE NOTE PROVIDER - NSDCCPCAREPLAN_GEN_ALL_CORE_FT
PRINCIPAL DISCHARGE DIAGNOSIS  Diagnosis: Syncope  Assessment and Plan of Treatment: You were admitted to the hospital because you passed out. This was likely because of dehydration. Please continue to drink water when you are thirsty. Please see your primary doctor after leaving the hospital.      SECONDARY DISCHARGE DIAGNOSES  Diagnosis: Acute respiratory failure with hypoxia  Assessment and Plan of Treatment: Your oxygen levels were low and you briefly required oxygen in the hospital. You no longer require oxygen. Please see your primary care doctor.    Diagnosis: Swelling of right hand  Assessment and Plan of Treatment: Your hand was swollen and you had signs of arhtritis on x-ray. You were evaluated by a joint specialist (rheumatologist) and received steroids to reduce the inflammation in your hand.     PRINCIPAL DISCHARGE DIAGNOSIS  Diagnosis: Syncope  Assessment and Plan of Treatment: You were admitted to the hospital because you passed out. This was likely because of dehydration. Please continue to drink water when you are thirsty. Please see your primary doctor after leaving the hospital.      SECONDARY DISCHARGE DIAGNOSES  Diagnosis: Swelling of right hand  Assessment and Plan of Treatment: Your hand was swollen and you had signs of arhtritis on x-ray. You were evaluated by a joint specialist (rheumatologist) and received steroids to reduce the inflammation in your hand. Continue steroid taper as prescribed. Patient already received 2 days worth of the 20mg part of taper on 8/29 and 8/30, continue with 20mg on 8/31 and then reduce taper to 10mg as prescribed.    Diagnosis: Acute respiratory failure with hypoxia  Assessment and Plan of Treatment: Your oxygen levels were low and you briefly required oxygen in the hospital. You no longer require oxygen. Please see your primary care doctor.

## 2023-08-24 NOTE — DIETITIAN INITIAL EVALUATION ADULT - ADD RECOMMEND
Swallow evaluation to determine appropriateness of diet consistency. Nutrition department will provide Hormel Vital Shake 1x daily (520 kcal, 22 g pro) and magic cup 1x daily (290 kcal, 9 g pro). Monitor PO intake.

## 2023-08-25 LAB
ANION GAP SERPL CALC-SCNC: 14 MMOL/L — SIGNIFICANT CHANGE UP (ref 7–14)
BUN SERPL-MCNC: 17 MG/DL — SIGNIFICANT CHANGE UP (ref 7–23)
CALCIUM SERPL-MCNC: 7.6 MG/DL — LOW (ref 8.4–10.5)
CHLORIDE SERPL-SCNC: 105 MMOL/L — SIGNIFICANT CHANGE UP (ref 98–107)
CO2 SERPL-SCNC: 17 MMOL/L — LOW (ref 22–31)
CREAT SERPL-MCNC: 0.97 MG/DL — SIGNIFICANT CHANGE UP (ref 0.5–1.3)
EGFR: 74 ML/MIN/1.73M2 — SIGNIFICANT CHANGE UP
GLUCOSE SERPL-MCNC: 107 MG/DL — HIGH (ref 70–99)
HCT VFR BLD CALC: 29.3 % — LOW (ref 39–50)
HGB BLD-MCNC: 10.1 G/DL — LOW (ref 13–17)
MAGNESIUM SERPL-MCNC: 1.7 MG/DL — SIGNIFICANT CHANGE UP (ref 1.6–2.6)
MCHC RBC-ENTMCNC: 32.7 PG — SIGNIFICANT CHANGE UP (ref 27–34)
MCHC RBC-ENTMCNC: 34.5 GM/DL — SIGNIFICANT CHANGE UP (ref 32–36)
MCV RBC AUTO: 94.8 FL — SIGNIFICANT CHANGE UP (ref 80–100)
NRBC # BLD: 0 /100 WBCS — SIGNIFICANT CHANGE UP (ref 0–0)
NRBC # FLD: 0 K/UL — SIGNIFICANT CHANGE UP (ref 0–0)
PHOSPHATE SERPL-MCNC: 2.5 MG/DL — SIGNIFICANT CHANGE UP (ref 2.5–4.5)
PLATELET # BLD AUTO: 188 K/UL — SIGNIFICANT CHANGE UP (ref 150–400)
POTASSIUM SERPL-MCNC: 3.8 MMOL/L — SIGNIFICANT CHANGE UP (ref 3.5–5.3)
POTASSIUM SERPL-SCNC: 3.8 MMOL/L — SIGNIFICANT CHANGE UP (ref 3.5–5.3)
RBC # BLD: 3.09 M/UL — LOW (ref 4.2–5.8)
RBC # FLD: 14 % — SIGNIFICANT CHANGE UP (ref 10.3–14.5)
SODIUM SERPL-SCNC: 136 MMOL/L — SIGNIFICANT CHANGE UP (ref 135–145)
WBC # BLD: 5.02 K/UL — SIGNIFICANT CHANGE UP (ref 3.8–10.5)
WBC # FLD AUTO: 5.02 K/UL — SIGNIFICANT CHANGE UP (ref 3.8–10.5)

## 2023-08-25 PROCEDURE — 99232 SBSQ HOSP IP/OBS MODERATE 35: CPT

## 2023-08-25 RX ORDER — LANOLIN ALCOHOL/MO/W.PET/CERES
3 CREAM (GRAM) TOPICAL AT BEDTIME
Refills: 0 | Status: DISCONTINUED | OUTPATIENT
Start: 2023-08-25 | End: 2023-08-30

## 2023-08-25 RX ADMIN — HEPARIN SODIUM 5000 UNIT(S): 5000 INJECTION INTRAVENOUS; SUBCUTANEOUS at 05:24

## 2023-08-25 RX ADMIN — Medication 2000 UNIT(S): at 11:42

## 2023-08-25 RX ADMIN — HEPARIN SODIUM 5000 UNIT(S): 5000 INJECTION INTRAVENOUS; SUBCUTANEOUS at 18:02

## 2023-08-25 RX ADMIN — POLYETHYLENE GLYCOL 3350 17 GRAM(S): 17 POWDER, FOR SOLUTION ORAL at 11:43

## 2023-08-25 RX ADMIN — Medication 1 TABLET(S): at 11:42

## 2023-08-25 RX ADMIN — Medication 3 MILLIGRAM(S): at 21:56

## 2023-08-25 RX ADMIN — TRAMADOL HYDROCHLORIDE 25 MILLIGRAM(S): 50 TABLET ORAL at 12:43

## 2023-08-25 RX ADMIN — Medication 100 MILLIGRAM(S): at 11:42

## 2023-08-25 RX ADMIN — TAMSULOSIN HYDROCHLORIDE 0.4 MILLIGRAM(S): 0.4 CAPSULE ORAL at 21:56

## 2023-08-25 RX ADMIN — VALACYCLOVIR 500 MILLIGRAM(S): 500 TABLET, FILM COATED ORAL at 18:01

## 2023-08-25 RX ADMIN — SENNA PLUS 2 TABLET(S): 8.6 TABLET ORAL at 21:56

## 2023-08-25 RX ADMIN — VALACYCLOVIR 500 MILLIGRAM(S): 500 TABLET, FILM COATED ORAL at 05:25

## 2023-08-25 NOTE — PROGRESS NOTE ADULT - SUBJECTIVE AND OBJECTIVE BOX
Patient is a 89y old  Male who presents with a chief complaint of Syncope (24 Aug 2023 17:37)      SUBJECTIVE / OVERNIGHT EVENTS:    No events overnight. This AM, patient without n/v/d/cp/sob.      MEDICATIONS  (STANDING):  allopurinol 100 milliGRAM(s) Oral daily  cholecalciferol 2000 Unit(s) Oral daily  heparin   Injectable 5000 Unit(s) SubCutaneous every 12 hours  multivitamin 1 Tablet(s) Oral daily  polyethylene glycol 3350 17 Gram(s) Oral daily  senna 2 Tablet(s) Oral at bedtime  tamsulosin 0.4 milliGRAM(s) Oral at bedtime  valACYclovir 500 milliGRAM(s) Oral two times a day    MEDICATIONS  (PRN):  acetaminophen     Tablet .. 650 milliGRAM(s) Oral every 6 hours PRN Mild Pain (1 - 3), Moderate Pain (4 - 6)  ondansetron    Tablet 8 milliGRAM(s) Oral every 8 hours PRN for indigestion  traMADol 25 milliGRAM(s) Oral every 8 hours PRN moderate to severe pain      PHYSICAL EXAM:  T(C): 36.8 (08-25-23 @ 10:16), Max: 37 (08-25-23 @ 07:56)  HR: 65 (08-25-23 @ 10:16) (65 - 78)  BP: 137/69 (08-25-23 @ 10:16) (109/63 - 137/69)  RR: 16 (08-25-23 @ 10:16) (16 - 18)  SpO2: 98% (08-25-23 @ 10:16) (96% - 99%)  I&O's Summary    GENERAL: NAD, lying in bed  HEAD:  Atraumatic, Normocephalic, MMM  CHEST/LUNG: No use of accessory muscles, CTAB, breathing non-labored; using O2 via NC  COR: RR, no mrcg  ABD: Soft, ND/NT, +BS  PSYCH: AAOx3  NEUROLOGY: CN II-XII grossly intact, moving all extremities  SKIN: No rashes or lesions  EXT: wwp, no cce    LABS:  CAPILLARY BLOOD GLUCOSE                              10.1   5.02  )-----------( 188      ( 25 Aug 2023 05:00 )             29.3     08-25    136  |  105  |  17  ----------------------------<  107<H>  3.8   |  17<L>  |  0.97    Ca    7.6<L>      25 Aug 2023 05:00  Phos  2.5     08-25  Mg     1.70     08-25    TPro  5.1<L>  /  Alb  2.4<L>  /  TBili  0.6  /  DBili  0.2  /  AST  17  /  ALT  19  /  AlkPhos  102  08-24          Urinalysis Basic - ( 25 Aug 2023 05:00 )    Color: x / Appearance: x / SG: x / pH: x  Gluc: 107 mg/dL / Ketone: x  / Bili: x / Urobili: x   Blood: x / Protein: x / Nitrite: x   Leuk Esterase: x / RBC: x / WBC x   Sq Epi: x / Non Sq Epi: x / Bacteria: x          RADIOLOGY & ADDITIONAL TESTS:    Telemetry Personally Reviewed - Afib, PVCs    Imaging Personally Reviewed -     Imaging Reviewed -     Consultant(s) Notes Reviewed -       Care Discussed with Consultants/Other Providers -

## 2023-08-26 LAB
ANION GAP SERPL CALC-SCNC: 13 MMOL/L — SIGNIFICANT CHANGE UP (ref 7–14)
BUN SERPL-MCNC: 16 MG/DL — SIGNIFICANT CHANGE UP (ref 7–23)
CALCIUM SERPL-MCNC: 7.5 MG/DL — LOW (ref 8.4–10.5)
CHLORIDE SERPL-SCNC: 105 MMOL/L — SIGNIFICANT CHANGE UP (ref 98–107)
CO2 SERPL-SCNC: 18 MMOL/L — LOW (ref 22–31)
CREAT SERPL-MCNC: 0.97 MG/DL — SIGNIFICANT CHANGE UP (ref 0.5–1.3)
CULTURE RESULTS: SIGNIFICANT CHANGE UP
CULTURE RESULTS: SIGNIFICANT CHANGE UP
EGFR: 74 ML/MIN/1.73M2 — SIGNIFICANT CHANGE UP
GLUCOSE SERPL-MCNC: 118 MG/DL — HIGH (ref 70–99)
HCT VFR BLD CALC: 28.9 % — LOW (ref 39–50)
HGB BLD-MCNC: 9.5 G/DL — LOW (ref 13–17)
MAGNESIUM SERPL-MCNC: 1.7 MG/DL — SIGNIFICANT CHANGE UP (ref 1.6–2.6)
MCHC RBC-ENTMCNC: 31.6 PG — SIGNIFICANT CHANGE UP (ref 27–34)
MCHC RBC-ENTMCNC: 32.9 GM/DL — SIGNIFICANT CHANGE UP (ref 32–36)
MCV RBC AUTO: 96 FL — SIGNIFICANT CHANGE UP (ref 80–100)
NRBC # BLD: 0 /100 WBCS — SIGNIFICANT CHANGE UP (ref 0–0)
NRBC # FLD: 0 K/UL — SIGNIFICANT CHANGE UP (ref 0–0)
PHOSPHATE SERPL-MCNC: 2.3 MG/DL — LOW (ref 2.5–4.5)
PLATELET # BLD AUTO: 229 K/UL — SIGNIFICANT CHANGE UP (ref 150–400)
POTASSIUM SERPL-MCNC: 3.7 MMOL/L — SIGNIFICANT CHANGE UP (ref 3.5–5.3)
POTASSIUM SERPL-SCNC: 3.7 MMOL/L — SIGNIFICANT CHANGE UP (ref 3.5–5.3)
RBC # BLD: 3.01 M/UL — LOW (ref 4.2–5.8)
RBC # FLD: 14 % — SIGNIFICANT CHANGE UP (ref 10.3–14.5)
SODIUM SERPL-SCNC: 136 MMOL/L — SIGNIFICANT CHANGE UP (ref 135–145)
SPECIMEN SOURCE: SIGNIFICANT CHANGE UP
SPECIMEN SOURCE: SIGNIFICANT CHANGE UP
WBC # BLD: 4.74 K/UL — SIGNIFICANT CHANGE UP (ref 3.8–10.5)
WBC # FLD AUTO: 4.74 K/UL — SIGNIFICANT CHANGE UP (ref 3.8–10.5)

## 2023-08-26 PROCEDURE — 99232 SBSQ HOSP IP/OBS MODERATE 35: CPT

## 2023-08-26 PROCEDURE — 99223 1ST HOSP IP/OBS HIGH 75: CPT | Mod: GC

## 2023-08-26 RX ADMIN — TAMSULOSIN HYDROCHLORIDE 0.4 MILLIGRAM(S): 0.4 CAPSULE ORAL at 22:13

## 2023-08-26 RX ADMIN — Medication 3 MILLIGRAM(S): at 22:13

## 2023-08-26 RX ADMIN — Medication 650 MILLIGRAM(S): at 11:23

## 2023-08-26 RX ADMIN — Medication 500 MILLIGRAM(S): at 11:23

## 2023-08-26 RX ADMIN — Medication 500 MILLIGRAM(S): at 18:00

## 2023-08-26 RX ADMIN — VALACYCLOVIR 500 MILLIGRAM(S): 500 TABLET, FILM COATED ORAL at 17:16

## 2023-08-26 RX ADMIN — Medication 100 MILLIGRAM(S): at 11:28

## 2023-08-26 RX ADMIN — SENNA PLUS 2 TABLET(S): 8.6 TABLET ORAL at 22:13

## 2023-08-26 RX ADMIN — VALACYCLOVIR 500 MILLIGRAM(S): 500 TABLET, FILM COATED ORAL at 05:57

## 2023-08-26 RX ADMIN — Medication 2000 UNIT(S): at 11:29

## 2023-08-26 RX ADMIN — Medication 500 MILLIGRAM(S): at 17:16

## 2023-08-26 RX ADMIN — Medication 1 TABLET(S): at 11:28

## 2023-08-26 RX ADMIN — Medication 500 MILLIGRAM(S): at 10:10

## 2023-08-26 RX ADMIN — Medication 650 MILLIGRAM(S): at 10:10

## 2023-08-26 RX ADMIN — HEPARIN SODIUM 5000 UNIT(S): 5000 INJECTION INTRAVENOUS; SUBCUTANEOUS at 17:16

## 2023-08-26 RX ADMIN — POLYETHYLENE GLYCOL 3350 17 GRAM(S): 17 POWDER, FOR SOLUTION ORAL at 11:28

## 2023-08-26 RX ADMIN — HEPARIN SODIUM 5000 UNIT(S): 5000 INJECTION INTRAVENOUS; SUBCUTANEOUS at 05:56

## 2023-08-26 RX ADMIN — Medication 20 MILLIGRAM(S): at 18:50

## 2023-08-26 NOTE — SWALLOW BEDSIDE ASSESSMENT ADULT - PHARYNGEAL PHASE
Within functional limits Wet vocal quality post oral intake/Throat clear post oral intake/Delayed cough post oral intake

## 2023-08-26 NOTE — CONSULT NOTE ADULT - SUBJECTIVE AND OBJECTIVE BOX
***consult has been received. note is in progress and incomplete without attending attestation***    Cedric Mcbride MD, PGY-4  Rheumatology Fellow  Reachable on TEAMS    TRISTAN MCGRATH  [id number]    HISTORY OF PRESENT ILLNESS:    89 year old male, with past history significant for Multiple myeloma, Hypertension, Dyslipidemia, Macular degeneration, Hearing difficulty (bilateral), Forgetfulness and Gait difficulty (uses walker), presented to the ED, from the [facility] center, secondary to loss of consciousness.  Seen and evaluated at bedside with health care  at bedside; NAD.  Forgetful.  Patient unable to recall events relative to the syncope.  Per caregiver, patient had lab-work performed at the cancer center today, and while waiting to undergo chemotherapy, indicated wanting to use the toilet.  While on the commode, patient called for the caregiver who was standing outside the door, who went to assist the patient.  Noted to have had diarrhea.  As patient stood up, he complained of not feeling well and sat down again on the commode, slumping over and becoming unresponsive at the same time.  Caregiver caught the patient, thereby, preventing any trauma to the body.  Per caregiver, patient lost consciousness for a few seconds and appeared gray in color, with coldness of the hands.  Pulse oximetry was difficult to obtain, but blood pressure at the time = 72/42.  Patient complained of left sided head pain, which resolved after receiving Tylenol.  Additional information, as told to care provider by an individual who saw the patient over the weekend, patient had diarrhea over the weekend.  Patient does not recall this occurring.  No report of fever, chills, nausea, vomiting.  Patient reports inadequate fluid intake, but eats well.  States that he does have difficulty with short term memory.  Reports occasional palpitations.    Vital signs upon ED presentation as follows: BP = 89/51, HR = 58, RR = 16, T = 36.3 C (97.4 F), O2 Sat = 96% on 4L NC.  Diagnosed with Syncope and prescribed NaCl one liter and Tylenol 1 gram in the ED. (21 Aug 2023 21:55)    Rheumatology consulted for R hand pain/swelling     C1D1 on 6/26/23, he began Helen-Rd (daratumumab + dexamethasone every 2 weeks for 8 doses) with PO Revlimid (days 21 out of 28-day cycles)  - Was scheduled for (5th dose) C3D1 of helen/dex on 8/21/23 but transferred to [facility] for hypotension, syncope, and fatigue  last dose Daratumumab 7/24    Febrile to 101.3 on 8/22 in the ED. Blood culture from 8/21 drawn before the fever have NGTD. GI PCR never collected however diarrhea resolved  Rheum ROS    Denies fevers/chills/weight loss/night sweats/alopecia/sinus disease/asthma history/oral ulcers/dysphagia/vision changes/Raynauds/VTE/miscarraiges/sicca symptoms/urinary changes/edema/SOB/joint pain/swelling/jaw claudication/vision changes/rash/photosensitivity/morning stiffness    Endorses fevers/chills/weight loss/night sweats/alopecia/sinus disease/asthma history/oral ulcers/dysphagia/vision changes/Raynauds/VTE/miscarraiges/sicca symptoms/urinary changes/edema/SOB/joint pain/swelling/jaw claudication/vision changes/rash/photosensitivity/morning stiffness      MEDICATIONS  (STANDING):  allopurinol 100 milliGRAM(s) Oral daily  cholecalciferol 2000 Unit(s) Oral daily  heparin   Injectable 5000 Unit(s) SubCutaneous every 12 hours  melatonin 3 milliGRAM(s) Oral at bedtime  multivitamin 1 Tablet(s) Oral daily  naproxen 500 milliGRAM(s) Oral two times a day  polyethylene glycol 3350 17 Gram(s) Oral daily  senna 2 Tablet(s) Oral at bedtime  tamsulosin 0.4 milliGRAM(s) Oral at bedtime  valACYclovir 500 milliGRAM(s) Oral two times a day    MEDICATIONS  (PRN):  acetaminophen     Tablet .. 650 milliGRAM(s) Oral every 6 hours PRN Mild Pain (1 - 3), Moderate Pain (4 - 6)  ondansetron    Tablet 8 milliGRAM(s) Oral every 8 hours PRN for indigestion  traMADol 25 milliGRAM(s) Oral every 8 hours PRN moderate to severe pain      Allergies    No Known Allergies    Intolerances        PERTINENT MEDICATION HISTORY:    SOCIAL HISTORY:  OCCUPATION:  TRAVEL HISTORY:    FAMILY HISTORY:  No family history of malignant neoplasm        Vital Signs Last 24 Hrs  T(C): 36.6 (26 Aug 2023 09:30), Max: 37.8 (25 Aug 2023 17:17)  T(F): 97.9 (26 Aug 2023 09:30), Max: 100 (25 Aug 2023 17:17)  HR: 70 (26 Aug 2023 09:30) (62 - 72)  BP: 137/57 (26 Aug 2023 09:30) (126/64 - 149/63)  BP(mean): --  RR: 18 (26 Aug 2023 09:30) (18 - 18)  SpO2: 96% (26 Aug 2023 09:30) (96% - 100%)    Parameters below as of 26 Aug 2023 09:30  Patient On (Oxygen Delivery Method): room air        Physical Exam:  General: No apparent distress  HEENT: EOMI, MMM  CVS: +S1/S2, RRR, no murmurs/rubs/gallops  Resp: CTA b/l. No crackles/wheezing  GI: Soft, NT/ND +BS  MSK: no swelling/warmth/erythema of the joints of the UE/LE  Neuro: AAOx3  Skin: no visible rashes    LABS:                        9.5    4.74  )-----------( 229      ( 26 Aug 2023 04:47 )             28.9     08-26    136  |  105  |  16  ----------------------------<  118<H>  3.7   |  18<L>  |  0.97    Ca    7.5<L>      26 Aug 2023 04:47  Phos  2.3     08-26  Mg     1.70     08-26    Uric Acid: 2.8 mg/dL (08.23.23 @ 05:40)      Urinalysis Basic - ( 26 Aug 2023 04:47 )    Color: x / Appearance: x / SG: x / pH: x  Gluc: 118 mg/dL / Ketone: x  / Bili: x / Urobili: x   Blood: x / Protein: x / Nitrite: x   Leuk Esterase: x / RBC: x / WBC x   Sq Epi: x / Non Sq Epi: x / Bacteria: x    Rheumatology Work Up        RADIOLOGY & ADDITIONAL STUDIES:     Cedric Mcbride MD, PGY-5  Rheumatology Fellow  Reachable on TEAMS    TRISTAN MCGRATH  [id number]    HISTORY OF PRESENT ILLNESS:    89 year old male, with past history significant for Multiple myeloma, Hypertension, Dyslipidemia, Macular degeneration, Hearing difficulty (bilateral), Forgetfulness and Gait difficulty (uses walker), presented to the ED, from the [facility] center, secondary to loss of consciousness.  Seen and evaluated at bedside with health care  at bedside; NAD.  Forgetful.  Patient unable to recall events relative to the syncope.  Per caregiver, patient had lab-work performed at the cancer center today, and while waiting to undergo chemotherapy, indicated wanting to use the toilet.  While on the commode, patient called for the caregiver who was standing outside the door, who went to assist the patient.  Noted to have had diarrhea.  As patient stood up, he complained of not feeling well and sat down again on the commode, slumping over and becoming unresponsive at the same time.  Caregiver caught the patient, thereby, preventing any trauma to the body.  Per caregiver, patient lost consciousness for a few seconds and appeared gray in color, with coldness of the hands.  Pulse oximetry was difficult to obtain, but blood pressure at the time = 72/42.  Patient complained of left sided head pain, which resolved after receiving Tylenol.  Additional information, as told to care provider by an individual who saw the patient over the weekend, patient had diarrhea over the weekend.  Patient does not recall this occurring.  No report of fever, chills, nausea, vomiting.  Patient reports inadequate fluid intake, but eats well.  States that he does have difficulty with short term memory.  Reports occasional palpitations.    Vital signs upon ED presentation as follows: BP = 89/51, HR = 58, RR = 16, T = 36.3 C (97.4 F), O2 Sat = 96% on 4L NC.  Diagnosed with Syncope and prescribed NaCl one liter and Tylenol 1 gram in the ED. (21 Aug 2023 21:55)    Rheumatology consulted for R hand pain/swelling. Patient is a poor historian. States that he has had chronic right hand pain/swelling for the past 10-15 years (attributes to playing tennis). States his current joint pain is stable compared to his baseline. States that his chemo treatment does not affect his pain. Denies pain/swelling in any other joint. Denies history of gout. Denies history of psoriasis or autoimmune disease. No GI history. Has history of macular degeneration, but otherwise no red/painful eyes or acute eye symptoms at this time. Denies morning stiffness. Hand pain improved with rest    Febrile to 101.3 on 8/22 in the ED. Blood culture from 8/21 drawn before the fever have NGTD. GI PCR never collected however diarrhea resolved    Rheum ROS    Denies fevers/chills//sinus disease//oral ulcers/dysphagia/vision changes/Raynauds/VTE//sicca symptoms/urinary changes//rash/photosensitivity/morning stiffness      MEDICATIONS  (STANDING):  allopurinol 100 milliGRAM(s) Oral daily  cholecalciferol 2000 Unit(s) Oral daily  heparin   Injectable 5000 Unit(s) SubCutaneous every 12 hours  melatonin 3 milliGRAM(s) Oral at bedtime  multivitamin 1 Tablet(s) Oral daily  naproxen 500 milliGRAM(s) Oral two times a day  polyethylene glycol 3350 17 Gram(s) Oral daily  senna 2 Tablet(s) Oral at bedtime  tamsulosin 0.4 milliGRAM(s) Oral at bedtime  valACYclovir 500 milliGRAM(s) Oral two times a day    MEDICATIONS  (PRN):  acetaminophen     Tablet .. 650 milliGRAM(s) Oral every 6 hours PRN Mild Pain (1 - 3), Moderate Pain (4 - 6)  ondansetron    Tablet 8 milliGRAM(s) Oral every 8 hours PRN for indigestion  traMADol 25 milliGRAM(s) Oral every 8 hours PRN moderate to severe pain      Allergies    No Known Allergies    Intolerances      FAMILY HISTORY:  No family history of malignant neoplasm  no known history of autoimmune disease        Vital Signs Last 24 Hrs  T(C): 36.6 (26 Aug 2023 09:30), Max: 37.8 (25 Aug 2023 17:17)  T(F): 97.9 (26 Aug 2023 09:30), Max: 100 (25 Aug 2023 17:17)  HR: 70 (26 Aug 2023 09:30) (62 - 72)  BP: 137/57 (26 Aug 2023 09:30) (126/64 - 149/63)  BP(mean): --  RR: 18 (26 Aug 2023 09:30) (18 - 18)  SpO2: 96% (26 Aug 2023 09:30) (96% - 100%)    Parameters below as of 26 Aug 2023 09:30  Patient On (Oxygen Delivery Method): room air      Physical Exam:  General: No apparent distress  HEENT: EOMI, MMM. no oral ulcers  CVS: +S1/S2, RRR,   Resp: CTA b/l. No crackles/wheezing  MSK: R wrist with slight swelling and warmth. no erythema. erythema noted over 2nd/3rf MCP as well as 4th DIP/ 3rd MCP, 2nd MCP. swelling appreciated over 4th finger as well as 3rd MCP. bony nodules appreciated in 3rd DIP 2nd DIP. no swelling/warmth/erythema of the joints of the other joints of the UE/LE  Neuro: AAOx3  Skin: no tophii. scaly rash on RUE. not c/w Psoriasis. more c/w aktinic keratosis    LABS:                        9.5    4.74  )-----------( 229      ( 26 Aug 2023 04:47 )             28.9     08-26    136  |  105  |  16  ----------------------------<  118<H>  3.7   |  18<L>  |  0.97    Ca    7.5<L>      26 Aug 2023 04:47  Phos  2.3     08-26  Mg     1.70     08-26    Uric Acid: 2.8 mg/dL (08.23.23 @ 05:40)      Urinalysis Basic - ( 26 Aug 2023 04:47 )    Color: x / Appearance: x / SG: x / pH: x  Gluc: 118 mg/dL / Ketone: x  / Bili: x / Urobili: x   Blood: x / Protein: x / Nitrite: x   Leuk Esterase: x / RBC: x / WBC x   Sq Epi: x / Non Sq Epi: x / Bacteria: x    Rheumatology Work Up      RADIOLOGY & ADDITIONAL STUDIES:  < from: Xray Hand 3 Views, Right (08.23.23 @ 14:11) >    ACC: [id number] EXAM:  XR HAND MIN 3 VIEWS RT   ORDERED BY: LB KEENE     PROCEDURE DATE:  08/23/2023          INTERPRETATION:  CLINICAL INDICATION: Right hand pain and swelling.    TECHNIQUE: 3 views right hand.    COMPARISON: None.    FINDINGS:  The bones are demineralized. There is extensive joint space narrowing   with central subchondral erosions and marginal osteophyte formation at   the PIP and DIP joints across the fingers. Findings are suggestive of an   underlying inflammatory arthropathy such as erosive osteoarthritis or   psoriatic arthritis. No acute fracture or dislocation. 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 Chondrocalcinosis involving the   TFCC.      IMPRESSION:  Extensive joint space narrowing withcentral subchondral erosions and   marginal osteophyte formation at the PIP and DIP joints across the   fingers. Findings are suggestive of an underlying inflammatory   arthropathy such as erosive osteoarthritis or psoriatic arthritis.    --- End of Report ---    < end of copied text >     Cedric Mcbride MD, PGY-5  Rheumatology Fellow  Reachable on TEAMS    TRISTAN MCGRATH  [id number]    HISTORY OF PRESENT ILLNESS:    89 year old male, with past history significant for Multiple myeloma, Hypertension, Dyslipidemia, Macular degeneration, Hearing difficulty (bilateral), Forgetfulness and Gait difficulty (uses walker), presented to the ED, from the [facility] center, secondary to loss of consciousness.  Seen and evaluated at bedside with health care  at bedside; NAD.  Forgetful.  Patient unable to recall events relative to the syncope.  Per caregiver, patient had lab-work performed at the cancer center today, and while waiting to undergo chemotherapy, indicated wanting to use the toilet.  While on the commode, patient called for the caregiver who was standing outside the door, who went to assist the patient.  Noted to have had diarrhea.  As patient stood up, he complained of not feeling well and sat down again on the commode, slumping over and becoming unresponsive at the same time.  Caregiver caught the patient, thereby, preventing any trauma to the body.  Per caregiver, patient lost consciousness for a few seconds and appeared gray in color, with coldness of the hands.  Pulse oximetry was difficult to obtain, but blood pressure at the time = 72/42.  Patient complained of left sided head pain, which resolved after receiving Tylenol.  Additional information, as told to care provider by an individual who saw the patient over the weekend, patient had diarrhea over the weekend.  Patient does not recall this occurring.  No report of fever, chills, nausea, vomiting.  Patient reports inadequate fluid intake, but eats well.  States that he does have difficulty with short term memory.  Reports occasional palpitations.    Vital signs upon ED presentation as follows: BP = 89/51, HR = 58, RR = 16, T = 36.3 C (97.4 F), O2 Sat = 96% on 4L NC.  Diagnosed with Syncope and prescribed NaCl one liter and Tylenol 1 gram in the ED. (21 Aug 2023 21:55)    Rheumatology consulted for R hand pain/swelling. Patient is a poor historian. States that he has had chronic right hand pain/swelling for the past 10-15 years (attributes to playing tennis). States his current joint pain is stable compared to his baseline. States that his chemo treatment does not affect his pain. Denies pain/swelling in any other joint. Denies history of gout. Denies history of psoriasis or autoimmune disease. No GI history. Has history of macular degeneration, but otherwise no red/painful eyes or acute eye symptoms at this time. Denies morning stiffness. Hand pain improved with rest    Febrile to 101.3 on 8/22 in the ED. Blood culture from 8/21 drawn before the fever have NGTD. GI PCR never collected however diarrhea resolved    Rheum ROS  Denies fevers/chills//sinus disease//oral ulcers/dysphagia/vision changes/Raynauds/VTE//sicca symptoms/urinary changes//rash/photosensitivity/morning stiffness      MEDICATIONS  (STANDING):  allopurinol 100 milliGRAM(s) Oral daily  cholecalciferol 2000 Unit(s) Oral daily  heparin   Injectable 5000 Unit(s) SubCutaneous every 12 hours  melatonin 3 milliGRAM(s) Oral at bedtime  multivitamin 1 Tablet(s) Oral daily  naproxen 500 milliGRAM(s) Oral two times a day  polyethylene glycol 3350 17 Gram(s) Oral daily  senna 2 Tablet(s) Oral at bedtime  tamsulosin 0.4 milliGRAM(s) Oral at bedtime  valACYclovir 500 milliGRAM(s) Oral two times a day    MEDICATIONS  (PRN):  acetaminophen     Tablet .. 650 milliGRAM(s) Oral every 6 hours PRN Mild Pain (1 - 3), Moderate Pain (4 - 6)  ondansetron    Tablet 8 milliGRAM(s) Oral every 8 hours PRN for indigestion  traMADol 25 milliGRAM(s) Oral every 8 hours PRN moderate to severe pain      Allergies    No Known Allergies    Intolerances      FAMILY HISTORY:  No family history of malignant neoplasm  no known history of autoimmune disease    Vital Signs Last 24 Hrs  T(C): 36.6 (26 Aug 2023 09:30), Max: 37.8 (25 Aug 2023 17:17)  T(F): 97.9 (26 Aug 2023 09:30), Max: 100 (25 Aug 2023 17:17)  HR: 70 (26 Aug 2023 09:30) (62 - 72)  BP: 137/57 (26 Aug 2023 09:30) (126/64 - 149/63)  RR: 18 (26 Aug 2023 09:30) (18 - 18)  SpO2: 96% (26 Aug 2023 09:30) (96% - 100%)    Parameters below as of 26 Aug 2023 09:30  Patient On (Oxygen Delivery Method): room air    Physical Exam:  General: No apparent distress  HEENT: EOMI, MMM. no oral ulcers  CVS: +S1/S2, RRR,   Resp: CTA b/l. No crackles/wheezing  MSK: R wrist with slight swelling and warmth but no erythema. + erythema and synovitis noted over 2nd/3rd MCPs as well as scattered PIPs and DIPs. bony nodules appreciated over scattered DIPs. no swelling/warmth/erythema of the joints of the UE/LE  Neuro: AAOx3  Skin: no tophi scaly rash on RUE, not c/w Psoriasis. more c/w actinic keratosis    LABS:                        9.5    4.74  )-----------( 229      ( 26 Aug 2023 04:47 )             28.9     08-26    136  |  105  |  16  ----------------------------<  118<H>  3.7   |  18<L>  |  0.97    Ca    7.5<L>      26 Aug 2023 04:47  Phos  2.3     08-26  Mg     1.70     08-26    Uric Acid: 2.8 mg/dL (08.23.23 @ 05:40)      Urinalysis Basic - ( 26 Aug 2023 04:47 )    Color: x / Appearance: x / SG: x / pH: x  Gluc: 118 mg/dL / Ketone: x  / Bili: x / Urobili: x   Blood: x / Protein: x / Nitrite: x   Leuk Esterase: x / RBC: x / WBC x   Sq Epi: x / Non Sq Epi: x / Bacteria: x    Rheumatology Work Up      RADIOLOGY & ADDITIONAL STUDIES:  < from: Xray Hand 3 Views, Right (08.23.23 @ 14:11) >    ACC: [id number] EXAM:  XR HAND MIN 3 VIEWS RT   ORDERED BY: LB KEENE     PROCEDURE DATE:  08/23/2023          INTERPRETATION:  CLINICAL INDICATION: Right hand pain and swelling.    TECHNIQUE: 3 views right hand.    COMPARISON: None.    FINDINGS:  The bones are demineralized. There is extensive joint space narrowing   with central subchondral erosions and marginal osteophyte formation at   the PIP and DIP joints across the fingers. Findings are suggestive of an   underlying inflammatory arthropathy such as erosive osteoarthritis or   psoriatic arthritis. No acute fracture or dislocation. 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 Chondrocalcinosis involving the   TFCC.      IMPRESSION:  Extensive joint space narrowing withcentral subchondral erosions and   marginal osteophyte formation at the PIP and DIP joints across the   fingers. Findings are suggestive of an underlying inflammatory   arthropathy such as erosive osteoarthritis or psoriatic arthritis.    --- End of Report ---    < end of copied text >

## 2023-08-26 NOTE — SWALLOW BEDSIDE ASSESSMENT ADULT - SWALLOW EVAL: DIAGNOSIS
1. Functional oral stage puree, soft and bite-sized, regular solids, moderately thick liquids, mildly thick liquids and thin liquids characterized by adequate acceptance and containment, adequate mastication of regular solids, adequate anterior to posterior transport with adequate oral clearance. 2. Functional pharyngeal stage suspected for puree, soft and bite-sized, regular solids, moderately thick liquids and mildly thick liquids characterized by initiation of the pharyngeal swallow and hyolaryngeal excursion upon digital palpation with no overt s/s penetration/ aspiration noted. 3. Moderate pharyngeal stage suspected for thin liquids characterized by initiation of the pharyngeal swallow and hyolaryngeal excursion upon digital palpation with wet vocal quality resuming, throat clearing and eventual cough response noted.

## 2023-08-26 NOTE — SWALLOW BEDSIDE ASSESSMENT ADULT - ASR SWALLOW RECOMMEND DIAG
Objective testing Not warranted at this time given overt s/s penetration/ aspiration noted with thin liquids only and clear lungs indicated on recent chest imaging

## 2023-08-26 NOTE — PROGRESS NOTE ADULT - SUBJECTIVE AND OBJECTIVE BOX
Patient is a 89y old  Male who presents with a chief complaint of Syncope (26 Aug 2023 12:24)      SUBJECTIVE / OVERNIGHT EVENTS:    No events overnight. This AM, patient without n/v/d/cp/sob.  Still having a lot of pain in R hand.    MEDICATIONS  (STANDING):  allopurinol 100 milliGRAM(s) Oral daily  cholecalciferol 2000 Unit(s) Oral daily  heparin   Injectable 5000 Unit(s) SubCutaneous every 12 hours  melatonin 3 milliGRAM(s) Oral at bedtime  multivitamin 1 Tablet(s) Oral daily  naproxen 500 milliGRAM(s) Oral two times a day  polyethylene glycol 3350 17 Gram(s) Oral daily  senna 2 Tablet(s) Oral at bedtime  tamsulosin 0.4 milliGRAM(s) Oral at bedtime  valACYclovir 500 milliGRAM(s) Oral two times a day    MEDICATIONS  (PRN):  acetaminophen     Tablet .. 650 milliGRAM(s) Oral every 6 hours PRN Mild Pain (1 - 3), Moderate Pain (4 - 6)  ondansetron    Tablet 8 milliGRAM(s) Oral every 8 hours PRN for indigestion  traMADol 25 milliGRAM(s) Oral every 8 hours PRN moderate to severe pain      PHYSICAL EXAM:  T(C): 36.6 (08-26-23 @ 09:30), Max: 37.8 (08-25-23 @ 17:17)  HR: 70 (08-26-23 @ 09:30) (62 - 72)  BP: 137/57 (08-26-23 @ 09:30) (126/64 - 149/63)  RR: 18 (08-26-23 @ 09:30) (18 - 18)  SpO2: 96% (08-26-23 @ 09:30) (96% - 100%)  I&O's Summary    GENERAL: NAD, lying in bed  HEAD:  Atraumatic, Normocephalic, MMM  CHEST/LUNG: No use of accessory muscles, CTAB, breathing non-labored  COR: RR, no mrcg  ABD: Soft, ND/NT, +BS  PSYCH: AAOx3  NEUROLOGY: CN II-XII grossly intact, moving all extremities  SKIN: No rashes or lesions  EXT: wwp, no cce, R hand swelling/erythema    LABS:  CAPILLARY BLOOD GLUCOSE                              9.5    4.74  )-----------( 229      ( 26 Aug 2023 04:47 )             28.9     08-26    136  |  105  |  16  ----------------------------<  118<H>  3.7   |  18<L>  |  0.97    Ca    7.5<L>      26 Aug 2023 04:47  Phos  2.3     08-26  Mg     1.70     08-26            Urinalysis Basic - ( 26 Aug 2023 04:47 )    Color: x / Appearance: x / SG: x / pH: x  Gluc: 118 mg/dL / Ketone: x  / Bili: x / Urobili: x   Blood: x / Protein: x / Nitrite: x   Leuk Esterase: x / RBC: x / WBC x   Sq Epi: x / Non Sq Epi: x / Bacteria: x          RADIOLOGY & ADDITIONAL TESTS:    Telemetry Personally Reviewed -     Imaging Personally Reviewed -     Imaging Reviewed -     Consultant(s) Notes Reviewed -       Care Discussed with Consultants/Other Providers -  Patient is a 89y old  Male who presents with a chief complaint of Syncope (26 Aug 2023 12:24)      SUBJECTIVE / OVERNIGHT EVENTS:    No events overnight. This AM, patient without n/v/d/cp/sob.  Still having a lot of pain in R hand.    MEDICATIONS  (STANDING):  allopurinol 100 milliGRAM(s) Oral daily  cholecalciferol 2000 Unit(s) Oral daily  heparin   Injectable 5000 Unit(s) SubCutaneous every 12 hours  melatonin 3 milliGRAM(s) Oral at bedtime  multivitamin 1 Tablet(s) Oral daily  naproxen 500 milliGRAM(s) Oral two times a day  polyethylene glycol 3350 17 Gram(s) Oral daily  senna 2 Tablet(s) Oral at bedtime  tamsulosin 0.4 milliGRAM(s) Oral at bedtime  valACYclovir 500 milliGRAM(s) Oral two times a day    MEDICATIONS  (PRN):  acetaminophen     Tablet .. 650 milliGRAM(s) Oral every 6 hours PRN Mild Pain (1 - 3), Moderate Pain (4 - 6)  ondansetron    Tablet 8 milliGRAM(s) Oral every 8 hours PRN for indigestion  traMADol 25 milliGRAM(s) Oral every 8 hours PRN moderate to severe pain      PHYSICAL EXAM:  T(C): 36.6 (08-26-23 @ 09:30), Max: 37.8 (08-25-23 @ 17:17)  HR: 70 (08-26-23 @ 09:30) (62 - 72)  BP: 137/57 (08-26-23 @ 09:30) (126/64 - 149/63)  RR: 18 (08-26-23 @ 09:30) (18 - 18)  SpO2: 96% (08-26-23 @ 09:30) (96% - 100%)  I&O's Summary    GENERAL: NAD, lying in bed  HEAD:  Atraumatic, Normocephalic, MMM  CHEST/LUNG: No use of accessory muscles, CTAB, breathing non-labored  COR: RR, no mrcg  ABD: Soft, ND/NT, +BS  PSYCH: AAOx3  NEUROLOGY: CN II-XII grossly intact, moving all extremities  SKIN: No rashes or lesions  EXT: wwp, no cce, R hand swelling/erythema    LABS:  CAPILLARY BLOOD GLUCOSE                              9.5    4.74  )-----------( 229      ( 26 Aug 2023 04:47 )             28.9     08-26    136  |  105  |  16  ----------------------------<  118<H>  3.7   |  18<L>  |  0.97    Ca    7.5<L>      26 Aug 2023 04:47  Phos  2.3     08-26  Mg     1.70     08-26            Urinalysis Basic - ( 26 Aug 2023 04:47 )    Color: x / Appearance: x / SG: x / pH: x  Gluc: 118 mg/dL / Ketone: x  / Bili: x / Urobili: x   Blood: x / Protein: x / Nitrite: x   Leuk Esterase: x / RBC: x / WBC x   Sq Epi: x / Non Sq Epi: x / Bacteria: x          RADIOLOGY & ADDITIONAL TESTS:    Telemetry Personally Reviewed - SR, 1st deg AV block    Imaging Personally Reviewed -     Imaging Reviewed -     Consultant(s) Notes Reviewed -       Care Discussed with Consultants/Other Providers -

## 2023-08-26 NOTE — CONSULT NOTE ADULT - ASSESSMENT
89 year old male, with past history significant for Multiple myeloma, Hypertension, Dyslipidemia, Macular degeneration, Hearing difficulty (bilateral), Forgetfulness and Gait difficulty (uses walker), presented to the ED, from the cancer center, secondary to loss of consciousness found to be hypotensive after several bowel movements now with hand pain    ##acute monoarthritis  crystalline vs paraneoplastic vs inflammatory vs OA    Crystalline  -history of gout. last uric acid 2.8 this hospitalization  -could be falsely low due to acute flare, however patient has been on long standing allopurinol    Paraneoplastic  -patient has active multiple myeloma and is undergoing treatment  -was scheduled for (5th dose) C3D1 of meg/dex on 8/21/23 but transferred to [facility] for hypotension, syncope, and fatigue  last dose Daratumumab 7/24      Inflammatory  ?PsO on the hand  -per xray read possible changes consistent with Psoriatic arthritis  -?pencil in cup deformities and symmetric JSN  -however patient is already on a B-cell depleting therapy which is immunosuppressive in addition to weekly dexamethasone as above    PLAN  -? gout  -if inflammatory arthropathy at this time the patient already is receiving immunosuppressive medication that would address this possible inflammatory arthropathy  -at present the patient's multiple myeloma treatment takes precedence over adding any other immunosuppressive agent solely designed to treat possible psoriatic arthritis manifestations  -serologies likely to be inaccurate in the setting of active malignancy and not likely to affect management  -re-starting chemotherapy as per hematology   -if erosive osteoarthritis (possible given the gull wing type deormities appreciated on x-ray) there is no specific treatment for this and would not give immunosuppression for this disease entity    **note is incomplete**    Cedric Mcbride MD, PGY-5  Rheumatology Fellow  Reachable on TEAMS 89 year old male, with past history significant for Multiple myeloma, Hypertension, Dyslipidemia, Macular degeneration, Hearing difficulty (bilateral), Forgetfulness and Gait difficulty (uses walker), presented to the ED, from the cancer center, secondary to loss of consciousness found to be hypotensive after several bowel movements now with hand pain    ##acute monoarthritis  crystalline vs paraneoplastic vs inflammatory vs OA    Crystalline  -history of gout. last uric acid 2.8 this hospitalization  -could be falsely low due to acute flare, however patient has been on long standing allopurinol  -possible patient has acute on chronic joint inflammation and presentation most c/w gout flare    Paraneoplastic  -patient has active multiple myeloma and is undergoing treatment  -was scheduled for (5th dose) C3D1 of meg/dex on 8/21/23 but transferred to [facility] for hypotension, syncope, and fatigue  last dose Daratumumab 7/24      Inflammatory  doubt psoriatic arthritis or rheumatoid arthritis on x-ray   lack of symmetry points away from RA  x-ray most c/w erosive OA  however given erythema and swelling maybe crystallien as above  -per xray read possible changes consistent with Psoriatic arthritis  -?pencil in cup deformities and symmetric JSN  -however patient is already on a B-cell depleting therapy which is immunosuppressive in addition to weekly dexamethasone as above    PLAN  -possible acute crystalline flare on top of erosive OA  -will order xray of contralateral hand to look for evidence of symmetrical autoimmune disease  -solumedrol 20mg IV qD for now, will monitor for response on Monday  -if inflammatory arthropathy at this time the patient already is receiving immunosuppressive medication that would address this possible inflammatory arthropathy  -at present the patient's multiple myeloma treatment takes precedence over adding any other immunosuppressive agent solely designed to treat possible psoriatic arthritis manifestations  -serologies likely to be inaccurate in the setting of active malignancy and not likely to affect management thus will defer  -re-starting chemotherapy as per hematology   -if erosive osteoarthritis (possible given the gull wing type deormities appreciated on x-ray) there is no specific treatment for this and would not give immunosuppression for this disease entity      Cedric Mcbride MD, PGY-5  Rheumatology Fellow  Reachable on TEAMS 89 year old male, with past history significant for Multiple myeloma, Hypertension, Dyslipidemia, Macular degeneration, Hearing difficulty (bilateral), Forgetfulness and Gait difficulty (uses walker), presented to the ED, from the cancer center, secondary to loss of consciousness found to be hypotensive after several bowel movements now with hand pain    ##acute monoarthritis  crystalline vs paraneoplastic vs inflammatory vs OA    Crystalline  -history of gout, on low dose allopurinol, last uric acid 2.8 this hospitalization  -could be falsely low due to acute flare, however patient has been on long standing allopurinol  -possible patient has acute on chronic joint inflammation and presentation most c/w a crystalline flare     Paraneoplastic  -patient has active multiple myeloma and is undergoing treatment  -was scheduled for (5th dose) C3D1 of meg/dex on 8/21/23 but transferred to [facility] for hypotension, syncope, and fatigue  last dose Daratumumab 7/24      Inflammatory  doubt psoriatic arthritis or rheumatoid arthritis based on x-ray, appears more so erosive OA   lack of symmetry also points away from RA  -however patient is already on a B-cell depleting therapy which is immunosuppressive in addition to weekly dexamethasone as above    PLAN  -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  -will order xray of contralateral hand to look for evidence of symmetrical autoimmune disease  -solumedrol 20mg IV qD for now, will monitor for response on Monday  -if inflammatory arthropathy at this time the patient already is receiving immunosuppressive medication that would address this possible inflammatory arthropathy  -at present the patient's multiple myeloma treatment takes precedence over adding any other immunosuppressive agent solely designed to treat possible psoriatic arthritis manifestations  -serologies likely to be inaccurate in the setting of active malignancy and not likely to affect management thus will defer  -re-starting chemotherapy as per hematology   -if erosive osteoarthritis (possible given the gull wing type deformities appreciated on x-ray) there is no specific treatment for this and would not give immunosuppression for this disease entity      Cedric Mcbride MD, PGY-5  Rheumatology Fellow  Reachable on TEAMS

## 2023-08-26 NOTE — SWALLOW BEDSIDE ASSESSMENT ADULT - COMMENTS
Per Hospitalist Note 8/25, "89 year old male, with past history significant for Multiple myeloma, Hypertension, Dyslipidemia, Macular degeneration, Hearing difficulty (bilateral), and Gait difficulty (uses walker), presented to the ED secondary to loss of consciousness.  Diagnosed with Syncope in the ED."    CXR 8/21: IMPRESSION: Clear lungs.    Patient received upright awake/ alert, consuming lunch tray, patient able to make basic wants/ needs known and able to follow simple directives. Patient noted with wet vocal quality upon receipt. Patient cued to cough which improved vocal quality.

## 2023-08-26 NOTE — SWALLOW BEDSIDE ASSESSMENT ADULT - ADDITIONAL RECOMMENDATIONS
1. This service to follow up for diet tolerance as schedule permits. 2. Medical team further advised to reconsult this service if patient is with a change in medical status or change in tolerance of recommended PO.

## 2023-08-26 NOTE — SWALLOW BEDSIDE ASSESSMENT ADULT - CONSISTENCIES ADMINISTERED
~ 3 oz/mildly thick 3 tsp trials/soft & bite-sized whole cracker/regular solid 2 tsp trials/moderately thick/pureed ~2 oz/thin liquid

## 2023-08-26 NOTE — CONSULT NOTE ADULT - ATTENDING COMMENTS
--------------------------------------------------------------------------------------------------------------  Patient seen on rounds with Hematology fellows Dr. Mijares and Dr. 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 Mcintyre at [facility] presented for treatment (on Helen/Rev/Dex), had been having some diarrhea and overall not feeling well, had syncopal episode in bathroom on 8/21/23 and was tranported over for hypotension with SBP in 70s.   - pacemaker interrogated and battery function  - no infectious source found  - clinically improved with better BP  - reported diarrhea which may be due to revlimid; infectious work-up ongoing  - no plan for inpatient chemotherapy at this time
Imaging most consistent with erosive OA, will get imaging of other hand to better assess. Mild inflammatory flare superimposed, will trial moderate dose steroids to see if improves. C/w current allopurinol dose.

## 2023-08-27 LAB
ANION GAP SERPL CALC-SCNC: 12 MMOL/L — SIGNIFICANT CHANGE UP (ref 7–14)
BUN SERPL-MCNC: 25 MG/DL — HIGH (ref 7–23)
CALCIUM SERPL-MCNC: 7.9 MG/DL — LOW (ref 8.4–10.5)
CHLORIDE SERPL-SCNC: 105 MMOL/L — SIGNIFICANT CHANGE UP (ref 98–107)
CO2 SERPL-SCNC: 20 MMOL/L — LOW (ref 22–31)
CREAT SERPL-MCNC: 0.86 MG/DL — SIGNIFICANT CHANGE UP (ref 0.5–1.3)
EGFR: 82 ML/MIN/1.73M2 — SIGNIFICANT CHANGE UP
GLUCOSE SERPL-MCNC: 193 MG/DL — HIGH (ref 70–99)
HCT VFR BLD CALC: 30.6 % — LOW (ref 39–50)
HGB BLD-MCNC: 10.5 G/DL — LOW (ref 13–17)
MAGNESIUM SERPL-MCNC: 1.9 MG/DL — SIGNIFICANT CHANGE UP (ref 1.6–2.6)
MCHC RBC-ENTMCNC: 32.3 PG — SIGNIFICANT CHANGE UP (ref 27–34)
MCHC RBC-ENTMCNC: 34.3 GM/DL — SIGNIFICANT CHANGE UP (ref 32–36)
MCV RBC AUTO: 94.2 FL — SIGNIFICANT CHANGE UP (ref 80–100)
NRBC # BLD: 0 /100 WBCS — SIGNIFICANT CHANGE UP (ref 0–0)
NRBC # FLD: 0 K/UL — SIGNIFICANT CHANGE UP (ref 0–0)
PHOSPHATE SERPL-MCNC: 2.6 MG/DL — SIGNIFICANT CHANGE UP (ref 2.5–4.5)
PLATELET # BLD AUTO: 293 K/UL — SIGNIFICANT CHANGE UP (ref 150–400)
POTASSIUM SERPL-MCNC: 4.3 MMOL/L — SIGNIFICANT CHANGE UP (ref 3.5–5.3)
POTASSIUM SERPL-SCNC: 4.3 MMOL/L — SIGNIFICANT CHANGE UP (ref 3.5–5.3)
RBC # BLD: 3.25 M/UL — LOW (ref 4.2–5.8)
RBC # FLD: 14 % — SIGNIFICANT CHANGE UP (ref 10.3–14.5)
SODIUM SERPL-SCNC: 137 MMOL/L — SIGNIFICANT CHANGE UP (ref 135–145)
WBC # BLD: 4.58 K/UL — SIGNIFICANT CHANGE UP (ref 3.8–10.5)
WBC # FLD AUTO: 4.58 K/UL — SIGNIFICANT CHANGE UP (ref 3.8–10.5)

## 2023-08-27 PROCEDURE — 99232 SBSQ HOSP IP/OBS MODERATE 35: CPT

## 2023-08-27 RX ADMIN — Medication 2000 UNIT(S): at 12:18

## 2023-08-27 RX ADMIN — Medication 500 MILLIGRAM(S): at 06:10

## 2023-08-27 RX ADMIN — Medication 1 TABLET(S): at 12:18

## 2023-08-27 RX ADMIN — HEPARIN SODIUM 5000 UNIT(S): 5000 INJECTION INTRAVENOUS; SUBCUTANEOUS at 05:46

## 2023-08-27 RX ADMIN — VALACYCLOVIR 500 MILLIGRAM(S): 500 TABLET, FILM COATED ORAL at 17:04

## 2023-08-27 RX ADMIN — TAMSULOSIN HYDROCHLORIDE 0.4 MILLIGRAM(S): 0.4 CAPSULE ORAL at 21:13

## 2023-08-27 RX ADMIN — Medication 500 MILLIGRAM(S): at 05:46

## 2023-08-27 RX ADMIN — VALACYCLOVIR 500 MILLIGRAM(S): 500 TABLET, FILM COATED ORAL at 05:46

## 2023-08-27 RX ADMIN — SENNA PLUS 2 TABLET(S): 8.6 TABLET ORAL at 21:20

## 2023-08-27 RX ADMIN — Medication 3 MILLIGRAM(S): at 21:12

## 2023-08-27 RX ADMIN — POLYETHYLENE GLYCOL 3350 17 GRAM(S): 17 POWDER, FOR SOLUTION ORAL at 12:18

## 2023-08-27 RX ADMIN — HEPARIN SODIUM 5000 UNIT(S): 5000 INJECTION INTRAVENOUS; SUBCUTANEOUS at 17:05

## 2023-08-27 RX ADMIN — Medication 100 MILLIGRAM(S): at 12:18

## 2023-08-27 NOTE — PROGRESS NOTE ADULT - SUBJECTIVE AND OBJECTIVE BOX
Patient is a 89y old  Male who presents with a chief complaint of Syncope (26 Aug 2023 13:21)      SUBJECTIVE / OVERNIGHT EVENTS:    No events overnight. This AM, patient without n/v/d/cp/sob. Hand pain improving per patient.     MEDICATIONS  (STANDING):  allopurinol 100 milliGRAM(s) Oral daily  cholecalciferol 2000 Unit(s) Oral daily  heparin   Injectable 5000 Unit(s) SubCutaneous every 12 hours  melatonin 3 milliGRAM(s) Oral at bedtime  methylPREDNISolone sodium succinate Injectable 20 milliGRAM(s) IV Push daily  multivitamin 1 Tablet(s) Oral daily  polyethylene glycol 3350 17 Gram(s) Oral daily  senna 2 Tablet(s) Oral at bedtime  tamsulosin 0.4 milliGRAM(s) Oral at bedtime  valACYclovir 500 milliGRAM(s) Oral two times a day    MEDICATIONS  (PRN):  acetaminophen     Tablet .. 650 milliGRAM(s) Oral every 6 hours PRN Mild Pain (1 - 3), Moderate Pain (4 - 6)  ondansetron    Tablet 8 milliGRAM(s) Oral every 8 hours PRN for indigestion  traMADol 25 milliGRAM(s) Oral every 8 hours PRN moderate to severe pain      PHYSICAL EXAM:  T(C): 36.6 (08-27-23 @ 09:15), Max: 36.9 (08-26-23 @ 22:15)  HR: 60 (08-27-23 @ 09:15) (58 - 60)  BP: 114/63 (08-27-23 @ 09:15) (114/63 - 141/71)  RR: 18 (08-27-23 @ 09:15) (18 - 19)  SpO2: 97% (08-27-23 @ 09:15) (97% - 100%)  I&O's Summary    GENERAL: NAD, lying in bed  HEAD:  Atraumatic, Normocephalic, MMM  CHEST/LUNG: No use of accessory muscles, CTAB, breathing non-labored  COR: RR, no mrcg  ABD: Soft, ND/NT, +BS  PSYCH: AAOx3  NEUROLOGY: CN II-XII grossly intact, moving all extremities  SKIN: R finger erythema  EXT: wwp, no cce; R wrist swelling warmth, some finger erythema    LABS:  CAPILLARY BLOOD GLUCOSE                              10.5   4.58  )-----------( 293      ( 27 Aug 2023 04:54 )             30.6     08-27    137  |  105  |  25<H>  ----------------------------<  193<H>  4.3   |  20<L>  |  0.86    Ca    7.9<L>      27 Aug 2023 04:54  Phos  2.6     08-27  Mg     1.90     08-27            Urinalysis Basic - ( 27 Aug 2023 04:54 )    Color: x / Appearance: x / SG: x / pH: x  Gluc: 193 mg/dL / Ketone: x  / Bili: x / Urobili: x   Blood: x / Protein: x / Nitrite: x   Leuk Esterase: x / RBC: x / WBC x   Sq Epi: x / Non Sq Epi: x / Bacteria: x          RADIOLOGY & ADDITIONAL TESTS:    Telemetry Personally Reviewed -     Imaging Personally Reviewed -     Imaging Reviewed -     Consultant(s) Notes Reviewed -   rheum    Care Discussed with Consultants/Other Providers -

## 2023-08-28 LAB
ANION GAP SERPL CALC-SCNC: 11 MMOL/L — SIGNIFICANT CHANGE UP (ref 7–14)
BUN SERPL-MCNC: 44 MG/DL — HIGH (ref 7–23)
CALCIUM SERPL-MCNC: 8.3 MG/DL — LOW (ref 8.4–10.5)
CHLORIDE SERPL-SCNC: 107 MMOL/L — SIGNIFICANT CHANGE UP (ref 98–107)
CO2 SERPL-SCNC: 22 MMOL/L — SIGNIFICANT CHANGE UP (ref 22–31)
CREAT SERPL-MCNC: 1 MG/DL — SIGNIFICANT CHANGE UP (ref 0.5–1.3)
EGFR: 71 ML/MIN/1.73M2 — SIGNIFICANT CHANGE UP
GLUCOSE SERPL-MCNC: 97 MG/DL — SIGNIFICANT CHANGE UP (ref 70–99)
HCT VFR BLD CALC: 28 % — LOW (ref 39–50)
HGB BLD-MCNC: 9.4 G/DL — LOW (ref 13–17)
MAGNESIUM SERPL-MCNC: 2 MG/DL — SIGNIFICANT CHANGE UP (ref 1.6–2.6)
MCHC RBC-ENTMCNC: 31.9 PG — SIGNIFICANT CHANGE UP (ref 27–34)
MCHC RBC-ENTMCNC: 33.6 GM/DL — SIGNIFICANT CHANGE UP (ref 32–36)
MCV RBC AUTO: 94.9 FL — SIGNIFICANT CHANGE UP (ref 80–100)
NRBC # BLD: 0 /100 WBCS — SIGNIFICANT CHANGE UP (ref 0–0)
NRBC # FLD: 0 K/UL — SIGNIFICANT CHANGE UP (ref 0–0)
PHOSPHATE SERPL-MCNC: 2.4 MG/DL — LOW (ref 2.5–4.5)
PLATELET # BLD AUTO: 357 K/UL — SIGNIFICANT CHANGE UP (ref 150–400)
POTASSIUM SERPL-MCNC: 4.3 MMOL/L — SIGNIFICANT CHANGE UP (ref 3.5–5.3)
POTASSIUM SERPL-SCNC: 4.3 MMOL/L — SIGNIFICANT CHANGE UP (ref 3.5–5.3)
RBC # BLD: 2.95 M/UL — LOW (ref 4.2–5.8)
RBC # FLD: 14.1 % — SIGNIFICANT CHANGE UP (ref 10.3–14.5)
SODIUM SERPL-SCNC: 140 MMOL/L — SIGNIFICANT CHANGE UP (ref 135–145)
WBC # BLD: 4.14 K/UL — SIGNIFICANT CHANGE UP (ref 3.8–10.5)
WBC # FLD AUTO: 4.14 K/UL — SIGNIFICANT CHANGE UP (ref 3.8–10.5)

## 2023-08-28 PROCEDURE — 99233 SBSQ HOSP IP/OBS HIGH 50: CPT

## 2023-08-28 PROCEDURE — 73130 X-RAY EXAM OF HAND: CPT | Mod: 26,LT

## 2023-08-28 PROCEDURE — 99232 SBSQ HOSP IP/OBS MODERATE 35: CPT | Mod: GC

## 2023-08-28 RX ORDER — SODIUM,POTASSIUM PHOSPHATES 278-250MG
1 POWDER IN PACKET (EA) ORAL ONCE
Refills: 0 | Status: COMPLETED | OUTPATIENT
Start: 2023-08-28 | End: 2023-08-28

## 2023-08-28 RX ADMIN — Medication 2000 UNIT(S): at 11:24

## 2023-08-28 RX ADMIN — Medication 20 MILLIGRAM(S): at 05:48

## 2023-08-28 RX ADMIN — TAMSULOSIN HYDROCHLORIDE 0.4 MILLIGRAM(S): 0.4 CAPSULE ORAL at 21:42

## 2023-08-28 RX ADMIN — VALACYCLOVIR 500 MILLIGRAM(S): 500 TABLET, FILM COATED ORAL at 05:53

## 2023-08-28 RX ADMIN — Medication 100 MILLIGRAM(S): at 11:22

## 2023-08-28 RX ADMIN — HEPARIN SODIUM 5000 UNIT(S): 5000 INJECTION INTRAVENOUS; SUBCUTANEOUS at 05:48

## 2023-08-28 RX ADMIN — VALACYCLOVIR 500 MILLIGRAM(S): 500 TABLET, FILM COATED ORAL at 17:22

## 2023-08-28 RX ADMIN — Medication 3 MILLIGRAM(S): at 21:42

## 2023-08-28 RX ADMIN — SENNA PLUS 2 TABLET(S): 8.6 TABLET ORAL at 21:42

## 2023-08-28 RX ADMIN — Medication 1 TABLET(S): at 11:22

## 2023-08-28 RX ADMIN — Medication 1 PACKET(S): at 17:22

## 2023-08-28 RX ADMIN — HEPARIN SODIUM 5000 UNIT(S): 5000 INJECTION INTRAVENOUS; SUBCUTANEOUS at 17:22

## 2023-08-28 NOTE — PROGRESS NOTE ADULT - SUBJECTIVE AND OBJECTIVE BOX
[facility] Division of [facility]  Tiffany Patel MD  Pager [phone]      Patient is a 89y old  Male who presents with a chief complaint of Syncope (28 Aug 2023 14:24)      SUBJECTIVE / OVERNIGHT EVENTS:    no acute event o/n. reports R hand swelling and pain much improved since yesterday. no new complaints     ADDITIONAL REVIEW OF SYSTEMS:    RESPIRATORY: No cough, wheezing, chills or hemoptysis; No shortness of breath  CARDIOVASCULAR: No chest pain, palpitations, dizziness, or leg swelling  GASTROINTESTINAL: No abdominal or epigastric pain. No nausea, vomiting, or hematemesis; No diarrhea or constipation. No melena or hematochezia.      MEDICATIONS  (STANDING):  allopurinol 100 milliGRAM(s) Oral daily  cholecalciferol 2000 Unit(s) Oral daily  heparin   Injectable 5000 Unit(s) SubCutaneous every 12 hours  melatonin 3 milliGRAM(s) Oral at bedtime  methylPREDNISolone sodium succinate Injectable 20 milliGRAM(s) IV Push daily  multivitamin 1 Tablet(s) Oral daily  polyethylene glycol 3350 17 Gram(s) Oral daily  potassium phosphate / sodium phosphate Powder (PHOS-NaK) 1 Packet(s) Oral once  senna 2 Tablet(s) Oral at bedtime  tamsulosin 0.4 milliGRAM(s) Oral at bedtime  valACYclovir 500 milliGRAM(s) Oral two times a day    MEDICATIONS  (PRN):  acetaminophen     Tablet .. 650 milliGRAM(s) Oral every 6 hours PRN Mild Pain (1 - 3), Moderate Pain (4 - 6)  ondansetron    Tablet 8 milliGRAM(s) Oral every 8 hours PRN for indigestion  traMADol 25 milliGRAM(s) Oral every 8 hours PRN moderate to severe pain      CAPILLARY BLOOD GLUCOSE        I&O's Summary      PHYSICAL EXAM:  Vital Signs Last 24 Hrs  T(C): 36.3 (28 Aug 2023 10:20), Max: 36.4 (27 Aug 2023 17:30)  T(F): 97.4 (28 Aug 2023 10:20), Max: 97.6 (27 Aug 2023 17:30)  HR: 60 (28 Aug 2023 10:20) (59 - 62)  BP: 125/57 (28 Aug 2023 10:20) (119/64 - 143/76)  BP(mean): --  RR: 18 (28 Aug 2023 10:20) (18 - 19)  SpO2: 98% (28 Aug 2023 10:20) (96% - 98%)    Parameters below as of 28 Aug 2023 10:20  Patient On (Oxygen Delivery Method): room air        CONSTITUTIONAL: NAD,  EYES: PERRLA; conjunctiva and sclera clear  ENMT: Moist oral mucosa, no pharyngeal injection or exudates;   NECK: Supple, no palpable masses;  RESPIRATORY: Normal respiratory effort; lungs are clear to auscultation bilaterally  CARDIOVASCULAR: Regular rate and rhythm, normal S1 and S2, no murmur/rub/gallop; No lower extremity edema; Peripheral pulses are 2+ bilaterally  ABDOMEN: Nontender to palpation, normoactive bowel sounds, no rebound/guarding;   MUSCLOSKELETAL:  R hand +swelling   PSYCH: A+O to person, place  NEUROLOGY: CN 2-12 are intact and symmetric; no gross sensory deficits;   SKIN: No rashes;     LABS:                        9.4    4.14  )-----------( 357      ( 28 Aug 2023 05:50 )             28.0     08-28    140  |  107  |  44<H>  ----------------------------<  97  4.3   |  22  |  1.00    Ca    8.3<L>      28 Aug 2023 05:50  Phos  2.4     08-28  Mg     2.00     08-28            Urinalysis Basic - ( 28 Aug 2023 05:50 )    Color: x / Appearance: x / SG: x / pH: x  Gluc: 97 mg/dL / Ketone: x  / Bili: x / Urobili: x   Blood: x / Protein: x / Nitrite: x   Leuk Esterase: x / RBC: x / WBC x   Sq Epi: x / Non Sq Epi: x / Bacteria: x          RADIOLOGY & ADDITIONAL TESTS:  Results Reviewed:   Imaging Personally Reviewed:  Electrocardiogram Personally Reviewed:    COORDINATION OF CARE:  Care Discussed with Consultants/Other Providers [Y/N]:  Prior or Outpatient Records Reviewed [Y/N]:

## 2023-08-28 NOTE — PROGRESS NOTE ADULT - PROBLEM SELECTOR PROBLEM 8
Hypotension
Discharge planning issues
Hypotension
Discharge planning issues
Hypotension

## 2023-08-28 NOTE — PROGRESS NOTE ADULT - PROBLEM SELECTOR PROBLEM 4
Hypophosphatemia

## 2023-08-28 NOTE — PROGRESS NOTE ADULT - ASSESSMENT
89 year old male, with past history significant for Multiple myeloma, Hypertension, Dyslipidemia, Macular degeneration, Hearing difficulty (bilateral), Forgetfulness and Gait difficulty (uses walker), presented to the ED, from the cancer center, secondary to loss of consciousness found to be hypotensive after several bowel movements now with hand pain    ##acute monoarthritis  crystalline vs paraneoplastic vs inflammatory vs OA    Crystalline  -history of gout, on low dose allopurinol, last uric acid 2.8 this hospitalization  -could be falsely low due to acute flare, however patient has been on long standing allopurinol  -possible patient has acute on chronic joint inflammation and presentation most c/w a crystalline flare     Paraneoplastic  -patient has active multiple myeloma and is undergoing treatment  -was scheduled for (5th dose) C3D1 of meg/dex on 8/21/23 but transferred to [facility] for hypotension, syncope, and fatigue  last dose Daratumumab 7/24      Inflammatory  doubt psoriatic arthritis or rheumatoid arthritis based on x-ray, appears more so erosive OA   lack of symmetry also points away from RA  -however patient is already on a B-cell depleting therapy which is immunosuppressive in addition to weekly dexamethasone as above    PLAN  -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  -will order xray of contralateral hand to look for evidence of symmetrical autoimmune disease  -solumedrol 20mg IV qD (8/26 - )  -if inflammatory arthropathy at this time the patient already is receiving immunosuppressive medication that would address this possible inflammatory arthropathy  -at present the patient's multiple myeloma treatment takes precedence over adding any other immunosuppressive agent solely designed to treat possible psoriatic arthritis manifestations  -serologies likely to be inaccurate in the setting of active malignancy and not likely to affect management thus will defer  -re-starting chemotherapy as per hematology   -if erosive osteoarthritis (possible given the gull wing type deformities appreciated on x-ray) there is no specific treatment for this and would not give immunosuppression for this disease entity    Cedric Mcbride MD, PGY-5  Rheumatology Fellow  Reachable on TEAMS 89 year old male, with past history significant for Multiple myeloma, Hypertension, Dyslipidemia, Macular degeneration, Hearing difficulty (bilateral), Forgetfulness and Gait difficulty (uses walker), presented to the ED, from the cancer center, secondary to loss of consciousness found to be hypotensive after several bowel movements now with hand pain improved after methylprednisone 20mg IV      PLAN  -favor 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 Pranav Mcbride MD, PGY-5  Rheumatology Fellow  Reachable on TEAMS

## 2023-08-28 NOTE — PROGRESS NOTE ADULT - SUBJECTIVE AND OBJECTIVE BOX
Cedric Mcbride MD, PGY-5  Rheumatology Fellow  Reachable on TEAMS    TRISTAN MCGRATH  [id number]    HISTORY OF PRESENT ILLNESS:    INTERVAL EVENTS        MEDICATIONS  (STANDING):  allopurinol 100 milliGRAM(s) Oral daily  cholecalciferol 2000 Unit(s) Oral daily  heparin   Injectable 5000 Unit(s) SubCutaneous every 12 hours  melatonin 3 milliGRAM(s) Oral at bedtime  methylPREDNISolone sodium succinate Injectable 20 milliGRAM(s) IV Push daily  multivitamin 1 Tablet(s) Oral daily  polyethylene glycol 3350 17 Gram(s) Oral daily  senna 2 Tablet(s) Oral at bedtime  tamsulosin 0.4 milliGRAM(s) Oral at bedtime  valACYclovir 500 milliGRAM(s) Oral two times a day    MEDICATIONS  (PRN):  acetaminophen     Tablet .. 650 milliGRAM(s) Oral every 6 hours PRN Mild Pain (1 - 3), Moderate Pain (4 - 6)  ondansetron    Tablet 8 milliGRAM(s) Oral every 8 hours PRN for indigestion  traMADol 25 milliGRAM(s) Oral every 8 hours PRN moderate to severe pain        Vital Signs Last 24 Hrs  T(C): 36.3 (28 Aug 2023 10:20), Max: 36.4 (27 Aug 2023 17:30)  T(F): 97.4 (28 Aug 2023 10:20), Max: 97.6 (27 Aug 2023 17:30)  HR: 60 (28 Aug 2023 10:20) (59 - 62)  BP: 125/57 (28 Aug 2023 10:20) (119/64 - 143/76)  BP(mean): --  RR: 18 (28 Aug 2023 10:20) (18 - 19)  SpO2: 98% (28 Aug 2023 10:20) (96% - 98%)    Parameters below as of 28 Aug 2023 10:20  Patient On (Oxygen Delivery Method): room air      Physical Exam:  General: No apparent distress  HEENT: EOMI, MMM. no oral ulcers  CVS: +S1/S2, RRR,   Resp: CTA b/l. No crackles/wheezing  MSK: R wrist with slight swelling and warmth but no erythema. + erythema and synovitis noted over 2nd/3rd MCPs as well as scattered PIPs and DIPs. bony nodules appreciated over scattered DIPs. no swelling/warmth/erythema of the joints of the UE/LE  Neuro: AAOx3  Skin: no tophi scaly rash on RUE, not c/w Psoriasis. more c/w actinic keratosis      LABS:  cret                        9.4    4.14  )-----------( 357      ( 28 Aug 2023 05:50 )             28.0     08-28    140  |  107  |  44<H>  ----------------------------<  97  4.3   |  22  |  1.00    Ca    8.3<L>      28 Aug 2023 05:50  Phos  2.4     08-28  Mg     2.00     08-28        Uric Acid: 2.8 mg/dL (08.23.23 @ 05:40)      Urinalysis Basic - ( 26 Aug 2023 04:47 )    Color: x / Appearance: x / SG: x / pH: x  Gluc: 118 mg/dL / Ketone: x  / Bili: x / Urobili: x   Blood: x / Protein: x / Nitrite: x   Leuk Esterase: x / RBC: x / WBC x   Sq Epi: x / Non Sq Epi: x / Bacteria: x    Rheumatology Work Up      RADIOLOGY & ADDITIONAL STUDIES:  < from: Xray Hand 3 Views, Right (08.23.23 @ 14:11) >    ACC: [id number] EXAM:  XR HAND MIN 3 VIEWS RT   ORDERED BY: LB KEENE     PROCEDURE DATE:  08/23/2023          INTERPRETATION:  CLINICAL INDICATION: Right hand pain and swelling.    TECHNIQUE: 3 views right hand.    COMPARISON: None.    FINDINGS:  The bones are demineralized. There is extensive joint space narrowing   with central subchondral erosions and marginal osteophyte formation at   the PIP and DIP joints across the fingers. Findings are suggestive of an   underlying inflammatory arthropathy such as erosive osteoarthritis or   psoriatic arthritis. No acute fracture or dislocation. 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 Chondrocalcinosis involving the   TFCC.      IMPRESSION:  Extensive joint space narrowing withcentral subchondral erosions and   marginal osteophyte formation at the PIP and DIP joints across the   fingers. Findings are suggestive of an underlying inflammatory   arthropathy such as erosive osteoarthritis or psoriatic arthritis.    --- End of Report ---    < end of copied text >     Cedric Mcbride MD, PGY-5  Rheumatology Fellow  Reachable on TEAMS    TRISTAN MCGRATH  [id number]    INTERVAL EVENTS  swelling or R hand significantly improved. gathered further collateral from health aide at bedside. Patient developed swelling of the R hand while in the hospital (contrary to story told by patient earlier in the course). hand is now close to baseline      MEDICATIONS  (STANDING):  allopurinol 100 milliGRAM(s) Oral daily  cholecalciferol 2000 Unit(s) Oral daily  heparin   Injectable 5000 Unit(s) SubCutaneous every 12 hours  melatonin 3 milliGRAM(s) Oral at bedtime  methylPREDNISolone sodium succinate Injectable 20 milliGRAM(s) IV Push daily  multivitamin 1 Tablet(s) Oral daily  polyethylene glycol 3350 17 Gram(s) Oral daily  senna 2 Tablet(s) Oral at bedtime  tamsulosin 0.4 milliGRAM(s) Oral at bedtime  valACYclovir 500 milliGRAM(s) Oral two times a day    MEDICATIONS  (PRN):  acetaminophen     Tablet .. 650 milliGRAM(s) Oral every 6 hours PRN Mild Pain (1 - 3), Moderate Pain (4 - 6)  ondansetron    Tablet 8 milliGRAM(s) Oral every 8 hours PRN for indigestion  traMADol 25 milliGRAM(s) Oral every 8 hours PRN moderate to severe pain        Vital Signs Last 24 Hrs  T(C): 36.3 (28 Aug 2023 10:20), Max: 36.4 (27 Aug 2023 17:30)  T(F): 97.4 (28 Aug 2023 10:20), Max: 97.6 (27 Aug 2023 17:30)  HR: 60 (28 Aug 2023 10:20) (59 - 62)  BP: 125/57 (28 Aug 2023 10:20) (119/64 - 143/76)  BP(mean): --  RR: 18 (28 Aug 2023 10:20) (18 - 19)  SpO2: 98% (28 Aug 2023 10:20) (96% - 98%)    Parameters below as of 28 Aug 2023 10:20  Patient On (Oxygen Delivery Method): room air      Physical Exam:  General: No apparent distress  HEENT: EOMI, MMM. no oral ulcers  CVS: +S1/S2, RRR,   Resp: CTA b/l. No crackles/wheezing  MSK: decreased ROM of R  wrist compared to left. decrease in generalized swellign and erythema although fainy erytrhema still noted.  bony nodules appreciated over scattered DIPs. no swelling/warmth/erythema of the joints of the UE/LE  Neuro: AAOx3  Skin: no tophi scaly rash on RUE, not c/w Psoriasis. more c/w actinic keratosis      LABS:  cret                        9.4    4.14  )-----------( 357      ( 28 Aug 2023 05:50 )             28.0     08-28    140  |  107  |  44<H>  ----------------------------<  97  4.3   |  22  |  1.00    Ca    8.3<L>      28 Aug 2023 05:50  Phos  2.4     08-28  Mg     2.00     08-28        Uric Acid: 2.8 mg/dL (08.23.23 @ 05:40)      Urinalysis Basic - ( 26 Aug 2023 04:47 )    Color: x / Appearance: x / SG: x / pH: x  Gluc: 118 mg/dL / Ketone: x  / Bili: x / Urobili: x   Blood: x / Protein: x / Nitrite: x   Leuk Esterase: x / RBC: x / WBC x   Sq Epi: x / Non Sq Epi: x / Bacteria: x    Rheumatology Work Up      RADIOLOGY & ADDITIONAL STUDIES:  < from: Xray Hand 3 Views, Right (08.23.23 @ 14:11) >    ACC: [id number] EXAM:  XR HAND MIN 3 VIEWS RT   ORDERED BY: LB KEENE     PROCEDURE DATE:  08/23/2023          INTERPRETATION:  CLINICAL INDICATION: Right hand pain and swelling.    TECHNIQUE: 3 views right hand.    COMPARISON: None.    FINDINGS:  The bones are demineralized. There is extensive joint space narrowing   with central subchondral erosions and marginal osteophyte formation at   the PIP and DIP joints across the fingers. Findings are suggestive of an   underlying inflammatory arthropathy such as erosive osteoarthritis or   psoriatic arthritis. No acute fracture or dislocation. 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 Chondrocalcinosis involving the   TFCC.      IMPRESSION:  Extensive joint space narrowing withcentral subchondral erosions and   marginal osteophyte formation at the PIP and DIP joints across the   fingers. Findings are suggestive of an underlying inflammatory   arthropathy such as erosive osteoarthritis or psoriatic arthritis.    --- End of Report ---    < end of copied text >

## 2023-08-28 NOTE — PROGRESS NOTE ADULT - ASSESSMENT
89 year old male, with past history significant for Multiple myeloma, Hypertension, Dyslipidemia, Macular degeneration, Hearing difficulty (bilateral), and Gait difficulty (uses walker), p/w syncope. course c/b R hand swelling/pain

## 2023-08-28 NOTE — PROGRESS NOTE ADULT - PROBLEM SELECTOR PLAN 7
Cr 1.3 on presentation. Likely pre-renal in setting of diarrhea.   -S/p IVF   -Now resolved
- history of same and currently undergoing chemotherapy  - being followed by [facility]  - Oncology consulted, recs appreciated
Cr 1.3 on presentation. Likely pre-renal in setting of diarrhea.   -S/p IVF   -Now resolved
Cr 1.3 on presentation. Likely pre-renal in setting of diarrhea.   -S/p IVF   -Now resolved
- history of same and currently undergoing chemotherapy  - being followed by [facility]  - Oncology consulted, recs pending
Additional Deep Sutures: 5-0 Monocryl

## 2023-08-28 NOTE — PROGRESS NOTE ADULT - PROBLEM SELECTOR PROBLEM 5
BORIS (acute kidney injury)
Witnessed syncope
Witnessed syncope
BORIS (acute kidney injury)
Witnessed syncope

## 2023-08-28 NOTE — PROGRESS NOTE ADULT - PROBLEM SELECTOR PROBLEM 1
Witnessed syncope
Swelling of right hand
Witnessed syncope
Swelling of right hand

## 2023-08-28 NOTE — PROGRESS NOTE ADULT - PROBLEM SELECTOR PLAN 9
- history of same and currently undergoing chemotherapy  - being followed by [facility]  - Oncology consulted, recs appreciated
DVT ppx: SQH  Diet: DASH  Dispo: Return to [facility] with home PT
- Lovenox
- history of same and currently undergoing chemotherapy  - being followed by [facility]  - Oncology consulted, recs appreciated

## 2023-08-28 NOTE — PROGRESS NOTE ADULT - ATTENDING COMMENTS
Patient seen and examined at bedside. Agree with assessment and plan as above.
89M with a PMH of IgG kappa multiple myeloma, HTN, HLD, BPH, narcolepsy, macular degeneration, who presented from [facility] due to syncope and hypotension at the treatment room. Hematology consulted for multiple myeloma management.    # Multiple Myeloma: Initially followed at [facility] for IgG kappa MGUS and treated with Helen-Vd. He transferred his care to my practice on 6/21/23 and was started on Helen-Rd (daratumumab + dexamethasone every 2 weeks for 8 doses) with PO Revlimid (days 21 out of 28-day cycles). His MM markers are improving. He was scheduled for C3D1 on 8/21/23 but was sent to [facility] for hypotension, syncope, and fatigue. His symptoms are now resolved, and he is planned for discharge home soon.  - Trend CBC with differential daily. Continue supportive transfusions as needed to maintain Hgb > 7 and plt > 10  (> 20 if febrile, > 50 if bleeding).   - Hold Revlimid until he comes to our office for his next treatment on 9/5/23. His caregiver at bedside is aware of this plan.  - Continue acyclovir for antiviral prophylaxis

## 2023-08-28 NOTE — PROGRESS NOTE ADULT - PROBLEM SELECTOR PROBLEM 9
Prophylactic measure
Prophylactic measure
Multiple myeloma

## 2023-08-28 NOTE — PROGRESS NOTE ADULT - PROBLEM SELECTOR PLAN 1
Swelling of R hand with pain and erythema since 8/23. History of gout but no effusion on imaging.   -XR with Extensive joint space narrowing with central subchondral erosions and marginal osteophyte formation at the PIP and DIP joints across the fingers. Findings are suggestive of an underlying inflammatory arthropathy   -rheum consulted - ddx includes crystalline vs inflammatory vs erosives OA  -started IV methylpred 20mg daily (8/26-), f/u rheum for further recs
Swelling of R hand with pain and erythema since 8/23. History of gout but no effusion on imaging.   -XR with Extensive joint space narrowing with central subchondral erosions and marginal osteophyte formation at the PIP and DIP joints across the fingers. Findings are suggestive of an underlying inflammatory arthropathy   -rheum consulted - ddx includes crystalline vs paraneoplastic vs inflammatory vs OA  -started IV methylpred 20mg daily, rheum to reassess Monday
Swelling of R hand with pain and erythema since 8/23. History of gout but no effusion on imaging.   -XR with Extensive joint space narrowing withcentral subchondral erosions and marginal osteophyte formation at the PIP and DIP joints across the fingers. Findings are suggestive of an underlying inflammatory arthropathy such as erosive osteoarthritis or psoriatic arthritis.  -Improving with ibuprofen (ordered ATC x 2d), continue with cold compresses q4H while awake  -outpatient rheum f/u
Swelling of R hand with pain and erythema since 8/23. History of gout but no effusion on imaging.   -XR with Extensive joint space narrowing withcentral subchondral erosions and marginal osteophyte formation at the PIP and DIP joints across the fingers. Findings are suggestive of an underlying inflammatory arthropathy such as erosive osteoarthritis or psoriatic arthritis.  -Improving with ibuprofen (ordered ATC x 2d), continue with cold compresses q4H while awake
Admitted from oncology office after went to the restroom and syncopized on toilet after having 2 episodes of loose BM's. Likely vasovagal +/- hypotension from dehydration 2/2 diarrhea. Diarrhea now resolved.   -Stable on telemetry   -S/p IVF with improvement in mental status   -PT rec [facility] but family prefers home PT
- likely due to orthostatic changes due to dehydration caused by diarrhea x past few days, inadequate oral hydration, electrolyte imbalances  - no gross signs of infection appreciated; RVP, COVID-19 PCR, UA CXR negative for signs of infection  - BP - 89/51 initially in the ED (reportedly 72/42 at the time of LOC)  - no head or body trauma  - CT scan of head for any acute findings.  ECG as noted above  - troponin = 28, pH = 7.29, pCO2 = 42, Lactate = 2.3, monocytosis of 20.1  - s/p one liter NaCl in ED and additional 1L over 10 hours given.   - f/u orthostatic vital signs (ordered)  - f/u TTE (ordered)  - f/u blood cultures x 2, urine culture (in progress)  - fall precautions, aspiration precautions, HOB elevation
Swelling of R hand with pain and erythema since 8/23. History of gout but no effusion on imaging.   -XR with Extensive joint space narrowing with central subchondral erosions and marginal osteophyte formation at the PIP and DIP joints across the fingers. Findings are suggestive of an underlying inflammatory arthropathy such as erosive osteoarthritis or psoriatic arthritis.  -still with significant pain, started naproxen BID; rheum consulted  -f/u rheum recs

## 2023-08-28 NOTE — PROGRESS NOTE ADULT - PROBLEM SELECTOR PLAN 4
- phosphorus = 2.3 (repeated at 1.9)  - also hypocalcemic at 7.7 (corrected value) and with PTH elevation at 404  - repleted w/ K-phos 15 mmol  - f/u for resolution  - f/u vitamin D level in the AM  - may need Endocrinology eval
- vitamin D level borderline normal   - C/w Vit D repletion   - outpatient follow up   - Continue to supplement lytes prn
- phosphorus = 2.3 (repeated at 1.9)  - also hypocalcemic at 7.7 (corrected value) and with PTH elevation at 404  - vitamin D level borderline normal   - outpatient follow up   - Continue to replete lytes prn
- phosphorus = 2.3 (repeated at 1.9)  - also hypocalcemic at 7.7 (corrected value) and with PTH elevation at 404  - vitamin D level borderline normal   - C/w Vit D repletion   - outpatient follow up   - Continue to replete lytes prn
- vitamin D level borderline normal   - C/w Vit D repletion   - outpatient follow up   - Continue to supplement lytes prn

## 2023-08-28 NOTE — PROGRESS NOTE ADULT - PROBLEM SELECTOR PLAN 5
Admitted from oncology office after went to the restroom and passed out on toilet after having 2 episodes of loose BM's. Likely vasovagal +/- hypotension from dehydration 2/2 diarrhea. Diarrhea now resolved.   -Stable on telemetry and without significant events, d/c tele   -S/p IVF with improvement in mental status   -PT rec [facility] but family prefers home PT
Admitted from oncology office after went to the restroom and passed out on toilet after having 2 episodes of loose BM's. Likely vasovagal +/- hypotension from dehydration 2/2 diarrhea. Diarrhea now resolved.   -Stable on telemetry and without significant events, d/c tele   -S/p IVF with improvement in mental status   -PT rec [facility] but family prefers home PT
Cr 1.3 on presentation. Likely pre-renal in setting of diarrhea.   -S/p IVF   -Now resolved
Admitted from oncology office after went to the restroom and syncopized on toilet after having 2 episodes of loose BM's. Likely vasovagal +/- hypotension from dehydration 2/2 diarrhea. Diarrhea now resolved.   -Stable on telemetry   -S/p IVF with improvement in mental status   -PT rec [facility] but family prefers home PT. CM requested PT re-eval 8/24.
Cr 1.3 on presentation. Likely pre-renal in setting of diarrhea.   -S/p IVF   -Now resolved
Admitted from oncology office after went to the restroom and passed out on toilet after having 2 episodes of loose BM's. Likely vasovagal +/- hypotension from dehydration 2/2 diarrhea. Diarrhea now resolved.   -Stable on telemetry and without significant events, d/c tele   -S/p IVF with improvement in mental status   -PT rec [facility] but family prefers home PT
Admitted from oncology office after went to the restroom and passed out on toilet after having 2 episodes of loose BM's. Likely vasovagal +/- hypotension from dehydration 2/2 diarrhea. Diarrhea now resolved.   -Stable on telemetry and without significant events, d/c tele   -S/p IVF with improvement in mental status   -PT rec [facility] but family prefers home PT

## 2023-08-28 NOTE — PROGRESS NOTE ADULT - PROBLEM SELECTOR PROBLEM 7
BORIS (acute kidney injury)
Multiple myeloma
Multiple myeloma
BORIS (acute kidney injury)

## 2023-08-28 NOTE — PROGRESS NOTE ADULT - NS ATTEST RISK PROBLEM GEN_ALL_CORE FT
Patient has multiple myeloma on active chemotherapy, which carries a risk for infection, bleeding, and other life-threatening complications.

## 2023-08-28 NOTE — PROGRESS NOTE ADULT - ASSESSMENT
89M with a PMH of IgG kappa multiple myeloma, HTN, HLD, BPH, ?narcolepsy, macular degeneration, hearing difficulty (bilateral), forgetfulness and gait difficulty (uses walker) who presented from [facility] due to syncope and hypotension at the treatment room. Hematology consulted for multiple myeloma management.    #Multiple Myeloma  - Initially followed at 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 BMBx 3/27/23 showed 87% plasma cells, meeting criteria for multiple myeloma  - Patient was started on Helen-Vd (daratumumab SC, Velcade 1 mg/m2 SC, dexamethasone 20 mg)  - Transferred his care to [facility] (Dr. Janessa Mcintyre) on 6/21/23  - Since with C1D1 on 6/26/23, he began Helen-Rd (daratumumab + dexamethasone every 2 weeks for 8 doses) with PO Revlimid (days 21 out of 28-day cycles)  - Was scheduled for (5th dose) C3D1 of helen/dex on 8/21/23 but transferred to [facility] for hypotension, syncope, and fatigue  - Suspect syncope was vasovagal, although diarrhea can well be a side effect of Revlimid  - Would check stool cx or GI PCR if patient is still having diarrhea  - Hold Revlimid inpatient but c/w acyclovir for PPX  - Blood cx and RVP negative  - Most recent labs from outpatient show stable K/L ratio of 1.66 and stable IgG level of 423. M-spike pending but was 0.3 on 7/24  - No plans for inpatient MM treatment. F/u with Dr. Mcintyre outpatient    Recommend:  - continue holding Revlimid at this time  - Will need close follow up with Dr. Mcintyre at [facility] after discharge for ongoing treatment    Thomas More, PGY-4  Fellow Hematology/Oncology  pager [phone]  Available on TEAMS  After 5pm or on weekends please contact  to page on-call fellow

## 2023-08-28 NOTE — PROGRESS NOTE ADULT - PROBLEM SELECTOR PLAN 3
- PTH = 404; secondary hyperparathyroidism  - vitamin D level borderline normal   - C/w Vit D repletion   - outpatient follow up   - Continue to supplement lytes prn
- calcium = 7.2, albumin = 3.4; calculated calcium = 7.7.  Ionized calcium = 1 (low)  - PTH = 404; secondary hyperparathyroidism  - vitamin D level borderline normal   - outpatient follow up   - Continue to replete lytes prn
- calcium = 7.2, albumin = 3.4; calculated calcium = 7.7.  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
- calcium = 7.2, albumin = 3.4; calculated calcium = 7.7.  Ionized calcium = 1 (low)  - PTH = 404; secondary hyperparathyroidism  - vitamin D level borderline normal   - C/w Vit D repletion   - outpatient follow up   - Continue to replete lytes prn
- PTH = 404; secondary hyperparathyroidism  - vitamin D level borderline normal   - C/w Vit D repletion   - outpatient follow up   - Continue to supplement lytes prn

## 2023-08-28 NOTE — PROGRESS NOTE ADULT - PROVIDER SPECIALTY LIST ADULT
Heme/Onc
Rheumatology
Hospitalist

## 2023-08-28 NOTE — PROGRESS NOTE ADULT - PROBLEM SELECTOR PLAN 6
- report of same, occurring over the week end and w/ witnessed syncope after diarrheal stool prior to coming to the ED  - unclear etiology, but could be affected by chemotherapy  - ensure optimal hydration + replete lytes PRN   - NOW RESOLVED
- BP = 89/51 in ED (was 72/42 at the time of LOC)  - appears to be due to dehydration (as related above)  - s/p IVF as above   - encourage oral hydration  - Infectious workup negaative   -Now resolved
- BP = 89/51 in ED (was 72/42 at the time of LOC)  - appears to be due to dehydration (as related above)  - s/p IVF as above   - encourage oral hydration  - f/u blood cultures, electrolytes  -Now resolved
- report of same, occurring over the week end and w/ witnessed syncope after diarrheal stool prior to coming to the ED  - unclear etiology, but could be affected by chemotherapy  - ensure optimal hydration + replete lytes PRN   - NOW RESOLVED

## 2023-08-28 NOTE — PROGRESS NOTE ADULT - PROBLEM SELECTOR PLAN 2
No clear cause of acute hypoxic RF  -CXR clear lungs, euvolemic on exam  -RVP negative  -weaned off O2 and stable on RA
Pt on 2-3L NC during this hospitalization.   -CXR clear lungs, euvolemic on exam  -advised RN to wean O2 and record saturation on RA  -RVP negative
- report of same, occurring over the week end and w/ witnessed syncope after diarrheal stool prior to coming to the ED  - unclear etiology, but could be affected by chemotherapy  - ensure optimal hydration + replete lytes PRN   - NOW RESOLVED
Pt on 2-3L NC during this hospitalization. Not sure why he's requiring it. Lungs sound clear on exam.   -CXR clear lungs   -RVP negative   -Wean oxygen as tolerated
No clear cause of acute hypoxic RF  -CXR clear lungs, euvolemic on exam  -RVP negative  -weaned off O2 and stable on RA
- report of same, occurring over the week end and w/ witnessed syncope after diarrheal stool prior to coming to the ED  - unclear etiology, but could be affected by chemotherapy  - ensure optimal hydration + replete lytes PRN   - Stool ct  - GI PCR
No clear cause of acute hypoxic RF  -CXR clear lungs, euvolemic on exam  -RVP negative  -weaned off O2 and stable on RA

## 2023-08-28 NOTE — PROGRESS NOTE ADULT - PROBLEM SELECTOR PLAN 10
- patient resides at [facility]  - has HHA during the days, but none at nights and weekends  -SW consulted, assistance appreciated. Plan for discharge back to [facility] with home PT per patient's son/HCP preference.
- patient resides at [facility]  - has HHA during the days, but none at nights and weekends  -SW consulted, assistance appreciated. Plan for discharge back to [facility] with home PT per patient's son/HCP preference.
- patient resides at [facility]  - has HHA during the days, but none at nights and weekends  -SW consulted, assistance appreciated. Plan for discharge back to [facility] with home PT per patient's son/HCP preference.
- patient resides at [facility]  - has HHA during the days, but none at nights and weekends  -SW consulted, assistance appreciated. Plan for discharge back to [facility] with home PT per patient's son/HCP preference.
- patient resides at [facility]  - has HHA during the days, but none at nights and weekends  -SW consulted, assistance appreciated. Plan for discharge back to [facility] with home PT per patient's son/HCP preference.

## 2023-08-28 NOTE — PROGRESS NOTE ADULT - PROBLEM SELECTOR PROBLEM 2
Acute respiratory failure with hypoxia
Diarrhea
Diarrhea
Acute respiratory failure with hypoxia
Acute respiratory failure with hypoxia

## 2023-08-28 NOTE — PROGRESS NOTE ADULT - PROBLEM SELECTOR PLAN 11
DVT ppx: SQH  Diet: DASH soft and bite sized
DVT ppx: SQH  Diet: DASH soft and bite sized   Dispo: Return to [facility] with home PT pending off oxygen and R hand pain/swelling improve   -8/24 CM requested PT re-evaluation as patient was unable to participate on last session

## 2023-08-28 NOTE — PROGRESS NOTE ADULT - PROBLEM SELECTOR PLAN 8
- BP = 89/51 in ED (was 72/42 at the time of LOC)  - appears to be due to dehydration (as related above)  - s/p IVF as above   - encourage oral hydration  - Infectious workup negaative   -Now resolved
- BP = 89/51 in ED (was 72/42 at the time of LOC)  - appears to be due to dehydration (as related above)  - s/p IVF as above   - encourage oral hydration  - Infectious workup negative   -Now resolved
- patient resides at [facility]  - has HHA during the days, but none at nights and weekends  -SW consulted, assistance appreciated. Plan for discharge back to [facility] with home PT per patient's son/HCP preference.
- patient resides at [facility]  - has HHA during the days, but none at nights and weekends  - in the context of syncope, need to evaluate discharge needs  - Social Work consulted

## 2023-08-28 NOTE — PROGRESS NOTE ADULT - SUBJECTIVE AND OBJECTIVE BOX
INTERVAL HPI/OVERNIGHT EVENTS:  Patient S&E at bedside. No o/n events, he reports he feels well overall. No recent diarrhea.    PAST MEDICAL & SURGICAL HISTORY:  Multiple myeloma      Hypertension      Dyslipidemia      Narcolepsy      Forgetfulness      Uses walker      History of use of hearing aid in both ears      [place] (hard of hearing)      Degeneration macular      Vision impairment      Presence of permanent cardiac pacemaker          FAMILY HISTORY:  No family history of malignant neoplasm        REVIEW OF SYSTEMS  General: No fevers, no chills,   Skin: No rash  Respiratory and Thorax: No dyspnea, no cough  Cardiovascular: No chest pain, no palpitations  Gastrointestinal:	No N/V/D, no abdominal pain  Genitourinary: No dysuria  Musculoskeletal:	No joint pain, no muscle pain  Neurological:	No dizziness, no neuropathy  Psychiatric: No depression or anxiety  Hematology/Lymphatics: No easy bleeding or bruising, no swollen nodes noticed.       VITAL SIGNS:  T(F): 97.4 (08-28-23 @ 10:20)  HR: 60 (08-28-23 @ 10:20)  BP: 125/57 (08-28-23 @ 10:20)  RR: 18 (08-28-23 @ 10:20)  SpO2: 98% (08-28-23 @ 10:20)  Wt(kg): --    PHYSICAL EXAM:    Constitutional: NAD  Eyes: EOMI, sclera non-icteric  Neck: supple, no masses  Respiratory: CTA b/l, good air entry b/l  Cardiovascular: RRR, no M/R/G  Gastrointestinal: soft, NTND, no masses palpable, + BS  Extremities: no LE edema  Neurological: Alert and oriented to person and place      MEDICATIONS  (STANDING):  allopurinol 100 milliGRAM(s) Oral daily  cholecalciferol 2000 Unit(s) Oral daily  heparin   Injectable 5000 Unit(s) SubCutaneous every 12 hours  melatonin 3 milliGRAM(s) Oral at bedtime  methylPREDNISolone sodium succinate Injectable 20 milliGRAM(s) IV Push daily  multivitamin 1 Tablet(s) Oral daily  polyethylene glycol 3350 17 Gram(s) Oral daily  senna 2 Tablet(s) Oral at bedtime  tamsulosin 0.4 milliGRAM(s) Oral at bedtime  valACYclovir 500 milliGRAM(s) Oral two times a day    MEDICATIONS  (PRN):  acetaminophen     Tablet .. 650 milliGRAM(s) Oral every 6 hours PRN Mild Pain (1 - 3), Moderate Pain (4 - 6)  ondansetron    Tablet 8 milliGRAM(s) Oral every 8 hours PRN for indigestion  traMADol 25 milliGRAM(s) Oral every 8 hours PRN moderate to severe pain      Allergies    No Known Allergies    Intolerances        LABS:                        9.4    4.14  )-----------( 357      ( 28 Aug 2023 05:50 )             28.0     08-28    140  |  107  |  44<H>  ----------------------------<  97  4.3   |  22  |  1.00    Ca    8.3<L>      28 Aug 2023 05:50  Phos  2.4     08-28  Mg     2.00     08-28        Urinalysis Basic - ( 28 Aug 2023 05:50 )    Color: x / Appearance: x / SG: x / pH: x  Gluc: 97 mg/dL / Ketone: x  / Bili: x / Urobili: x   Blood: x / Protein: x / Nitrite: x   Leuk Esterase: x / RBC: x / WBC x   Sq Epi: x / Non Sq Epi: x / Bacteria: x        RADIOLOGY & ADDITIONAL TESTS:  Studies reviewed.

## 2023-08-29 ENCOUNTER — TRANSCRIPTION ENCOUNTER (OUTPATIENT)
Age: 88
End: 2023-08-29

## 2023-08-29 LAB
ANION GAP SERPL CALC-SCNC: 10 MMOL/L — SIGNIFICANT CHANGE UP (ref 7–14)
BUN SERPL-MCNC: 43 MG/DL — HIGH (ref 7–23)
CALCIUM SERPL-MCNC: 8.3 MG/DL — LOW (ref 8.4–10.5)
CHLORIDE SERPL-SCNC: 107 MMOL/L — SIGNIFICANT CHANGE UP (ref 98–107)
CO2 SERPL-SCNC: 23 MMOL/L — SIGNIFICANT CHANGE UP (ref 22–31)
CREAT SERPL-MCNC: 0.89 MG/DL — SIGNIFICANT CHANGE UP (ref 0.5–1.3)
EGFR: 81 ML/MIN/1.73M2 — SIGNIFICANT CHANGE UP
GLUCOSE SERPL-MCNC: 87 MG/DL — SIGNIFICANT CHANGE UP (ref 70–99)
HCT VFR BLD CALC: 28.9 % — LOW (ref 39–50)
HGB BLD-MCNC: 9.3 G/DL — LOW (ref 13–17)
MAGNESIUM SERPL-MCNC: 2.3 MG/DL — SIGNIFICANT CHANGE UP (ref 1.6–2.6)
MCHC RBC-ENTMCNC: 31.3 PG — SIGNIFICANT CHANGE UP (ref 27–34)
MCHC RBC-ENTMCNC: 32.2 GM/DL — SIGNIFICANT CHANGE UP (ref 32–36)
MCV RBC AUTO: 97.3 FL — SIGNIFICANT CHANGE UP (ref 80–100)
NRBC # BLD: 0 /100 WBCS — SIGNIFICANT CHANGE UP (ref 0–0)
NRBC # FLD: 0 K/UL — SIGNIFICANT CHANGE UP (ref 0–0)
PHOSPHATE SERPL-MCNC: 2.6 MG/DL — SIGNIFICANT CHANGE UP (ref 2.5–4.5)
PLATELET # BLD AUTO: 413 K/UL — HIGH (ref 150–400)
POTASSIUM SERPL-MCNC: 4.5 MMOL/L — SIGNIFICANT CHANGE UP (ref 3.5–5.3)
POTASSIUM SERPL-SCNC: 4.5 MMOL/L — SIGNIFICANT CHANGE UP (ref 3.5–5.3)
RBC # BLD: 2.97 M/UL — LOW (ref 4.2–5.8)
RBC # FLD: 14.3 % — SIGNIFICANT CHANGE UP (ref 10.3–14.5)
SODIUM SERPL-SCNC: 140 MMOL/L — SIGNIFICANT CHANGE UP (ref 135–145)
WBC # BLD: 4.01 K/UL — SIGNIFICANT CHANGE UP (ref 3.8–10.5)
WBC # FLD AUTO: 4.01 K/UL — SIGNIFICANT CHANGE UP (ref 3.8–10.5)

## 2023-08-29 PROCEDURE — 99239 HOSP IP/OBS DSCHRG MGMT >30: CPT

## 2023-08-29 RX ORDER — CHOLECALCIFEROL (VITAMIN D3) 125 MCG
2000 CAPSULE ORAL
Qty: 0 | Refills: 0 | DISCHARGE
Start: 2023-08-29

## 2023-08-29 RX ORDER — ALLOPURINOL 300 MG
1 TABLET ORAL
Qty: 0 | Refills: 0 | DISCHARGE
Start: 2023-08-29

## 2023-08-29 RX ORDER — LENALIDOMIDE 5 MG/1
1 CAPSULE ORAL
Refills: 0 | DISCHARGE

## 2023-08-29 RX ORDER — ACETAMINOPHEN 500 MG
2 TABLET ORAL
Qty: 0 | Refills: 0 | DISCHARGE
Start: 2023-08-29

## 2023-08-29 RX ADMIN — Medication 2000 UNIT(S): at 11:41

## 2023-08-29 RX ADMIN — VALACYCLOVIR 500 MILLIGRAM(S): 500 TABLET, FILM COATED ORAL at 18:29

## 2023-08-29 RX ADMIN — TAMSULOSIN HYDROCHLORIDE 0.4 MILLIGRAM(S): 0.4 CAPSULE ORAL at 21:08

## 2023-08-29 RX ADMIN — SENNA PLUS 2 TABLET(S): 8.6 TABLET ORAL at 21:08

## 2023-08-29 RX ADMIN — Medication 1 TABLET(S): at 11:41

## 2023-08-29 RX ADMIN — Medication 3 MILLIGRAM(S): at 21:08

## 2023-08-29 RX ADMIN — HEPARIN SODIUM 5000 UNIT(S): 5000 INJECTION INTRAVENOUS; SUBCUTANEOUS at 18:29

## 2023-08-29 RX ADMIN — VALACYCLOVIR 500 MILLIGRAM(S): 500 TABLET, FILM COATED ORAL at 05:56

## 2023-08-29 RX ADMIN — Medication 100 MILLIGRAM(S): at 11:41

## 2023-08-29 RX ADMIN — Medication 20 MILLIGRAM(S): at 05:56

## 2023-08-29 RX ADMIN — HEPARIN SODIUM 5000 UNIT(S): 5000 INJECTION INTRAVENOUS; SUBCUTANEOUS at 05:56

## 2023-08-29 NOTE — DISCHARGE NOTE NURSING/CASE MANAGEMENT/SOCIAL WORK - PATIENT PORTAL LINK FT
You can access the FollowMyHealth Patient Portal offered by [facility] by registering at the following website: [email or web address]. By joining [facility]’s FollowMyHealth portal, you will also be able to view your health information using other applications (apps) compatible with our system.

## 2023-08-29 NOTE — DISCHARGE NOTE NURSING/CASE MANAGEMENT/SOCIAL WORK - NSSCNAMETXT_GEN_ALL_CORE
[facility]- this agency will continue your care at home starting on 8/31/23.

## 2023-08-29 NOTE — CHART NOTE - NSCHARTNOTEFT_GEN_A_CORE
pt seen and examined. vitals, labs, rheum and hematology note reviewed. Pt is stable for discharge to assisted living facility today. plan discussed with pt and family who are in agreement    total time spent on dc 42min

## 2023-08-30 VITALS
RESPIRATION RATE: 18 BRPM | HEART RATE: 61 BPM | TEMPERATURE: 97 F | SYSTOLIC BLOOD PRESSURE: 136 MMHG | DIASTOLIC BLOOD PRESSURE: 71 MMHG | OXYGEN SATURATION: 98 %

## 2023-08-30 LAB
ANION GAP SERPL CALC-SCNC: 12 MMOL/L — SIGNIFICANT CHANGE UP (ref 7–14)
BASOPHILS # BLD AUTO: 0.05 K/UL — SIGNIFICANT CHANGE UP (ref 0–0.2)
BASOPHILS NFR BLD AUTO: 1.1 % — SIGNIFICANT CHANGE UP (ref 0–2)
BUN SERPL-MCNC: 40 MG/DL — HIGH (ref 7–23)
CALCIUM SERPL-MCNC: 8.7 MG/DL — SIGNIFICANT CHANGE UP (ref 8.4–10.5)
CHLORIDE SERPL-SCNC: 106 MMOL/L — SIGNIFICANT CHANGE UP (ref 98–107)
CO2 SERPL-SCNC: 20 MMOL/L — LOW (ref 22–31)
CREAT SERPL-MCNC: 0.84 MG/DL — SIGNIFICANT CHANGE UP (ref 0.5–1.3)
EGFR: 82 ML/MIN/1.73M2 — SIGNIFICANT CHANGE UP
EOSINOPHIL # BLD AUTO: 0.07 K/UL — SIGNIFICANT CHANGE UP (ref 0–0.5)
EOSINOPHIL NFR BLD AUTO: 1.5 % — SIGNIFICANT CHANGE UP (ref 0–6)
GLUCOSE SERPL-MCNC: 87 MG/DL — SIGNIFICANT CHANGE UP (ref 70–99)
HCT VFR BLD CALC: 30.6 % — LOW (ref 39–50)
HGB BLD-MCNC: 10.1 G/DL — LOW (ref 13–17)
IANC: 2.26 K/UL — SIGNIFICANT CHANGE UP (ref 1.8–7.4)
IMM GRANULOCYTES NFR BLD AUTO: 2.2 % — HIGH (ref 0–0.9)
LYMPHOCYTES # BLD AUTO: 1.41 K/UL — SIGNIFICANT CHANGE UP (ref 1–3.3)
LYMPHOCYTES # BLD AUTO: 30.6 % — SIGNIFICANT CHANGE UP (ref 13–44)
MAGNESIUM SERPL-MCNC: 1.9 MG/DL — SIGNIFICANT CHANGE UP (ref 1.6–2.6)
MCHC RBC-ENTMCNC: 31.9 PG — SIGNIFICANT CHANGE UP (ref 27–34)
MCHC RBC-ENTMCNC: 33 GM/DL — SIGNIFICANT CHANGE UP (ref 32–36)
MCV RBC AUTO: 96.5 FL — SIGNIFICANT CHANGE UP (ref 80–100)
MONOCYTES # BLD AUTO: 0.72 K/UL — SIGNIFICANT CHANGE UP (ref 0–0.9)
MONOCYTES NFR BLD AUTO: 15.6 % — HIGH (ref 2–14)
NEUTROPHILS # BLD AUTO: 2.26 K/UL — SIGNIFICANT CHANGE UP (ref 1.8–7.4)
NEUTROPHILS NFR BLD AUTO: 49 % — SIGNIFICANT CHANGE UP (ref 43–77)
NRBC # BLD: 0 /100 WBCS — SIGNIFICANT CHANGE UP (ref 0–0)
NRBC # FLD: 0 K/UL — SIGNIFICANT CHANGE UP (ref 0–0)
PHOSPHATE SERPL-MCNC: 3.1 MG/DL — SIGNIFICANT CHANGE UP (ref 2.5–4.5)
PLATELET # BLD AUTO: 450 K/UL — HIGH (ref 150–400)
POTASSIUM SERPL-MCNC: 4.4 MMOL/L — SIGNIFICANT CHANGE UP (ref 3.5–5.3)
POTASSIUM SERPL-SCNC: 4.4 MMOL/L — SIGNIFICANT CHANGE UP (ref 3.5–5.3)
RBC # BLD: 3.17 M/UL — LOW (ref 4.2–5.8)
RBC # FLD: 13.9 % — SIGNIFICANT CHANGE UP (ref 10.3–14.5)
SODIUM SERPL-SCNC: 138 MMOL/L — SIGNIFICANT CHANGE UP (ref 135–145)
WBC # BLD: 4.61 K/UL — SIGNIFICANT CHANGE UP (ref 3.8–10.5)
WBC # FLD AUTO: 4.61 K/UL — SIGNIFICANT CHANGE UP (ref 3.8–10.5)

## 2023-08-30 RX ADMIN — Medication 20 MILLIGRAM(S): at 06:07

## 2023-08-30 RX ADMIN — HEPARIN SODIUM 5000 UNIT(S): 5000 INJECTION INTRAVENOUS; SUBCUTANEOUS at 06:07

## 2023-08-30 RX ADMIN — VALACYCLOVIR 500 MILLIGRAM(S): 500 TABLET, FILM COATED ORAL at 06:07

## 2023-09-05 ENCOUNTER — APPOINTMENT (OUTPATIENT)
Dept: INFUSION THERAPY | Facility: HOSPITAL | Age: 88
End: 2023-09-05

## 2023-09-05 ENCOUNTER — APPOINTMENT (OUTPATIENT)
Dept: HEMATOLOGY ONCOLOGY | Facility: CLINIC | Age: 88
End: 2023-09-05

## 2023-09-06 ENCOUNTER — NON-APPOINTMENT (OUTPATIENT)
Age: 88
End: 2023-09-06

## 2023-09-11 ENCOUNTER — INPATIENT (INPATIENT)
Facility: HOSPITAL | Age: 88
LOS: 2 days | Discharge: NURSING, REHAB OR LONG-TERM CARE | DRG: 871 | End: 2023-09-14
Attending: STUDENT IN AN ORGANIZED HEALTH CARE EDUCATION/TRAINING PROGRAM | Admitting: STUDENT IN AN ORGANIZED HEALTH CARE EDUCATION/TRAINING PROGRAM
Payer: MEDICARE

## 2023-09-11 VITALS
HEART RATE: 71 BPM | TEMPERATURE: 98 F | SYSTOLIC BLOOD PRESSURE: 127 MMHG | HEIGHT: 67 IN | OXYGEN SATURATION: 94 % | WEIGHT: 160.06 LBS | DIASTOLIC BLOOD PRESSURE: 68 MMHG | RESPIRATION RATE: 20 BRPM

## 2023-09-11 DIAGNOSIS — J18.9 PNEUMONIA, UNSPECIFIED ORGANISM: ICD-10-CM

## 2023-09-11 DIAGNOSIS — H10.9 UNSPECIFIED CONJUNCTIVITIS: ICD-10-CM

## 2023-09-11 DIAGNOSIS — Z95.0 PRESENCE OF CARDIAC PACEMAKER: Chronic | ICD-10-CM

## 2023-09-11 DIAGNOSIS — Z29.9 ENCOUNTER FOR PROPHYLACTIC MEASURES, UNSPECIFIED: ICD-10-CM

## 2023-09-11 DIAGNOSIS — I10 ESSENTIAL (PRIMARY) HYPERTENSION: ICD-10-CM

## 2023-09-11 DIAGNOSIS — Z87.438 PERSONAL HISTORY OF OTHER DISEASES OF MALE GENITAL ORGANS: ICD-10-CM

## 2023-09-11 DIAGNOSIS — Z87.39 PERSONAL HISTORY OF OTHER DISEASES OF THE MUSCULOSKELETAL SYSTEM AND CONNECTIVE TISSUE: ICD-10-CM

## 2023-09-11 PROBLEM — H91.90 UNSPECIFIED HEARING LOSS, UNSPECIFIED EAR: Chronic | Status: ACTIVE | Noted: 2023-08-22

## 2023-09-11 PROBLEM — E78.5 HYPERLIPIDEMIA, UNSPECIFIED: Chronic | Status: ACTIVE | Noted: 2023-08-21

## 2023-09-11 PROBLEM — H54.7 UNSPECIFIED VISUAL LOSS: Chronic | Status: ACTIVE | Noted: 2023-08-22

## 2023-09-11 PROBLEM — G47.419 NARCOLEPSY WITHOUT CATAPLEXY: Chronic | Status: ACTIVE | Noted: 2023-08-21

## 2023-09-11 PROBLEM — H35.30 UNSPECIFIED MACULAR DEGENERATION: Chronic | Status: ACTIVE | Noted: 2023-08-22

## 2023-09-11 PROBLEM — Z92.89 PERSONAL HISTORY OF OTHER MEDICAL TREATMENT: Chronic | Status: ACTIVE | Noted: 2023-08-22

## 2023-09-11 PROBLEM — R68.89 OTHER GENERAL SYMPTOMS AND SIGNS: Chronic | Status: ACTIVE | Noted: 2023-08-22

## 2023-09-11 PROBLEM — C90.00 MULTIPLE MYELOMA NOT HAVING ACHIEVED REMISSION: Chronic | Status: ACTIVE | Noted: 2023-08-21

## 2023-09-11 PROBLEM — Z99.89 DEPENDENCE ON OTHER ENABLING MACHINES AND DEVICES: Chronic | Status: ACTIVE | Noted: 2023-08-22

## 2023-09-11 LAB
ALBUMIN SERPL ELPH-MCNC: 3.7 G/DL — SIGNIFICANT CHANGE UP (ref 3.3–5)
ALP SERPL-CCNC: 117 U/L — SIGNIFICANT CHANGE UP (ref 40–120)
ALT FLD-CCNC: 17 U/L — SIGNIFICANT CHANGE UP (ref 10–45)
ANION GAP SERPL CALC-SCNC: 13 MMOL/L — SIGNIFICANT CHANGE UP (ref 5–17)
APPEARANCE UR: CLEAR — SIGNIFICANT CHANGE UP
APTT BLD: 32.8 SEC — SIGNIFICANT CHANGE UP (ref 24.5–35.6)
AST SERPL-CCNC: 14 U/L — SIGNIFICANT CHANGE UP (ref 10–40)
BASOPHILS # BLD AUTO: 0.05 K/UL — SIGNIFICANT CHANGE UP (ref 0–0.2)
BASOPHILS NFR BLD AUTO: 0.4 % — SIGNIFICANT CHANGE UP (ref 0–2)
BILIRUB SERPL-MCNC: 0.5 MG/DL — SIGNIFICANT CHANGE UP (ref 0.2–1.2)
BILIRUB UR-MCNC: NEGATIVE — SIGNIFICANT CHANGE UP
BUN SERPL-MCNC: 29 MG/DL — HIGH (ref 7–23)
CALCIUM SERPL-MCNC: 9 MG/DL — SIGNIFICANT CHANGE UP (ref 8.4–10.5)
CHLORIDE SERPL-SCNC: 103 MMOL/L — SIGNIFICANT CHANGE UP (ref 96–108)
CO2 SERPL-SCNC: 22 MMOL/L — SIGNIFICANT CHANGE UP (ref 22–31)
COLOR SPEC: YELLOW — SIGNIFICANT CHANGE UP
CREAT SERPL-MCNC: 1.22 MG/DL — SIGNIFICANT CHANGE UP (ref 0.5–1.3)
DIFF PNL FLD: NEGATIVE — SIGNIFICANT CHANGE UP
EGFR: 56 ML/MIN/1.73M2 — LOW
EOSINOPHIL # BLD AUTO: 0.14 K/UL — SIGNIFICANT CHANGE UP (ref 0–0.5)
EOSINOPHIL NFR BLD AUTO: 1 % — SIGNIFICANT CHANGE UP (ref 0–6)
GAS PNL BLDV: SIGNIFICANT CHANGE UP
GLUCOSE SERPL-MCNC: 120 MG/DL — HIGH (ref 70–99)
GLUCOSE UR QL: NEGATIVE — SIGNIFICANT CHANGE UP
HCT VFR BLD CALC: 34.7 % — LOW (ref 39–50)
HGB BLD-MCNC: 11 G/DL — LOW (ref 13–17)
IMM GRANULOCYTES NFR BLD AUTO: 0.9 % — SIGNIFICANT CHANGE UP (ref 0–0.9)
INR BLD: 1.23 RATIO — HIGH (ref 0.85–1.18)
KETONES UR-MCNC: NEGATIVE — SIGNIFICANT CHANGE UP
LACTATE SERPL-SCNC: 1.5 MMOL/L — SIGNIFICANT CHANGE UP (ref 0.5–2)
LEUKOCYTE ESTERASE UR-ACNC: ABNORMAL
LYMPHOCYTES # BLD AUTO: 1.05 K/UL — SIGNIFICANT CHANGE UP (ref 1–3.3)
LYMPHOCYTES # BLD AUTO: 7.6 % — LOW (ref 13–44)
MAGNESIUM SERPL-MCNC: 2.6 MG/DL — SIGNIFICANT CHANGE UP (ref 1.6–2.6)
MCHC RBC-ENTMCNC: 31.7 GM/DL — LOW (ref 32–36)
MCHC RBC-ENTMCNC: 31.8 PG — SIGNIFICANT CHANGE UP (ref 27–34)
MCV RBC AUTO: 100.3 FL — HIGH (ref 80–100)
MONOCYTES # BLD AUTO: 1.16 K/UL — HIGH (ref 0–0.9)
MONOCYTES NFR BLD AUTO: 8.4 % — SIGNIFICANT CHANGE UP (ref 2–14)
NEUTROPHILS # BLD AUTO: 11.28 K/UL — HIGH (ref 1.8–7.4)
NEUTROPHILS NFR BLD AUTO: 81.7 % — HIGH (ref 43–77)
NITRITE UR-MCNC: NEGATIVE — SIGNIFICANT CHANGE UP
NRBC # BLD: 0 /100 WBCS — SIGNIFICANT CHANGE UP (ref 0–0)
NT-PROBNP SERPL-SCNC: 350 PG/ML — HIGH (ref 0–300)
PH UR: 6 — SIGNIFICANT CHANGE UP (ref 5–8)
PLATELET # BLD AUTO: 349 K/UL — SIGNIFICANT CHANGE UP (ref 150–400)
POTASSIUM SERPL-MCNC: 4.7 MMOL/L — SIGNIFICANT CHANGE UP (ref 3.5–5.3)
POTASSIUM SERPL-SCNC: 4.7 MMOL/L — SIGNIFICANT CHANGE UP (ref 3.5–5.3)
PROT SERPL-MCNC: 7 G/DL — SIGNIFICANT CHANGE UP (ref 6–8.3)
PROT UR-MCNC: ABNORMAL
PROTHROM AB SERPL-ACNC: 13.5 SEC — HIGH (ref 9.5–13)
RAPID RVP RESULT: SIGNIFICANT CHANGE UP
RBC # BLD: 3.46 M/UL — LOW (ref 4.2–5.8)
RBC # FLD: 14.6 % — HIGH (ref 10.3–14.5)
SARS-COV-2 RNA SPEC QL NAA+PROBE: SIGNIFICANT CHANGE UP
SODIUM SERPL-SCNC: 138 MMOL/L — SIGNIFICANT CHANGE UP (ref 135–145)
SP GR SPEC: 1.02 — SIGNIFICANT CHANGE UP (ref 1.01–1.02)
TROPONIN T, HIGH SENSITIVITY RESULT: 25 NG/L — SIGNIFICANT CHANGE UP (ref 0–51)
UROBILINOGEN FLD QL: NEGATIVE — SIGNIFICANT CHANGE UP
WBC # BLD: 13.8 K/UL — HIGH (ref 3.8–10.5)
WBC # FLD AUTO: 13.8 K/UL — HIGH (ref 3.8–10.5)

## 2023-09-11 PROCEDURE — 99497 ADVNCD CARE PLAN 30 MIN: CPT | Mod: 25

## 2023-09-11 PROCEDURE — 99223 1ST HOSP IP/OBS HIGH 75: CPT

## 2023-09-11 PROCEDURE — 71045 X-RAY EXAM CHEST 1 VIEW: CPT | Mod: 26

## 2023-09-11 PROCEDURE — 99285 EMERGENCY DEPT VISIT HI MDM: CPT

## 2023-09-11 RX ORDER — OFLOXACIN 0.3 %
2 DROPS OPHTHALMIC (EYE) EVERY 4 HOURS
Refills: 0 | Status: COMPLETED | OUTPATIENT
Start: 2023-09-11 | End: 2023-09-13

## 2023-09-11 RX ORDER — VALSARTAN 80 MG/1
80 TABLET ORAL DAILY
Refills: 0 | Status: DISCONTINUED | OUTPATIENT
Start: 2023-09-11 | End: 2023-09-14

## 2023-09-11 RX ORDER — OFLOXACIN 0.3 %
2 DROPS OPHTHALMIC (EYE) EVERY 6 HOURS
Refills: 0 | Status: DISCONTINUED | OUTPATIENT
Start: 2023-09-13 | End: 2023-09-14

## 2023-09-11 RX ORDER — ACETAMINOPHEN 500 MG
650 TABLET ORAL EVERY 6 HOURS
Refills: 0 | Status: DISCONTINUED | OUTPATIENT
Start: 2023-09-11 | End: 2023-09-14

## 2023-09-11 RX ORDER — PIPERACILLIN AND TAZOBACTAM 4; .5 G/20ML; G/20ML
3.38 INJECTION, POWDER, LYOPHILIZED, FOR SOLUTION INTRAVENOUS ONCE
Refills: 0 | Status: COMPLETED | OUTPATIENT
Start: 2023-09-11 | End: 2023-09-11

## 2023-09-11 RX ORDER — CHOLECALCIFEROL (VITAMIN D3) 125 MCG
2000 CAPSULE ORAL DAILY
Refills: 0 | Status: DISCONTINUED | OUTPATIENT
Start: 2023-09-11 | End: 2023-09-14

## 2023-09-11 RX ORDER — ALLOPURINOL 300 MG
300 TABLET ORAL DAILY
Refills: 0 | Status: DISCONTINUED | OUTPATIENT
Start: 2023-09-11 | End: 2023-09-14

## 2023-09-11 RX ORDER — ONDANSETRON 8 MG/1
4 TABLET, FILM COATED ORAL EVERY 8 HOURS
Refills: 0 | Status: DISCONTINUED | OUTPATIENT
Start: 2023-09-11 | End: 2023-09-14

## 2023-09-11 RX ORDER — SODIUM CHLORIDE 9 MG/ML
1000 INJECTION INTRAMUSCULAR; INTRAVENOUS; SUBCUTANEOUS ONCE
Refills: 0 | Status: COMPLETED | OUTPATIENT
Start: 2023-09-11 | End: 2023-09-11

## 2023-09-11 RX ORDER — POLYETHYLENE GLYCOL 3350 17 G/17G
17 POWDER, FOR SOLUTION ORAL AT BEDTIME
Refills: 0 | Status: DISCONTINUED | OUTPATIENT
Start: 2023-09-11 | End: 2023-09-14

## 2023-09-11 RX ORDER — ENOXAPARIN SODIUM 100 MG/ML
40 INJECTION SUBCUTANEOUS EVERY 24 HOURS
Refills: 0 | Status: DISCONTINUED | OUTPATIENT
Start: 2023-09-11 | End: 2023-09-14

## 2023-09-11 RX ORDER — VANCOMYCIN HCL 1 G
1000 VIAL (EA) INTRAVENOUS ONCE
Refills: 0 | Status: COMPLETED | OUTPATIENT
Start: 2023-09-11 | End: 2023-09-11

## 2023-09-11 RX ORDER — LANOLIN ALCOHOL/MO/W.PET/CERES
3 CREAM (GRAM) TOPICAL AT BEDTIME
Refills: 0 | Status: DISCONTINUED | OUTPATIENT
Start: 2023-09-11 | End: 2023-09-14

## 2023-09-11 RX ORDER — TAMSULOSIN HYDROCHLORIDE 0.4 MG/1
0.4 CAPSULE ORAL AT BEDTIME
Refills: 0 | Status: DISCONTINUED | OUTPATIENT
Start: 2023-09-11 | End: 2023-09-14

## 2023-09-11 RX ORDER — PIPERACILLIN AND TAZOBACTAM 4; .5 G/20ML; G/20ML
3.38 INJECTION, POWDER, LYOPHILIZED, FOR SOLUTION INTRAVENOUS EVERY 8 HOURS
Refills: 0 | Status: DISCONTINUED | OUTPATIENT
Start: 2023-09-11 | End: 2023-09-14

## 2023-09-11 RX ORDER — VALACYCLOVIR 500 MG/1
500 TABLET, FILM COATED ORAL
Refills: 0 | Status: DISCONTINUED | OUTPATIENT
Start: 2023-09-11 | End: 2023-09-14

## 2023-09-11 RX ORDER — SODIUM CHLORIDE 9 MG/ML
500 INJECTION INTRAMUSCULAR; INTRAVENOUS; SUBCUTANEOUS ONCE
Refills: 0 | Status: DISCONTINUED | OUTPATIENT
Start: 2023-09-11 | End: 2023-09-11

## 2023-09-11 RX ORDER — ACETAMINOPHEN 500 MG
1000 TABLET ORAL ONCE
Refills: 0 | Status: COMPLETED | OUTPATIENT
Start: 2023-09-11 | End: 2023-09-11

## 2023-09-11 RX ADMIN — VALACYCLOVIR 500 MILLIGRAM(S): 500 TABLET, FILM COATED ORAL at 23:18

## 2023-09-11 RX ADMIN — PIPERACILLIN AND TAZOBACTAM 200 GRAM(S): 4; .5 INJECTION, POWDER, LYOPHILIZED, FOR SOLUTION INTRAVENOUS at 14:58

## 2023-09-11 RX ADMIN — SODIUM CHLORIDE 1000 MILLILITER(S): 9 INJECTION INTRAMUSCULAR; INTRAVENOUS; SUBCUTANEOUS at 14:56

## 2023-09-11 RX ADMIN — PIPERACILLIN AND TAZOBACTAM 25 GRAM(S): 4; .5 INJECTION, POWDER, LYOPHILIZED, FOR SOLUTION INTRAVENOUS at 22:09

## 2023-09-11 RX ADMIN — Medication 2 DROP(S): at 23:18

## 2023-09-11 RX ADMIN — Medication 3 MILLIGRAM(S): at 22:10

## 2023-09-11 RX ADMIN — Medication 250 MILLIGRAM(S): at 15:24

## 2023-09-11 RX ADMIN — TAMSULOSIN HYDROCHLORIDE 0.4 MILLIGRAM(S): 0.4 CAPSULE ORAL at 22:10

## 2023-09-11 RX ADMIN — ENOXAPARIN SODIUM 40 MILLIGRAM(S): 100 INJECTION SUBCUTANEOUS at 22:10

## 2023-09-11 RX ADMIN — Medication 1000 MILLIGRAM(S): at 16:00

## 2023-09-11 RX ADMIN — Medication 400 MILLIGRAM(S): at 14:58

## 2023-09-11 RX ADMIN — Medication 2 DROP(S): at 16:17

## 2023-09-11 NOTE — H&P ADULT - ASSESSMENT
89 year old male with a PMHx of dementia (AAOx2-3 at baseline), acute macular degeneration of both eyes, multiple myeloma (no longer on chemotherapy) who presents from [facility] with sepsis secondary to pneumonia and acute bacterial conjunctivitis.

## 2023-09-11 NOTE — H&P ADULT - PROBLEM SELECTOR PLAN 2
Bacterial conjunctivitis affecting the left eye  - start ofloxacin gtt to the left eye, 2 drops every 4 hours x2 days, followed by 2 drops every 6 hours for 5 days

## 2023-09-11 NOTE — ED PROVIDER NOTE - PHYSICAL EXAMINATION
PHYSICAL EXAM:    GENERAL: NAD, lying in bed comfortably  HEAD:  Atraumatic, Normocephalic  EYES: EOMI, PERRLA, conjunctiva and sclera clear  ENT: No erythema/pallor/petechiae/lesions  NECK: Supple, No JVD  LUNG: Left sided creps.   HEART: RRR, +S1/S2; No m/r/g  ABDOMEN: soft, NT/ND; BS audible   EXTREMITIES:  2+ Peripheral Pulses, brisk cap refill.  NERVOUS SYSTEM:  AAOx3, speech clear. No sensory/motor deficits   SKIN: No rashes or lesions

## 2023-09-11 NOTE — ED PROVIDER NOTE - NSICDXPASTMEDICALHX_GEN_ALL_CORE_FT
PAST MEDICAL HISTORY:  Degeneration macular     Dyslipidemia     Forgetfulness     History of use of hearing aid in both ears     [place] (hard of hearing)     Hypertension     Multiple myeloma     Narcolepsy     Uses walker     Vision impairment

## 2023-09-11 NOTE — H&P ADULT - PROBLEM SELECTOR PLAN 6
- DVT ppx: lovenox  - GI ppx: none  - Antiviral: home valacyclovir 500mg BID  - Diet: soft and bite-sized  - Code status: DNR and DNI. Yes to IV fluids as needed, Yes to antibiotics as needed  - HCP: sonZack  - previously completed MOLST form, HCP form, and living will are available for review in the patient's chart

## 2023-09-11 NOTE — ED PROVIDER NOTE - NS ED ROS FT
CONSTITUTIONAL: c/o lethargy   EYES/ENT: No visual changes;  No vertigo or throat pain   NECK: No pain or stiffness  RESPIRATORY: c/o cough, SOB   CARDIOVASCULAR: c/o right CP  GASTROINTESTINAL: No abdominal or epigastric pain. No nausea, vomiting, or hematemesis; No diarrhea or constipation.   GENITOURINARY: No dysuria, frequency   NEUROLOGICAL: No numbness or weakness  SKIN: No itching, burning, rashes, or lesions     All other review of systems is negative unless indicated above.

## 2023-09-11 NOTE — ED ADULT NURSE NOTE - OBJECTIVE STATEMENT
89yo m aaox4 h/o dementia, multiple myeloma, presents to Ed via EMS, as per pt 1 week ago has been having productive and SOB  cough, with R side chest discomfort x2 days , denies f/c, uri symptoms, Safety and comfort measures initiated- bed placed in lowest position and side rails raised. Pt oriented to call bell system.

## 2023-09-11 NOTE — H&P ADULT - HISTORY OF PRESENT ILLNESS
89 year old male with a PMHx of dementia (AAOx2-3 at baseline), acute macular degeneration of both eyes, multiple myeloma (no longer on chemotherapy) who presents from [facility] for cough. The patient was interviewed with his home health aide at bedside. The patient developed a cough about one week ago. Nonproductive and not associated with any subjective fevers/chills. Pt himself did not endorse chest pain, but per his home health aide, the patient had complained of right sided chest pain, worse with inspiration. Home health aide also felt the patient was more confused than his normal self, as he took a minute to recognize his home health aide who has worked for him the past several months. She also noted crusting about his left eye This is not typical for him. Otherwise, no shortness of breath, no abdominal pain, no lower extremity edema.

## 2023-09-11 NOTE — H&P ADULT - NSHPPHYSICALEXAM_GEN_ALL_CORE
Vital Signs Last 24 Hrs  T(C): 38.8 (11 Sep 2023 13:22), Max: 38.8 (11 Sep 2023 13:22)  T(F): 101.9 (11 Sep 2023 13:22), Max: 101.9 (11 Sep 2023 13:22)  HR: 68 (11 Sep 2023 13:22) (68 - 71)  BP: 154/83 (11 Sep 2023 13:22) (127/68 - 154/83)  BP(mean): --  RR: 18 (11 Sep 2023 13:22) (18 - 20)  SpO2: 96% (11 Sep 2023 13:22) (94% - 96%)    Parameters below as of 11 Sep 2023 13:22  Patient On (Oxygen Delivery Method): room air

## 2023-09-11 NOTE — ED ADULT NURSE NOTE - NS ED NOTE ABUSE RESPONSE YN
[place] [employer] contacted regarding the animal bite.  They initially state that they are currently understaffed with a festival in [facility] and requested that the patient stop at the department on their way home to file the report.  Explained that the patient nearly already put the animal down, the animal is not up to date on its rabies vaccination, and writer is unsure if the patient would follow through on reporting.  They will attempt to get an officer to this location.   Yes

## 2023-09-11 NOTE — H&P ADULT - CONVERSATION DETAILS
Discussed goals of care with the patient's healthcare proxy, Rudi Garcia (son). The son was updated on the patient's hospital course. He has a previous completed MOLST form from Sept 2023 which outlined wishes for DNR and DNI code status. Mr. Garcia confirms that this continues to be his code status.    The MOLST form also delineated wishes for no feed tube, no IV fluids, and no use of antibiotics. Reviewed these wishes with Rudi Garcia. In the setting of the current clinical situation where the patient has an acute pneumonia and may improve with a course of IV antibiotics and fluids (if needed), Mr. Garcia agrees with the use of these interventions.    In summary, DNR, DNI, yes to IV antibiotics as needed and yes to IV fluids as needed.  A copy of the patient's previously completed MOLST form, Living will and health care power of  are available in the patient's paper chart.

## 2023-09-11 NOTE — ED PROVIDER NOTE - CLINICAL SUMMARY MEDICAL DECISION MAKING FREE TEXT BOX
89-year-old male past medical history of dementia, acute macular degeneration of both eyes, multiple myeloma on no chemotherapy resident of [facility] now presents with cough for past 1 week, right-sided chest discomfort for 2 days. Hypoxic to 80 %. On exam left sided creps. Concerned for PNA. Will get labs, CXR. Likely admit.

## 2023-09-11 NOTE — H&P ADULT - NSHPADDITIONALINFOADULT_GEN_ALL_CORE
Med rec completed but unable to verify with patient or family. It contains Duloxetine, which was not on his discharged medication list from Aug 2023. Please confirm whether the patient actually takes this at home. For now, I held off on restarting it.

## 2023-09-11 NOTE — H&P ADULT - NSICDXPASTMEDICALHX_GEN_ALL_CORE_FT
PAST MEDICAL HISTORY:  Degeneration macular     Dyslipidemia     Forgetfulness     History of use of hearing aid in both ears     [place] (hard of hearing)     Hypertension     Multiple myeloma     Narcolepsy     Uses walker     Vision impairment

## 2023-09-11 NOTE — ED PROVIDER NOTE - OBJECTIVE STATEMENT
89-year-old male past medical history of dementia, acute macular degeneration of both eyes, multiple myeloma on no chemotherapy resident of [facility] now presents with cough for past 1 week, right-sided chest discomfort for 2 days.  Patient history obtained from caretaker, who adds that cough has been episodic, comprising of clear sputum, nonbloody.  Patient has been complaining of right-sided chest pain, aching, constant in nature.  Also looks short of breath.  Of note patient appears to be more lethargic at baseline.

## 2023-09-11 NOTE — PATIENT PROFILE ADULT - FALL HARM RISK - CONCLUSION
Fall with Harm Risk Dupixent Pregnancy And Lactation Text: This medication likely crosses the placenta but the risk for the fetus is uncertain. This medication is excreted in breast milk.

## 2023-09-11 NOTE — PATIENT PROFILE ADULT - FALL HARM RISK - HARM RISK INTERVENTIONS
Assistance with ambulation/Assistance OOB with selected safe patient handling equipment/Communicate Risk of Fall with Harm to all staff/Discuss with provider need for PT consult/Monitor gait and stability/Reinforce activity limits and safety measures with patient and family/Tailored Fall Risk Interventions/Visual Cue: Yellow wristband and red socks/Bed in lowest position, wheels locked, appropriate side rails in place/Call bell, personal items and telephone in reach/Instruct patient to call for assistance before getting out of bed or chair/Non-slip footwear when patient is out of bed/[place] to call system/Physically safe environment - no spills, clutter or unnecessary equipment/Purposeful Proactive Rounding/Room/bathroom lighting operational, light cord in reach

## 2023-09-11 NOTE — H&P ADULT - NSHPLABSRESULTS_GEN_ALL_CORE
LABS:                         11.0   13.80 )-----------( 349      ( 11 Sep 2023 13:26 )             34.7         138  |  103  |  29<H>  ----------------------------<  120<H>  4.7   |  22  |  1.22    Ca    9.0      11 Sep 2023 13:26  Mg     2.6         TPro  7.0  /  Alb  3.7  /  TBili  0.5  /  DBili  x   /  AST  14  /  ALT  17  /  AlkPhos  117  -11    PT/INR - ( 11 Sep 2023 13:26 )   PT: 13.5 sec;   INR: 1.23 ratio         PTT - ( 11 Sep 2023 13:26 )  PTT:32.8 sec  Urinalysis Basic - ( 11 Sep 2023 15:20 )    Color: Yellow / Appearance: Clear / S.020 / pH: x  Gluc: x / Ketone: Negative  / Bili: Negative / Urobili: Negative   Blood: x / Protein: 30 mg/dL / Nitrite: Negative   Leuk Esterase: Moderate / RBC: 3 /hpf / WBC 29 /HPF   Sq Epi: x / Non Sq Epi: x / Bacteria: Negative              Records reviewed from prior hospitalization.  Labs reviewed remarkable for   EKG personally reviewed   CXR personally reviewed LABS:                         11.0   13.80 )-----------( 349      ( 11 Sep 2023 13:26 )             34.7     11    138  |  103  |  29<H>  ----------------------------<  120<H>  4.7   |  22  |  1.22    Ca    9.0      11 Sep 2023 13:26  Mg     2.6         TPro  7.0  /  Alb  3.7  /  TBili  0.5  /  DBili  x   /  AST  14  /  ALT  17  /  AlkPhos  117  -11    PT/INR - ( 11 Sep 2023 13:26 )   PT: 13.5 sec;   INR: 1.23 ratio         PTT - ( 11 Sep 2023 13:26 )  PTT:32.8 sec  Urinalysis Basic - ( 11 Sep 2023 15:20 )    Color: Yellow / Appearance: Clear / S.020 / pH: x  Gluc: x / Ketone: Negative  / Bili: Negative / Urobili: Negative   Blood: x / Protein: 30 mg/dL / Nitrite: Negative   Leuk Esterase: Moderate / RBC: 3 /hpf / WBC 29 /HPF   Sq Epi: x / Non Sq Epi: x / Bacteria: Negative              Records reviewed from prior hospitalization.  Labs reviewed remarkable for - leukocytosis to 13 with increased neutrophils on differential. Anemia with hgb at baseline.   EKG personally reviewed - NSR with possible Q waves in leads III, AVF (similar to prior EKG from May)  CXR personally reviewed - clear lungs  ------------------------------------------------------------------------------------------------------------------------------------------------  TTE 2023  CONCLUSIONS:  1. Mitral annular calcification, otherwise normal mitral  valve.  2. Calcified trileaflet aortic valve with normal opening.  Mild-moderate aortic regurgitation.  3. Normal left ventricular internal dimensions and wall  thicknesses.  4. Endocardium not well visualized; grossly normal left  ventricular systolic function. Endocardial visualization  enhanced with intravenous injection of echo contrast  (Definity).  5. The right ventricle is not well visualized; grossly  normal right ventricular systolic function. A device wire  is noted in the right heart.  ------------------------------------------------------------------------  Confirmed on  2023 - 17:51:37 by JACLYN Dudley  ------------------------------------------------------------------------

## 2023-09-11 NOTE — H&P ADULT - PROBLEM SELECTOR PLAN 1
Sepsis with clinical signs of pneumonia  - s/p vancomycin and zosyn in the ED  - will continue with zosyn for now  - obtain MRSA swab. If positive, consider restarting vancomycin   - lactate elevated to 2.5, s/p NS 1000  ml bolus   - repeat lactate level  - f/u respiratory viral panel  - f/u blood cultures

## 2023-09-11 NOTE — H&P ADULT - PROBLEM SELECTOR PLAN 3
- DVT ppx: lovenox  - GI ppx:   - Diet: soft and bite-sized  - Code status: DNR and DNI. Yes to IV fluids as needed, Yes to antibiotics as needed  - HCP: son, Zack Garcia  - previously completed MOLST form, HCP form, and living will are available for review in the patient's chart - continue home valsartan 80mg daily

## 2023-09-11 NOTE — ED ADULT NURSE NOTE - NSFALLHARMRISKINTERV_ED_ALL_ED
Assistance OOB with selected safe patient handling equipment if applicable/Communicate risk of Fall with Harm to all staff, patient, and family/Monitor gait and stability/Provide patient with walking aids/Provide visual cue: red socks, yellow wristband, yellow gown, etc/Reinforce activity limits and safety measures with patient and family/Bed in lowest position, wheels locked, appropriate side rails in place/Call bell, personal items and telephone in reach/Instruct patient to call for assistance before getting out of bed/chair/stretcher/Non-slip footwear applied when patient is off stretcher/[place] to call system/Physically safe environment - no spills, clutter or unnecessary equipment/Purposeful Proactive Rounding/Room/bathroom lighting operational, light cord in reach

## 2023-09-11 NOTE — ED PROVIDER NOTE - ATTENDING CONTRIBUTION TO CARE
89M w/ PMH of dementia, acute macular degeneration, MM lives at [facility] here w/ cough for 1 week and R sided cp, for 2 days, pt w/ fever upon arrival to the ER, pt reports "I have a breathing problem" while in the er pt found to be febrile tachycardic, normotensive, is awake and alert in no distress has b/l rhonchi, worse on the R side soft abdomen, no lower leg edema plan for labs, to be admitted for likely bacteria vs viral pna. covered w/ antibiotics broad spectrum,

## 2023-09-12 LAB
ANION GAP SERPL CALC-SCNC: 10 MMOL/L — SIGNIFICANT CHANGE UP (ref 5–17)
BASOPHILS # BLD AUTO: 0.03 K/UL — SIGNIFICANT CHANGE UP (ref 0–0.2)
BASOPHILS NFR BLD AUTO: 0.3 % — SIGNIFICANT CHANGE UP (ref 0–2)
BUN SERPL-MCNC: 25 MG/DL — HIGH (ref 7–23)
CALCIUM SERPL-MCNC: 7.8 MG/DL — LOW (ref 8.4–10.5)
CHLORIDE SERPL-SCNC: 106 MMOL/L — SIGNIFICANT CHANGE UP (ref 96–108)
CO2 SERPL-SCNC: 19 MMOL/L — LOW (ref 22–31)
CREAT SERPL-MCNC: 1.08 MG/DL — SIGNIFICANT CHANGE UP (ref 0.5–1.3)
EGFR: 65 ML/MIN/1.73M2 — SIGNIFICANT CHANGE UP
EOSINOPHIL # BLD AUTO: 0.19 K/UL — SIGNIFICANT CHANGE UP (ref 0–0.5)
EOSINOPHIL NFR BLD AUTO: 2.2 % — SIGNIFICANT CHANGE UP (ref 0–6)
GLUCOSE SERPL-MCNC: 99 MG/DL — SIGNIFICANT CHANGE UP (ref 70–99)
HCT VFR BLD CALC: 28.2 % — LOW (ref 39–50)
HGB BLD-MCNC: 9.1 G/DL — LOW (ref 13–17)
IMM GRANULOCYTES NFR BLD AUTO: 0.6 % — SIGNIFICANT CHANGE UP (ref 0–0.9)
LEGIONELLA AG UR QL: NEGATIVE — SIGNIFICANT CHANGE UP
LYMPHOCYTES # BLD AUTO: 1.11 K/UL — SIGNIFICANT CHANGE UP (ref 1–3.3)
LYMPHOCYTES # BLD AUTO: 12.8 % — LOW (ref 13–44)
MAGNESIUM SERPL-MCNC: 2.3 MG/DL — SIGNIFICANT CHANGE UP (ref 1.6–2.6)
MCHC RBC-ENTMCNC: 31.9 PG — SIGNIFICANT CHANGE UP (ref 27–34)
MCHC RBC-ENTMCNC: 32.3 GM/DL — SIGNIFICANT CHANGE UP (ref 32–36)
MCV RBC AUTO: 98.9 FL — SIGNIFICANT CHANGE UP (ref 80–100)
MONOCYTES # BLD AUTO: 0.75 K/UL — SIGNIFICANT CHANGE UP (ref 0–0.9)
MONOCYTES NFR BLD AUTO: 8.6 % — SIGNIFICANT CHANGE UP (ref 2–14)
MRSA PCR RESULT.: SIGNIFICANT CHANGE UP
NEUTROPHILS # BLD AUTO: 6.57 K/UL — SIGNIFICANT CHANGE UP (ref 1.8–7.4)
NEUTROPHILS NFR BLD AUTO: 75.5 % — SIGNIFICANT CHANGE UP (ref 43–77)
NRBC # BLD: 0 /100 WBCS — SIGNIFICANT CHANGE UP (ref 0–0)
PHOSPHATE SERPL-MCNC: 2.2 MG/DL — LOW (ref 2.5–4.5)
PLATELET # BLD AUTO: 253 K/UL — SIGNIFICANT CHANGE UP (ref 150–400)
POTASSIUM SERPL-MCNC: 4.1 MMOL/L — SIGNIFICANT CHANGE UP (ref 3.5–5.3)
POTASSIUM SERPL-SCNC: 4.1 MMOL/L — SIGNIFICANT CHANGE UP (ref 3.5–5.3)
PROCALCITONIN SERPL-MCNC: 0.19 NG/ML — HIGH (ref 0.02–0.1)
RBC # BLD: 2.85 M/UL — LOW (ref 4.2–5.8)
RBC # FLD: 14.4 % — SIGNIFICANT CHANGE UP (ref 10.3–14.5)
S AUREUS DNA NOSE QL NAA+PROBE: SIGNIFICANT CHANGE UP
SODIUM SERPL-SCNC: 135 MMOL/L — SIGNIFICANT CHANGE UP (ref 135–145)
WBC # BLD: 8.7 K/UL — SIGNIFICANT CHANGE UP (ref 3.8–10.5)
WBC # FLD AUTO: 8.7 K/UL — SIGNIFICANT CHANGE UP (ref 3.8–10.5)

## 2023-09-12 PROCEDURE — 99232 SBSQ HOSP IP/OBS MODERATE 35: CPT

## 2023-09-12 RX ADMIN — Medication 2 DROP(S): at 22:37

## 2023-09-12 RX ADMIN — Medication 3 MILLIGRAM(S): at 22:25

## 2023-09-12 RX ADMIN — PIPERACILLIN AND TAZOBACTAM 25 GRAM(S): 4; .5 INJECTION, POWDER, LYOPHILIZED, FOR SOLUTION INTRAVENOUS at 22:26

## 2023-09-12 RX ADMIN — PIPERACILLIN AND TAZOBACTAM 25 GRAM(S): 4; .5 INJECTION, POWDER, LYOPHILIZED, FOR SOLUTION INTRAVENOUS at 06:14

## 2023-09-12 RX ADMIN — Medication 1 TABLET(S): at 11:08

## 2023-09-12 RX ADMIN — VALACYCLOVIR 500 MILLIGRAM(S): 500 TABLET, FILM COATED ORAL at 09:10

## 2023-09-12 RX ADMIN — Medication 2000 UNIT(S): at 11:08

## 2023-09-12 RX ADMIN — Medication 2 DROP(S): at 13:32

## 2023-09-12 RX ADMIN — TAMSULOSIN HYDROCHLORIDE 0.4 MILLIGRAM(S): 0.4 CAPSULE ORAL at 22:25

## 2023-09-12 RX ADMIN — ENOXAPARIN SODIUM 40 MILLIGRAM(S): 100 INJECTION SUBCUTANEOUS at 22:25

## 2023-09-12 RX ADMIN — Medication 300 MILLIGRAM(S): at 11:09

## 2023-09-12 RX ADMIN — Medication 63.75 MILLIMOLE(S): at 11:56

## 2023-09-12 RX ADMIN — VALACYCLOVIR 500 MILLIGRAM(S): 500 TABLET, FILM COATED ORAL at 22:38

## 2023-09-12 RX ADMIN — VALSARTAN 80 MILLIGRAM(S): 80 TABLET ORAL at 06:13

## 2023-09-12 RX ADMIN — PIPERACILLIN AND TAZOBACTAM 25 GRAM(S): 4; .5 INJECTION, POWDER, LYOPHILIZED, FOR SOLUTION INTRAVENOUS at 13:32

## 2023-09-12 RX ADMIN — Medication 2 DROP(S): at 03:42

## 2023-09-12 RX ADMIN — Medication 2 DROP(S): at 06:13

## 2023-09-12 RX ADMIN — Medication 2 DROP(S): at 09:10

## 2023-09-12 RX ADMIN — Medication 2 DROP(S): at 17:49

## 2023-09-12 NOTE — DIETITIAN INITIAL EVALUATION ADULT - NSFNSNUTRHOMESUPPLEMENTFT_GEN_A_CORE
Per H&P, patient takes Multiple Vitamins and Cholecalciferol at home PTA. A also reports using [employer] "Formula 1 + PDM On The Go" protein packets to assist in protein intake at home.  Per H&P, patient takes Multiple Vitamins and Cholecalciferol at home PTA. A also reports using [employer] "Formula 1 + PDM On The Go" protein packets to assist in protein intake at home. Patient has also tried Ensure in the past.

## 2023-09-12 NOTE — PHYSICAL THERAPY INITIAL EVALUATION ADULT - TRANSFER TRAINING, PT EVAL
GOAL: Patient will perform sit to stand transfers independently at rolling walker with proper hand placement in 4 weeks

## 2023-09-12 NOTE — PHYSICAL THERAPY INITIAL EVALUATION ADULT - ADDITIONAL COMMENTS
Pt resides in [facility] assisted [facility] facility. Prior to admission, pt was able to walk with RW and assist. Has HHA 12 hours/day. Has RW, raised toilet seat, shower chair.

## 2023-09-12 NOTE — DIETITIAN INITIAL EVALUATION ADULT - ADD RECOMMEND
-Continue current diet: regular, defer texture/consistency to team. HHA prefers diet order to remain soft and bite sized rather than upgraded to regular texture. However, should PO intake decrease and/or patient experiences difficulty chewing or swallowing, then recommend swallow evaluation to determine proper diet texture/consistency.  -Recommend adding Ensure Plus High Protein x 1/day (provides 350 kcal, 20 g protein) to assist in meeting increased nutrient needs.  -Continue multivitamin and cholecalciferol for micronutrient supplementation pending no medical contraindications.   -Monitor PO intake, diet, weight, labs, skin, GI symptoms, and BM regularity.   -RD remains available upon request and will follow up per protocol.  -Continue current diet: regular, defer texture/consistency to team. HHA prefers diet order to remain soft and bite sized rather than upgraded to regular texture. However, should PO intake decrease and/or patient experiences difficulty chewing or swallowing, then recommend swallow evaluation to determine proper diet texture/consistency.  -Recommend adding Ensure Plus High Protein x 1/day (provides 350 kcal, 20 g protein) to assist in meeting increased nutrient needs.  -Continue multivitamin and cholecalciferol for micronutrient supplementation pending no medical contraindications; recommend adding vitamin C to promote wound healing.   -Monitor PO intake, diet, weight, labs, skin, GI symptoms, and BM regularity.   -RD remains available upon request and will follow up per protocol.

## 2023-09-12 NOTE — PROGRESS NOTE ADULT - PROBLEM SELECTOR PLAN 1
Sepsis with clinical signs of pneumonia  - s/p vancomycin and zosyn in the ED  - will continue with zosyn for now  - mrsa neg, legionella neg  - lactate elevated to 2.5, s/p NS 1000  ml bolus   - repeat lactate level wnl  - respiratory viral panel neg  - f/u blood cultures

## 2023-09-12 NOTE — DIETITIAN INITIAL EVALUATION ADULT - REASON INDICATOR FOR ASSESSMENT
RD consult warranted for: MST score 2 or >. Chart reviewed, events noted.  Source: medical record, patient, Home Health Aide (HHA) at bedside.

## 2023-09-12 NOTE — DIETITIAN INITIAL EVALUATION ADULT - PERTINENT MEDS FT
MEDICATIONS  (STANDING):  allopurinol 300 milliGRAM(s) Oral daily  cholecalciferol 2000 Unit(s) Oral daily  enoxaparin Injectable 40 milliGRAM(s) SubCutaneous every 24 hours  multivitamin 1 Tablet(s) Oral daily  piperacillin/tazobactam IVPB.. 3.375 Gram(s) IV Intermittent every 8 hours  tamsulosin 0.4 milliGRAM(s) Oral at bedtime  valACYclovir 500 milliGRAM(s) Oral two times a day  valsartan 80 milliGRAM(s) Oral daily    MEDICATIONS  (PRN):  aluminum hydroxide/magnesium hydroxide/simethicone Suspension 30 milliLiter(s) Oral every 4 hours PRN Dyspepsia  ondansetron Injectable 4 milliGRAM(s) IV Push every 8 hours PRN Nausea and/or Vomiting  polyethylene glycol 3350 17 Gram(s) Oral at bedtime PRN for constipation

## 2023-09-12 NOTE — DIETITIAN INITIAL EVALUATION ADULT - ENERGY INTAKE
Comments: Level 1 frost and redness. PT will consider increasing time or strength again at next visit if no improvement. Post-Care Instructions: I reviewed with the patient in detail post-care instructions. Patient should avoid sun exposure and wear sun protection. Price (Use Numbers Only, No Special Characters Or $): 50 Treatment Time (Optional): 1:45 Prep: The treated area was degreased with pre-peel cleanser, and vaseline was applied for protection of mucous membranes. Detail Level: Zone Treatment Number: 0 Chemical Peel: Lactic Acid 10% / TCA 20% Post Peel Care: After the procedure, patient rinsed face with water. Sun protection and post-care instructions were reviewed with the patient. Consent: Prior to the procedure, written consent was obtained and risks were reviewed, including but not limited to: redness, peeling, blistering, pigmentary change, scarring, infection, and pain. Fair (50-75%) Patient and HHA report good, unchanged appetite and PO intake since admit. Adequate (%)

## 2023-09-12 NOTE — DIETITIAN INITIAL EVALUATION ADULT - NSFNSGIIOFT_GEN_A_CORE
Patient reports no nausea/vomiting; no constipation, diarrhea yesterday that has subsided, HHA reports diarrhea occurs regularly at home for pt; last BM today per flowsheets; bowel regimen: miralax PRN.

## 2023-09-12 NOTE — PHYSICAL THERAPY INITIAL EVALUATION ADULT - PERTINENT HX OF CURRENT PROBLEM, REHAB EVAL
89 year old male with a PMHx of dementia (AAOx2-3 at baseline), acute macular degeneration of both eyes, multiple myeloma (no longer on chemotherapy) who presents from [facility] with sepsis secondary to pneumonia and acute bacterial conjunctivitis.  9/11: XR chest: Clear lungs.Sclerotic density on left lateral seventh rib which may represent healing   fracture.Redemonstrated numerous lytic osseous lucencies in keeping with patient's history of multiple myeloma.

## 2023-09-12 NOTE — DIETITIAN INITIAL EVALUATION ADULT - PERSON TAUGHT/METHOD
Encouraged protein-energy intake and keeping diet liberal./verbal instruction/patient instructed/caregiver Encouraged protein-energy intake and keeping diet liberal. Patient and HHA agreeable to receiving oral nutritional supplements. Flavor preferences noted./verbal instruction/patient instructed/caregiver

## 2023-09-12 NOTE — DIETITIAN INITIAL EVALUATION ADULT - ORAL INTAKE PTA/DIET HISTORY
Patient and HHA reports good appetite and PO intake PTA. HHA reports that portions served at [place] are large so sometimes patient is not hungry for lunch. Patient does not follow any dietary restrictions or texture-modified diets at [facility] living facility where patient resides. Confirms NKFA. Per HHA, patient was put on a pureed diet when hospitalized at the end of August despite no visible signs of needing texture-modified food. Per chart from this previous hospitalization, patient was seen for swallow evaluation with recommendations for regular solids and mildly thick liquids. Patient and HHA reports good appetite and PO intake PTA. HHA reports that portions served at [facility] are large so sometimes patient is not hungry for lunch. Patient does not follow any dietary restrictions or texture-modified diets at [facility] living facility where patient resides. Confirms NKFA. Per HHA, patient was put on a pureed diet when hospitalized at the end of August despite no visible signs of needing texture-modified food. Per chart from this previous hospitalization, patient was then seen for swallow evaluation with recommendations for regular solids and mildly thick liquids.

## 2023-09-12 NOTE — PHYSICAL THERAPY INITIAL EVALUATION ADULT - DISCHARGE PLANNER MADE AWARE
Patient for discharge this shift  Patient with wheelchair transport to home  02 tank to be sent from our facility per respiratory  Patient on continuous 02 at 2 liters and continues with SOB at rest and on exertion  Patient educated on safety and activity while at home  +2 edema remains to bilateral lower extremities  Lasix IV converted to PO for discharge per hospitalist  Patient transfers assist times one minimal assist  Will continue to monitor and follow plan of care  yes

## 2023-09-12 NOTE — DIETITIAN INITIAL EVALUATION ADULT - REASON FOR ADMISSION
Per chart: "89 year old male with a PMHx of dementia (AAOx2-3 at baseline), acute macular degeneration of both eyes, multiple myeloma (no longer on chemotherapy) who presents from [facility] with sepsis secondary to pneumonia and acute bacterial conjunctivitis."

## 2023-09-12 NOTE — DIETITIAN INITIAL EVALUATION ADULT - OTHER CALCULATIONS
Defer fluids needs to team  Estimated nutrient needs based on dosing weight 145lb, with consideration for advanced age and skin integrity.

## 2023-09-12 NOTE — DIETITIAN INITIAL EVALUATION ADULT - OTHER INFO
HHA reports pt's UBW 152lb, no recent significant changes in weight PTA.   Dosing weight: 145lb (9/11, bed)  Weight history per [facility]: 155.4lb (8/25/23), 153.4lb (7/11/23), 153.4lb (6/26/23), 147lb (6/21/23), 145lb (5/3/23), 143.5lb (3/29/23). - Weight appears to fluctuate within 143-155lb range over last ~6 months.    -Ordered for multivitamin and cholecalciferol; antibiotics.  -Patient is DNR/DNI; per H&P "The MOLST form also delineated wishes for no feed tube."  -Low phos from this AM noted; patient now s/p repletion, results pending.

## 2023-09-12 NOTE — DIETITIAN INITIAL EVALUATION ADULT - PERTINENT LABORATORY DATA
09-12   Na 135 mmol/L   Glu 99 mg/dL   K+ 4.1 mmol/L   Cr 1.08 mg/dL   BUN 25 mg/dL<H>   Phos 2.2 mg/dL<L>    Mg 2.3 mg/dL  eGFR 65  A1C: 5.4 % (08-22-23 @ 06:54)

## 2023-09-12 NOTE — DIETITIAN INITIAL EVALUATION ADULT - NSICDXPASTMEDICALHX_GEN_ALL_CORE_FT
PAST MEDICAL HISTORY:  Degeneration macular     Dyslipidemia     Forgetfulness     History of use of hearing aid in both ears     [place] (hard of hearing)     Hypertension     Multiple myeloma     Narcolepsy     Uses walker     Vision impairment

## 2023-09-13 LAB
ANION GAP SERPL CALC-SCNC: 13 MMOL/L — SIGNIFICANT CHANGE UP (ref 5–17)
BUN SERPL-MCNC: 21 MG/DL — SIGNIFICANT CHANGE UP (ref 7–23)
CALCIUM SERPL-MCNC: 8.1 MG/DL — LOW (ref 8.4–10.5)
CHLORIDE SERPL-SCNC: 108 MMOL/L — SIGNIFICANT CHANGE UP (ref 96–108)
CO2 SERPL-SCNC: 18 MMOL/L — LOW (ref 22–31)
CREAT SERPL-MCNC: 1.03 MG/DL — SIGNIFICANT CHANGE UP (ref 0.5–1.3)
EGFR: 69 ML/MIN/1.73M2 — SIGNIFICANT CHANGE UP
GLUCOSE SERPL-MCNC: 97 MG/DL — SIGNIFICANT CHANGE UP (ref 70–99)
HCT VFR BLD CALC: 29.1 % — LOW (ref 39–50)
HGB BLD-MCNC: 9.5 G/DL — LOW (ref 13–17)
MCHC RBC-ENTMCNC: 32.4 PG — SIGNIFICANT CHANGE UP (ref 27–34)
MCHC RBC-ENTMCNC: 32.6 GM/DL — SIGNIFICANT CHANGE UP (ref 32–36)
MCV RBC AUTO: 99.3 FL — SIGNIFICANT CHANGE UP (ref 80–100)
NRBC # BLD: 0 /100 WBCS — SIGNIFICANT CHANGE UP (ref 0–0)
PLATELET # BLD AUTO: 277 K/UL — SIGNIFICANT CHANGE UP (ref 150–400)
POTASSIUM SERPL-MCNC: 4.2 MMOL/L — SIGNIFICANT CHANGE UP (ref 3.5–5.3)
POTASSIUM SERPL-SCNC: 4.2 MMOL/L — SIGNIFICANT CHANGE UP (ref 3.5–5.3)
RBC # BLD: 2.93 M/UL — LOW (ref 4.2–5.8)
RBC # FLD: 14.6 % — HIGH (ref 10.3–14.5)
SODIUM SERPL-SCNC: 139 MMOL/L — SIGNIFICANT CHANGE UP (ref 135–145)
WBC # BLD: 6.04 K/UL — SIGNIFICANT CHANGE UP (ref 3.8–10.5)
WBC # FLD AUTO: 6.04 K/UL — SIGNIFICANT CHANGE UP (ref 3.8–10.5)

## 2023-09-13 PROCEDURE — 99232 SBSQ HOSP IP/OBS MODERATE 35: CPT

## 2023-09-13 RX ORDER — OFLOXACIN 0.3 %
2 DROPS OPHTHALMIC (EYE)
Qty: 1 | Refills: 0
Start: 2023-09-13 | End: 2023-09-17

## 2023-09-13 RX ORDER — SODIUM CHLORIDE 9 MG/ML
4 INJECTION INTRAMUSCULAR; INTRAVENOUS; SUBCUTANEOUS EVERY 12 HOURS
Refills: 0 | Status: COMPLETED | OUTPATIENT
Start: 2023-09-13 | End: 2023-09-14

## 2023-09-13 RX ADMIN — VALACYCLOVIR 500 MILLIGRAM(S): 500 TABLET, FILM COATED ORAL at 10:10

## 2023-09-13 RX ADMIN — PIPERACILLIN AND TAZOBACTAM 25 GRAM(S): 4; .5 INJECTION, POWDER, LYOPHILIZED, FOR SOLUTION INTRAVENOUS at 22:01

## 2023-09-13 RX ADMIN — Medication 2 DROP(S): at 13:26

## 2023-09-13 RX ADMIN — Medication 2 DROP(S): at 02:23

## 2023-09-13 RX ADMIN — Medication 300 MILLIGRAM(S): at 10:10

## 2023-09-13 RX ADMIN — SODIUM CHLORIDE 4 MILLILITER(S): 9 INJECTION INTRAMUSCULAR; INTRAVENOUS; SUBCUTANEOUS at 17:06

## 2023-09-13 RX ADMIN — Medication 1 TABLET(S): at 10:10

## 2023-09-13 RX ADMIN — VALACYCLOVIR 500 MILLIGRAM(S): 500 TABLET, FILM COATED ORAL at 22:02

## 2023-09-13 RX ADMIN — VALSARTAN 80 MILLIGRAM(S): 80 TABLET ORAL at 05:21

## 2023-09-13 RX ADMIN — ENOXAPARIN SODIUM 40 MILLIGRAM(S): 100 INJECTION SUBCUTANEOUS at 22:02

## 2023-09-13 RX ADMIN — TAMSULOSIN HYDROCHLORIDE 0.4 MILLIGRAM(S): 0.4 CAPSULE ORAL at 22:02

## 2023-09-13 RX ADMIN — Medication 2 DROP(S): at 10:11

## 2023-09-13 RX ADMIN — Medication 2 DROP(S): at 05:22

## 2023-09-13 RX ADMIN — Medication 2 DROP(S): at 22:02

## 2023-09-13 RX ADMIN — PIPERACILLIN AND TAZOBACTAM 25 GRAM(S): 4; .5 INJECTION, POWDER, LYOPHILIZED, FOR SOLUTION INTRAVENOUS at 13:23

## 2023-09-13 RX ADMIN — PIPERACILLIN AND TAZOBACTAM 25 GRAM(S): 4; .5 INJECTION, POWDER, LYOPHILIZED, FOR SOLUTION INTRAVENOUS at 05:22

## 2023-09-13 RX ADMIN — Medication 2000 UNIT(S): at 10:09

## 2023-09-13 NOTE — PROGRESS NOTE ADULT - PROBLEM SELECTOR PLAN 1
Sepsis with clinical signs of pneumonia  - s/p vancomycin and zosyn in the ED  - will continue with zosyn for now, switch to augmentin on dc   - mrsa neg, legionella neg  - lactate elevated to 2.5, s/p NS 1000  ml bolus   - repeat lactate level wnl  - respiratory viral panel neg  - blood cultures NGTD actual

## 2023-09-13 NOTE — PROGRESS NOTE ADULT - PROBLEM SELECTOR PLAN 2
Bacterial conjunctivitis affecting the left eye  - continue ofloxacin gtt to the left eye, 2 drops every 4 hours x2 days, followed by 2 drops every 6 hours for 5 days
Bacterial conjunctivitis affecting the left eye  - continue ofloxacin gtt to the left eye, 2 drops every 4 hours x2 days, followed by 2 drops every 6 hours for 5 days

## 2023-09-13 NOTE — PROGRESS NOTE ADULT - ASSESSMENT
89 year old male with a PMHx of dementia (AAOx2-3 at baseline), acute macular degeneration of both eyes, multiple myeloma (no longer on chemotherapy) who presents from [facility] with sepsis secondary to pneumonia and acute bacterial conjunctivitis.
89 year old male with a PMHx of dementia (AAOx2-3 at baseline), acute macular degeneration of both eyes, multiple myeloma (no longer on chemotherapy) who presents from [facility] with sepsis secondary to pneumonia and acute bacterial conjunctivitis.

## 2023-09-13 NOTE — PROGRESS NOTE ADULT - PROBLEM SELECTOR PROBLEM 3
What Is The Reason For Today's Visit?: Annual Full Body Skin Examination with No Concerns
What Is The Reason For Today's Visit? (Being Monitored For X): concerning skin lesions on a periodic basis
How Severe Are Your Spot(S)?: mild
Hypertension
Hypertension

## 2023-09-13 NOTE — PROGRESS NOTE ADULT - SUBJECTIVE AND OBJECTIVE BOX
PROGRESS NOTE:   Authored by Dr. Silver Bello MD, Available on MS Teams    Patient is a 89y old  Male who presents with a chief complaint of sepsis secondary to pneumonia (11 Sep 2023 15:55)      SUBJECTIVE / OVERNIGHT EVENTS: Patient feels okay. No chest pain or shortness of breath.     ADDITIONAL REVIEW OF SYSTEMS:    MEDICATIONS  (STANDING):  allopurinol 300 milliGRAM(s) Oral daily  cholecalciferol 2000 Unit(s) Oral daily  enoxaparin Injectable 40 milliGRAM(s) SubCutaneous every 24 hours  multivitamin 1 Tablet(s) Oral daily  ofloxacin 0.3% Solution 2 Drop(s) Left EYE every 4 hours  piperacillin/tazobactam IVPB.. 3.375 Gram(s) IV Intermittent every 8 hours  tamsulosin 0.4 milliGRAM(s) Oral at bedtime  valACYclovir 500 milliGRAM(s) Oral two times a day  valsartan 80 milliGRAM(s) Oral daily    MEDICATIONS  (PRN):  acetaminophen     Tablet .. 650 milliGRAM(s) Oral every 6 hours PRN Temp greater or equal to 38C (100.4F), Mild Pain (1 - 3)  aluminum hydroxide/magnesium hydroxide/simethicone Suspension 30 milliLiter(s) Oral every 4 hours PRN Dyspepsia  melatonin 3 milliGRAM(s) Oral at bedtime PRN Insomnia  ondansetron Injectable 4 milliGRAM(s) IV Push every 8 hours PRN Nausea and/or Vomiting  polyethylene glycol 3350 17 Gram(s) Oral at bedtime PRN for constipation      CAPILLARY BLOOD GLUCOSE        I&O's Summary    11 Sep 2023 07:01  -  12 Sep 2023 07:00  --------------------------------------------------------  IN: 0 mL / OUT: 550 mL / NET: -550 mL    12 Sep 2023 07:01  -  12 Sep 2023 13:12  --------------------------------------------------------  IN: 0 mL / OUT: 100 mL / NET: -100 mL        PHYSICAL EXAM:  Vital Signs Last 24 Hrs  T(C): 36.5 (12 Sep 2023 10:53), Max: 38.8 (11 Sep 2023 13:22)  T(F): 97.7 (12 Sep 2023 10:53), Max: 101.9 (11 Sep 2023 13:22)  HR: 73 (12 Sep 2023 10:53) (60 - 73)  BP: 102/57 (12 Sep 2023 10:53) (102/57 - 154/83)  BP(mean): --  RR: 16 (12 Sep 2023 10:53) (16 - 19)  SpO2: 96% (12 Sep 2023 10:53) (94% - 96%)    Parameters below as of 12 Sep 2023 10:53  Patient On (Oxygen Delivery Method): room air        CONSTITUTIONAL: NAD  RESPIRATORY: Normal respiratory effort; lungs are clear to auscultation bilaterally  CARDIOVASCULAR: Regular rate and rhythm, normal S1 and S2, no murmur/rub/gallop; No lower extremity edema  ABDOMEN: Nontender to palpation, normoactive bowel sounds, no rebound/guarding  MUSCLOSKELETAL: no clubbing or cyanosis of digits; no joint swelling or tenderness to palpation  PSYCH: A+O to person, place, and time; affect appropriate    LABS:                        9.1    8.70  )-----------( 253      ( 12 Sep 2023 07:13 )             28.2     09-12    135  |  106  |  25<H>  ----------------------------<  99  4.1   |  19<L>  |  1.08    Ca    7.8<L>      12 Sep 2023 07:10  Phos  2.2     09-12  Mg     2.3     09-12    TPro  7.0  /  Alb  3.7  /  TBili  0.5  /  DBili  x   /  AST  14  /  ALT  17  /  AlkPhos  117  09-11    PT/INR - ( 11 Sep 2023 13:26 )   PT: 13.5 sec;   INR: 1.23 ratio         PTT - ( 11 Sep 2023 13:26 )  PTT:32.8 sec      Urinalysis Basic - ( 12 Sep 2023 07:10 )    Color: x / Appearance: x / SG: x / pH: x  Gluc: 99 mg/dL / Ketone: x  / Bili: x / Urobili: x   Blood: x / Protein: x / Nitrite: x   Leuk Esterase: x / RBC: x / WBC x   Sq Epi: x / Non Sq Epi: x / Bacteria: x  
PROGRESS NOTE:   Authored by Dr. Silver Bello MD, Available on MS Teams    Patient is a 89y old  Male who presents with a chief complaint of Per chart: "89 year old male with a PMHx of dementia (AAOx2-3 at baseline), acute macular degeneration of both eyes, multiple myeloma (no longer on chemotherapy) who presents from [facility] with sepsis secondary to pneumonia and acute bacterial conjunctivitis."     (12 Sep 2023 14:21)      SUBJECTIVE / OVERNIGHT EVENTS: Patient has a cough, unable to bring up sputum    ADDITIONAL REVIEW OF SYSTEMS:    MEDICATIONS  (STANDING):  allopurinol 300 milliGRAM(s) Oral daily  cholecalciferol 2000 Unit(s) Oral daily  enoxaparin Injectable 40 milliGRAM(s) SubCutaneous every 24 hours  multivitamin 1 Tablet(s) Oral daily  ofloxacin 0.3% Solution 2 Drop(s) Left EYE every 6 hours  piperacillin/tazobactam IVPB.. 3.375 Gram(s) IV Intermittent every 8 hours  sodium chloride 3%  Inhalation 4 milliLiter(s) Inhalation every 12 hours  tamsulosin 0.4 milliGRAM(s) Oral at bedtime  valACYclovir 500 milliGRAM(s) Oral two times a day  valsartan 80 milliGRAM(s) Oral daily    MEDICATIONS  (PRN):  acetaminophen     Tablet .. 650 milliGRAM(s) Oral every 6 hours PRN Temp greater or equal to 38C (100.4F), Mild Pain (1 - 3)  aluminum hydroxide/magnesium hydroxide/simethicone Suspension 30 milliLiter(s) Oral every 4 hours PRN Dyspepsia  melatonin 3 milliGRAM(s) Oral at bedtime PRN Insomnia  ondansetron Injectable 4 milliGRAM(s) IV Push every 8 hours PRN Nausea and/or Vomiting  polyethylene glycol 3350 17 Gram(s) Oral at bedtime PRN for constipation      CAPILLARY BLOOD GLUCOSE        I&O's Summary    12 Sep 2023 07:01  -  13 Sep 2023 07:00  --------------------------------------------------------  IN: 600 mL / OUT: 800 mL / NET: -200 mL    13 Sep 2023 07:01  -  13 Sep 2023 14:01  --------------------------------------------------------  IN: 100 mL / OUT: 200 mL / NET: -100 mL        PHYSICAL EXAM:  Vital Signs Last 24 Hrs  T(C): 36.4 (13 Sep 2023 12:22), Max: 36.9 (12 Sep 2023 21:00)  T(F): 97.6 (13 Sep 2023 12:22), Max: 98.4 (12 Sep 2023 21:00)  HR: 60 (13 Sep 2023 12:22) (60 - 68)  BP: 107/63 (13 Sep 2023 12:22) (107/63 - 137/68)  BP(mean): --  RR: 18 (13 Sep 2023 12:22) (17 - 18)  SpO2: 96% (13 Sep 2023 12:22) (96% - 97%)    Parameters below as of 13 Sep 2023 12:22  Patient On (Oxygen Delivery Method): room air        CONSTITUTIONAL: NAD  RESPIRATORY: Normal respiratory effort; lungs are clear to auscultation bilaterally  CARDIOVASCULAR: Regular rate and rhythm, normal S1 and S2, no murmur/rub/gallop; No lower extremity edema  ABDOMEN: Nontender to palpation, normoactive bowel sounds, no rebound/guarding  MUSCLOSKELETAL: no clubbing or cyanosis of digits; no joint swelling or tenderness to palpation  PSYCH: A+O to person, place, and time; affect appropriate    LABS:                        9.5    6.04  )-----------( 277      ( 13 Sep 2023 07:30 )             29.1     09-13    139  |  108  |  21  ----------------------------<  97  4.2   |  18<L>  |  1.03    Ca    8.1<L>      13 Sep 2023 07:28  Phos  2.2     09-12  Mg     2.3     09-12            Urinalysis Basic - ( 13 Sep 2023 07:28 )    Color: x / Appearance: x / SG: x / pH: x  Gluc: 97 mg/dL / Ketone: x  / Bili: x / Urobili: x   Blood: x / Protein: x / Nitrite: x   Leuk Esterase: x / RBC: x / WBC x   Sq Epi: x / Non Sq Epi: x / Bacteria: x        Culture - Urine (collected 11 Sep 2023 15:20)  Source: Clean Catch Clean Catch (Midstream)  Preliminary Report (13 Sep 2023 11:57):    >100,000 CFU/ml Enterococcus species    Culture - Blood (collected 11 Sep 2023 13:15)  Source: .Blood Blood-Peripheral  Preliminary Report (12 Sep 2023 18:02):    No growth at 24 hours    Culture - Blood (collected 11 Sep 2023 13:00)  Source: .Blood Blood-Peripheral  Preliminary Report (12 Sep 2023 18:02):    No growth at 24 hours        RADIOLOGY & ADDITIONAL TESTS:  Results Reviewed:   Imaging Personally Reviewed:  Electrocardiogram Personally Reviewed:    COORDINATION OF CARE:  Care Discussed with Consultants/Other Providers [Y/N]:  Prior or Outpatient Records Reviewed [Y/N]:

## 2023-09-13 NOTE — PROGRESS NOTE ADULT - PROBLEM SELECTOR PLAN 6
- DVT ppx: lovenox  - GI ppx: none  - Antiviral: home valacyclovir 500mg BID  - Diet: soft and bite-sized  - Code status: DNR and DNI. Yes to IV fluids as needed, Yes to antibiotics as needed  - HCP: sonZack  - previously completed MOLST form, HCP form, and living will are available for review in the patient's chart
- DVT ppx: lovenox  - GI ppx: none  - Antiviral: home valacyclovir 500mg BID  - Diet: soft and bite-sized  - Code status: DNR and DNI. Yes to IV fluids as needed, Yes to antibiotics as needed  - HCP: sonZack  - previously completed MOLST form, HCP form, and living will are available for review in the patient's chart

## 2023-09-14 ENCOUNTER — TRANSCRIPTION ENCOUNTER (OUTPATIENT)
Age: 88
End: 2023-09-14

## 2023-09-14 VITALS
OXYGEN SATURATION: 95 % | TEMPERATURE: 98 F | SYSTOLIC BLOOD PRESSURE: 120 MMHG | HEART RATE: 67 BPM | DIASTOLIC BLOOD PRESSURE: 62 MMHG | RESPIRATION RATE: 18 BRPM

## 2023-09-14 LAB
-  AMPICILLIN: SIGNIFICANT CHANGE UP
-  CIPROFLOXACIN: SIGNIFICANT CHANGE UP
-  LEVOFLOXACIN: SIGNIFICANT CHANGE UP
-  NITROFURANTOIN: SIGNIFICANT CHANGE UP
-  TETRACYCLINE: SIGNIFICANT CHANGE UP
-  VANCOMYCIN: SIGNIFICANT CHANGE UP
ANION GAP SERPL CALC-SCNC: 13 MMOL/L — SIGNIFICANT CHANGE UP (ref 5–17)
BUN SERPL-MCNC: 19 MG/DL — SIGNIFICANT CHANGE UP (ref 7–23)
CALCIUM SERPL-MCNC: 9 MG/DL — SIGNIFICANT CHANGE UP (ref 8.4–10.5)
CHLORIDE SERPL-SCNC: 108 MMOL/L — SIGNIFICANT CHANGE UP (ref 96–108)
CO2 SERPL-SCNC: 17 MMOL/L — LOW (ref 22–31)
CREAT SERPL-MCNC: 0.97 MG/DL — SIGNIFICANT CHANGE UP (ref 0.5–1.3)
CULTURE RESULTS: SIGNIFICANT CHANGE UP
EGFR: 74 ML/MIN/1.73M2 — SIGNIFICANT CHANGE UP
GLUCOSE SERPL-MCNC: 96 MG/DL — SIGNIFICANT CHANGE UP (ref 70–99)
HCT VFR BLD CALC: 31.8 % — LOW (ref 39–50)
HGB BLD-MCNC: 10.2 G/DL — LOW (ref 13–17)
MCHC RBC-ENTMCNC: 31.8 PG — SIGNIFICANT CHANGE UP (ref 27–34)
MCHC RBC-ENTMCNC: 32.1 GM/DL — SIGNIFICANT CHANGE UP (ref 32–36)
MCV RBC AUTO: 99.1 FL — SIGNIFICANT CHANGE UP (ref 80–100)
METHOD TYPE: SIGNIFICANT CHANGE UP
NRBC # BLD: 0 /100 WBCS — SIGNIFICANT CHANGE UP (ref 0–0)
ORGANISM # SPEC MICROSCOPIC CNT: SIGNIFICANT CHANGE UP
ORGANISM # SPEC MICROSCOPIC CNT: SIGNIFICANT CHANGE UP
PLATELET # BLD AUTO: 299 K/UL — SIGNIFICANT CHANGE UP (ref 150–400)
POTASSIUM SERPL-MCNC: 4.6 MMOL/L — SIGNIFICANT CHANGE UP (ref 3.5–5.3)
POTASSIUM SERPL-SCNC: 4.6 MMOL/L — SIGNIFICANT CHANGE UP (ref 3.5–5.3)
RBC # BLD: 3.21 M/UL — LOW (ref 4.2–5.8)
RBC # FLD: 14.5 % — SIGNIFICANT CHANGE UP (ref 10.3–14.5)
SODIUM SERPL-SCNC: 138 MMOL/L — SIGNIFICANT CHANGE UP (ref 135–145)
SPECIMEN SOURCE: SIGNIFICANT CHANGE UP
WBC # BLD: 5.86 K/UL — SIGNIFICANT CHANGE UP (ref 3.8–10.5)
WBC # FLD AUTO: 5.86 K/UL — SIGNIFICANT CHANGE UP (ref 3.8–10.5)

## 2023-09-14 PROCEDURE — 99239 HOSP IP/OBS DSCHRG MGMT >30: CPT

## 2023-09-14 RX ORDER — VALSARTAN 80 MG/1
1 TABLET ORAL
Qty: 0 | Refills: 0 | DISCHARGE

## 2023-09-14 RX ORDER — CHOLECALCIFEROL (VITAMIN D3) 125 MCG
1 CAPSULE ORAL
Qty: 0 | Refills: 0 | DISCHARGE

## 2023-09-14 RX ORDER — VALACYCLOVIR 500 MG/1
1 TABLET, FILM COATED ORAL
Qty: 0 | Refills: 0 | DISCHARGE

## 2023-09-14 RX ORDER — TAMSULOSIN HYDROCHLORIDE 0.4 MG/1
1 CAPSULE ORAL
Qty: 0 | Refills: 0 | DISCHARGE

## 2023-09-14 RX ORDER — ONDANSETRON 8 MG/1
1 TABLET, FILM COATED ORAL
Refills: 0 | DISCHARGE

## 2023-09-14 RX ORDER — DULOXETINE HYDROCHLORIDE 30 MG/1
1 CAPSULE, DELAYED RELEASE ORAL
Refills: 0 | DISCHARGE

## 2023-09-14 RX ORDER — DOCUSATE SODIUM 100 MG
1 CAPSULE ORAL
Refills: 0 | DISCHARGE

## 2023-09-14 RX ORDER — LANOLIN ALCOHOL/MO/W.PET/CERES
1 CREAM (GRAM) TOPICAL
Qty: 0 | Refills: 0 | DISCHARGE

## 2023-09-14 RX ADMIN — Medication 2 DROP(S): at 08:30

## 2023-09-14 RX ADMIN — VALSARTAN 80 MILLIGRAM(S): 80 TABLET ORAL at 06:04

## 2023-09-14 RX ADMIN — VALACYCLOVIR 500 MILLIGRAM(S): 500 TABLET, FILM COATED ORAL at 10:07

## 2023-09-14 RX ADMIN — SODIUM CHLORIDE 4 MILLILITER(S): 9 INJECTION INTRAMUSCULAR; INTRAVENOUS; SUBCUTANEOUS at 06:04

## 2023-09-14 RX ADMIN — PIPERACILLIN AND TAZOBACTAM 25 GRAM(S): 4; .5 INJECTION, POWDER, LYOPHILIZED, FOR SOLUTION INTRAVENOUS at 06:04

## 2023-09-14 NOTE — DISCHARGE NOTE PROVIDER - NSDCFUADDAPPT_GEN_ALL_CORE_FT
APPTS ARE READY TO BE MADE: [x ] YES    Best Family or Patient Contact (if needed):    Additional Information about above appointments (if needed):    1: PCP  2:   3:     Other comments or requests:    APPTS ARE READY TO BE MADE: [x ] YES    Best Family or Patient Contact (if needed):    Additional Information about above appointments (if needed):    1: PCP  2:   3:     Other comments or requests:     care home   confirmed patient is on the schedule for their rounding on site primary care physician on Thursday 9/21

## 2023-09-14 NOTE — DISCHARGE NOTE NURSING/CASE MANAGEMENT/SOCIAL WORK - NSDCFUADDAPPT_GEN_ALL_CORE_FT
APPTS ARE READY TO BE MADE: [x ] YES    Best Family or Patient Contact (if needed):    Additional Information about above appointments (if needed):    1: PCP  2:   3:     Other comments or requests:

## 2023-09-14 NOTE — DISCHARGE NOTE PROVIDER - NSDCMRMEDTOKEN_GEN_ALL_CORE_FT
acetaminophen 325 mg oral tablet: 2 tab(s) orally every 6 hours As needed Mild Pain (1 - 3), Moderate Pain (4 - 6)  allopurinol 300 mg oral tablet: 1 tab(s) orally once a day  cholecalciferol 50 mcg (2000 intl units) oral tablet: 1 tab(s) orally once a day  docusate sodium 100 mg oral capsule: 1 cap(s) orally once a day (at bedtime)  DULoxetine 30 mg oral delayed release capsule: 1 cap(s) orally once a day  Melatonin 5 mg oral tablet: 1 tab(s) orally once a day (at bedtime) as needed for  insomnia  Multiple Vitamins oral tablet: 1 tab(s) orally once a day  ofloxacin 0.3% ophthalmic solution: 2 drop(s) to each affected eye every 6 hours Take through September 18, 2023 and then discontinue  ondansetron 8 mg oral tablet: 1 tab(s) orally every 8 hours as needed  polyethylene glycol 3350 oral powder for reconstitution: 17 orally once a day (at bedtime) as needed for  constipation  tamsulosin 0.4 mg oral capsule: 1 cap(s) orally once a day  valACYclovir 500 mg oral tablet: 1 tab(s) orally 2 times a day  valsartan 80 mg oral tablet: 1 tab(s) orally once a day   acetaminophen 325 mg oral tablet: 2 tab(s) orally every 6 hours as needed for Mild Pain (1 - 3), Moderate Pain (4 - 6)  allopurinol 300 mg oral tablet: 1 tab(s) orally once a day  amoxicillin-clavulanate 875 mg-125 mg oral tablet: 1 tab(s) orally every 12 hours Take through September 15, 2023 and then discontinue  cholecalciferol 50 mcg (2000 intl units) oral tablet: 1 tab(s) orally once a day  Melatonin 5 mg oral tablet: 1 tab(s) orally once a day (at bedtime) as needed for  insomnia  Multiple Vitamins oral tablet: 1 tab(s) orally once a day  ofloxacin 0.3% ophthalmic solution: 2 drop(s) to each affected eye every 6 hours Take through September 18, 2023 and then discontinue  polyethylene glycol 3350 oral powder for reconstitution: 17 powder for reconstitution orally once a day (at bedtime) as needed for  constipation  tamsulosin 0.4 mg oral capsule: 1 cap(s) orally once a day  valACYclovir 500 mg oral tablet: 1 tab(s) orally 2 times a day  valsartan 80 mg oral tablet: 1 tab(s) orally once a day

## 2023-09-14 NOTE — DISCHARGE NOTE PROVIDER - CARE PROVIDER_API CALL
Bryce Khalil  Internal Medicine  [address] [place] [address]  Phone: [phone]  Fax: [phone]  Follow Up Time:

## 2023-09-14 NOTE — DISCHARGE NOTE PROVIDER - NSDCCPCAREPLAN_GEN_ALL_CORE_FT
PRINCIPAL DISCHARGE DIAGNOSIS  Diagnosis: PNA (pneumonia)  Assessment and Plan of Treatment: You came in with cough and were treated for pneumonia. Please continue antibiotics as prescribed for one  more day until 9/15/23. If you have a fever or worsening cough, please see a doctor right away.      SECONDARY DISCHARGE DIAGNOSES  Diagnosis: Hypertension  Assessment and Plan of Treatment: Please take your medications as prescribed and follow up with your pcp    Diagnosis: Bacterial conjunctivitis  Assessment and Plan of Treatment: Please take the eye drops as prescribed for your eye infection. continue ofloxacin gtt to the left eye, 2 drops every 4 hours x2 days, followed by 2 drops every 6 hours for 5 days.

## 2023-09-14 NOTE — DISCHARGE NOTE NURSING/CASE MANAGEMENT/SOCIAL WORK - PATIENT PORTAL LINK FT
You can access the FollowMyHealth Patient Portal offered by [facility] by registering at the following website: [email or web address]. By joining [email or web address]’s FollowMyHealth portal, you will also be able to view your health information using other applications (apps) compatible with our system.

## 2023-09-14 NOTE — DISCHARGE NOTE PROVIDER - ATTENDING DISCHARGE PHYSICAL EXAMINATION:
PHYSICAL EXAM:    Vital Signs Last 24 Hrs  T(C): 36.8 (14 Sep 2023 05:23), Max: 36.8 (14 Sep 2023 05:23)  T(F): 98.2 (14 Sep 2023 05:23), Max: 98.2 (14 Sep 2023 05:23)  HR: 62 (14 Sep 2023 05:23) (60 - 68)  BP: 168/75 (14 Sep 2023 05:23) (107/63 - 168/75)  BP(mean): --  RR: 18 (14 Sep 2023 05:23) (18 - 18)  SpO2: 98% (14 Sep 2023 05:23) (96% - 98%)    General: No acute distress.  HEENT:  No scleral icterus or injection.  Neck: Supple.  Full ROM.  No JVD.    Heart: RRR.  Normal S1 and S2.    Lungs: CTAB. No wheezes, crackles, or rhonchi.    Abdomen: BS+, soft, NT/ND.  Skin: Warm and dry.  No rashes.  Extremities: No edema, clubbing, or cyanosis.  Musculoskeletal: No deformities.   Neuro: A&Ox3. Moving all extremities

## 2023-09-14 NOTE — DISCHARGE NOTE PROVIDER - NSDCFUSCHEDAPPT_GEN_ALL_CORE_FT
[facility] Physician [facility]  [facility]  Scheduled Appointment: 09/18/2023    Janessa Mcintyre  [facility] Physician [facility]  [facility]  Scheduled Appointment: 09/18/2023    Beatriz Ordonez  [facility] Physician [facility]  [address]  Scheduled Appointment: 12/07/2023     [facility] Physician [facility]  [facility]  Scheduled Appointment: 09/18/2023    Janessa Mcintyre  [facility] Physician [facility]  [facility]  Scheduled Appointment: 09/18/2023    Beatriz Ordonez  [facility] Physician [address]  Scheduled Appointment: 12/07/2023

## 2023-09-14 NOTE — DISCHARGE NOTE NURSING/CASE MANAGEMENT/SOCIAL WORK - NSDCPEFALRISK_GEN_ALL_CORE
For information on Fall & Injury Prevention, visit: [email or web address] OR  [email or web address] OR  https://www.cdc.gov/steadi/patient.html

## 2023-09-14 NOTE — DISCHARGE NOTE PROVIDER - HOSPITAL COURSE
89 year old male with a PMHx of dementia (AAOx2-3 at baseline), acute macular degeneration of both eyes, multiple myeloma (no longer on chemotherapy) who presents from [facility] for cough. The patient was interviewed with his home health aide at bedside. The patient developed a cough about one week ago. Nonproductive and not associated with any subjective fevers/chills. Pt himself did not endorse chest pain, but per his home health aide, the patient had complained of right sided chest pain, worse with inspiration. Home health aide also felt the patient was more confused than his normal self, as he took a minute to recognize his home health aide who has worked for him the past several months. She also noted crusting about his left eye This is not typical for him. Otherwise, no shortness of breath, no abdominal pain, no lower extremity edema.     Problem: Sepsis due to pneumonia.   ·  Plan: Sepsis with clinical signs of pneumonia  - s/p vancomycin and zosyn in the ED  - will continue with zosyn for now, switch to augmentin on dc   - mrsa neg, legionella neg  - lactate elevated to 2.5, s/p NS 1000  ml bolus   - repeat lactate level wnl  - respiratory viral panel neg  - blood cultures NGTD.     Problem/Plan - 2:  ·  Problem: Bacterial conjunctivitis.   ·  Plan: Bacterial conjunctivitis affecting the left eye  - continue ofloxacin gtt to the left eye, 2 drops every 4 hours x2 days, followed by 2 drops every 6 hours for 5 days.

## 2023-09-15 ENCOUNTER — TRANSCRIPTION ENCOUNTER (OUTPATIENT)
Age: 88
End: 2023-09-15

## 2023-09-16 LAB
CULTURE RESULTS: SIGNIFICANT CHANGE UP
CULTURE RESULTS: SIGNIFICANT CHANGE UP
SPECIMEN SOURCE: SIGNIFICANT CHANGE UP
SPECIMEN SOURCE: SIGNIFICANT CHANGE UP

## 2023-09-18 ENCOUNTER — APPOINTMENT (OUTPATIENT)
Dept: HEMATOLOGY ONCOLOGY | Facility: CLINIC | Age: 88
End: 2023-09-18

## 2023-09-18 ENCOUNTER — APPOINTMENT (OUTPATIENT)
Dept: INFUSION THERAPY | Facility: HOSPITAL | Age: 88
End: 2023-09-18

## 2023-09-22 ENCOUNTER — APPOINTMENT (OUTPATIENT)
Dept: CARE COORDINATION | Facility: HOME HEALTH | Age: 88
End: 2023-09-22
Payer: MEDICARE

## 2023-09-22 VITALS
RESPIRATION RATE: 16 BRPM | DIASTOLIC BLOOD PRESSURE: 70 MMHG | OXYGEN SATURATION: 95 % | HEART RATE: 60 BPM | TEMPERATURE: 97.1 F | SYSTOLIC BLOOD PRESSURE: 122 MMHG

## 2023-09-22 DIAGNOSIS — N40.0 BENIGN PROSTATIC HYPERPLASIA WITHOUT LOWER URINARY TRACT SYMPMS: ICD-10-CM

## 2023-09-22 DIAGNOSIS — I10 ESSENTIAL (PRIMARY) HYPERTENSION: ICD-10-CM

## 2023-09-22 DIAGNOSIS — Z87.01 PERSONAL HISTORY OF PNEUMONIA (RECURRENT): ICD-10-CM

## 2023-09-22 PROCEDURE — 99349 HOME/RES VST EST MOD MDM 40: CPT

## 2023-09-26 PROBLEM — Z87.01 HISTORY OF PNEUMONIA: Status: ACTIVE | Noted: 2023-09-22

## 2023-09-26 PROBLEM — I10 HYPERTENSION: Status: ACTIVE | Noted: 2023-06-21

## 2023-09-26 PROBLEM — N40.0 BPH (BENIGN PROSTATIC HYPERPLASIA): Status: ACTIVE | Noted: 2023-06-21

## 2023-09-26 RX ORDER — ONDANSETRON 4 MG/1
4 TABLET ORAL
Refills: 0 | Status: DISCONTINUED | COMMUNITY
Start: 2023-06-21 | End: 2023-09-26

## 2023-09-26 RX ORDER — VALSARTAN 80 MG/1
80 TABLET ORAL
Refills: 0 | Status: ACTIVE | COMMUNITY
Start: 2023-06-21

## 2023-09-26 RX ORDER — ALLOPURINOL 300 MG/1
300 TABLET ORAL DAILY
Refills: 0 | Status: DISCONTINUED | COMMUNITY
Start: 2023-06-21 | End: 2023-09-26

## 2023-09-26 RX ORDER — DOCUSATE SODIUM 100 MG
100 TABLET ORAL
Refills: 0 | Status: DISCONTINUED | COMMUNITY

## 2023-09-26 RX ORDER — CHLORHEXIDINE GLUCONATE 4 %
5 LIQUID (ML) TOPICAL
Refills: 0 | Status: ACTIVE | COMMUNITY

## 2023-09-26 RX ORDER — ACETAMINOPHEN 500 MG/1
500 TABLET ORAL
Refills: 0 | Status: ACTIVE | COMMUNITY

## 2023-09-26 RX ORDER — LENALIDOMIDE 10 MG/1
10 CAPSULE ORAL
Qty: 21 | Refills: 0 | Status: DISCONTINUED | COMMUNITY
Start: 2023-06-21 | End: 2023-09-26

## 2023-09-26 RX ORDER — VALACYCLOVIR 500 MG/1
500 TABLET, FILM COATED ORAL TWICE DAILY
Qty: 60 | Refills: 5 | Status: DISCONTINUED | COMMUNITY
Start: 2023-06-21 | End: 2023-09-26

## 2023-09-26 RX ORDER — DULOXETINE HYDROCHLORIDE 30 MG/1
30 CAPSULE, DELAYED RELEASE PELLETS ORAL DAILY
Qty: 30 | Refills: 1 | Status: DISCONTINUED | COMMUNITY
Start: 2023-07-31 | End: 2023-09-26

## 2023-09-26 RX ORDER — MULTIVIT-MIN/IRON/FOLIC ACID/K 18-600-40
50 MCG CAPSULE ORAL
Qty: 30 | Refills: 5 | Status: DISCONTINUED | COMMUNITY
Start: 2023-06-21 | End: 2023-09-26

## 2023-09-26 RX ORDER — ASPIRIN 81 MG/1
81 TABLET, DELAYED RELEASE ORAL DAILY
Qty: 30 | Refills: 5 | Status: DISCONTINUED | COMMUNITY
Start: 2023-06-21 | End: 2023-09-26

## 2023-10-02 ENCOUNTER — APPOINTMENT (OUTPATIENT)
Dept: INFUSION THERAPY | Facility: HOSPITAL | Age: 88
End: 2023-10-02

## 2023-10-02 ENCOUNTER — APPOINTMENT (OUTPATIENT)
Dept: HEMATOLOGY ONCOLOGY | Facility: CLINIC | Age: 88
End: 2023-10-02

## 2023-10-03 ENCOUNTER — TRANSCRIPTION ENCOUNTER (OUTPATIENT)
Age: 88
End: 2023-10-03

## 2023-10-10 ENCOUNTER — TRANSCRIPTION ENCOUNTER (OUTPATIENT)
Age: 88
End: 2023-10-10

## 2023-11-13 PROCEDURE — 83735 ASSAY OF MAGNESIUM: CPT

## 2023-11-13 PROCEDURE — 82803 BLOOD GASES ANY COMBINATION: CPT

## 2023-11-13 PROCEDURE — 36415 COLL VENOUS BLD VENIPUNCTURE: CPT

## 2023-11-13 PROCEDURE — 97161 PT EVAL LOW COMPLEX 20 MIN: CPT

## 2023-11-13 PROCEDURE — 83880 ASSAY OF NATRIURETIC PEPTIDE: CPT

## 2023-11-13 PROCEDURE — 87640 STAPH A DNA AMP PROBE: CPT

## 2023-11-13 PROCEDURE — 80053 COMPREHEN METABOLIC PANEL: CPT

## 2023-11-13 PROCEDURE — 82947 ASSAY GLUCOSE BLOOD QUANT: CPT

## 2023-11-13 PROCEDURE — 0225U NFCT DS DNA&RNA 21 SARSCOV2: CPT

## 2023-11-13 PROCEDURE — 84100 ASSAY OF PHOSPHORUS: CPT

## 2023-11-13 PROCEDURE — 87641 MR-STAPH DNA AMP PROBE: CPT

## 2023-11-13 PROCEDURE — 80048 BASIC METABOLIC PNL TOTAL CA: CPT

## 2023-11-13 PROCEDURE — 83605 ASSAY OF LACTIC ACID: CPT

## 2023-11-13 PROCEDURE — 84484 ASSAY OF TROPONIN QUANT: CPT

## 2023-11-13 PROCEDURE — 96374 THER/PROPH/DIAG INJ IV PUSH: CPT

## 2023-11-13 PROCEDURE — 87186 SC STD MICRODIL/AGAR DIL: CPT

## 2023-11-13 PROCEDURE — 71045 X-RAY EXAM CHEST 1 VIEW: CPT

## 2023-11-13 PROCEDURE — 82435 ASSAY OF BLOOD CHLORIDE: CPT

## 2023-11-13 PROCEDURE — 85027 COMPLETE CBC AUTOMATED: CPT

## 2023-11-13 PROCEDURE — 85018 HEMOGLOBIN: CPT

## 2023-11-13 PROCEDURE — 85730 THROMBOPLASTIN TIME PARTIAL: CPT

## 2023-11-13 PROCEDURE — 85014 HEMATOCRIT: CPT

## 2023-11-13 PROCEDURE — 82330 ASSAY OF CALCIUM: CPT

## 2023-11-13 PROCEDURE — 84145 PROCALCITONIN (PCT): CPT

## 2023-11-13 PROCEDURE — 85610 PROTHROMBIN TIME: CPT

## 2023-11-13 PROCEDURE — 87449 NOS EACH ORGANISM AG IA: CPT

## 2023-11-13 PROCEDURE — 96375 TX/PRO/DX INJ NEW DRUG ADDON: CPT

## 2023-11-13 PROCEDURE — 84132 ASSAY OF SERUM POTASSIUM: CPT

## 2023-11-13 PROCEDURE — 84295 ASSAY OF SERUM SODIUM: CPT

## 2023-11-13 PROCEDURE — 85025 COMPLETE CBC W/AUTO DIFF WBC: CPT

## 2023-11-13 PROCEDURE — 87077 CULTURE AEROBIC IDENTIFY: CPT

## 2023-11-13 PROCEDURE — 94640 AIRWAY INHALATION TREATMENT: CPT

## 2023-11-13 PROCEDURE — 87086 URINE CULTURE/COLONY COUNT: CPT

## 2023-11-13 PROCEDURE — 87040 BLOOD CULTURE FOR BACTERIA: CPT

## 2023-11-13 PROCEDURE — 99285 EMERGENCY DEPT VISIT HI MDM: CPT

## 2023-11-13 PROCEDURE — 81001 URINALYSIS AUTO W/SCOPE: CPT

## 2023-12-07 ENCOUNTER — APPOINTMENT (OUTPATIENT)
Dept: PULMONOLOGY | Facility: CLINIC | Age: 88
End: 2023-12-07
Payer: MEDICARE

## 2023-12-07 PROCEDURE — 99205 OFFICE O/P NEW HI 60 MIN: CPT

## 2024-04-09 ENCOUNTER — APPOINTMENT (OUTPATIENT)
Dept: PULMONOLOGY | Facility: CLINIC | Age: 89
End: 2024-04-09
Payer: MEDICARE

## 2024-04-09 VITALS
HEART RATE: 86 BPM | SYSTOLIC BLOOD PRESSURE: 119 MMHG | TEMPERATURE: 97 F | OXYGEN SATURATION: 95 % | RESPIRATION RATE: 15 BRPM | DIASTOLIC BLOOD PRESSURE: 73 MMHG | HEIGHT: 70 IN

## 2024-04-09 DIAGNOSIS — G47.14 HYPERSOMNIA DUE TO MEDICAL CONDITION: ICD-10-CM

## 2024-04-09 PROCEDURE — 99214 OFFICE O/P EST MOD 30 MIN: CPT

## 2024-04-09 NOTE — HISTORY OF PRESENT ILLNESS
[FreeTextEntry1] : Hector Garcia is a 89 year old male w/MM not currently on therapy, dementia, HTN, macular degeneration who presents to the office for follow up.  He was first seen in the office 12/7/23 and the plan at that time to obtain additional outpatient records to determine need for further testing or empiric therapy.  On evaluation patient is accompanied by his aide who assisted with history.  According to the patient's aid, he sleeps approximately 12 hours a night, from 8pm-8 am.  The patient states he awakes a few times a night to urinate.  Upon waking, the patient appears very tired and is sometimes difficult to arouse from bed.  After breakfast the patient is often still tired and may go back to sleep until noon.  The patient may then sleep after lunch until dinner and appears tired throughout most of the day.  Daytime somnolence is interfering with his ability to function during the day.  The patient previously used CPAP therapy while living in [place] but cannot recall why it was discontinued.  His aide states that he has not had any stimulant therapy for about a year but when he initially moved from [place] to his current facility he had stopped using his stimulant therapy and was initially concerned that he did not have it.

## 2024-04-09 NOTE — PHYSICAL EXAM
[General Appearance - Well Developed] : well developed [General Appearance - Well Nourished] : well nourished [Normal Conjunctiva] : the conjunctiva exhibited no abnormalities [Neck Appearance] : the appearance of the neck was normal [Apical Impulse] : the apical impulse was normal [Respiration, Rhythm And Depth] : normal respiratory rhythm and effort [Auscultation Breath Sounds / Voice Sounds] : lungs were clear to auscultation bilaterally [Nail Clubbing] : no clubbing  or cyanosis of the fingernails [Musculoskeletal - Swelling] : no joint swelling seen [Skin Turgor] : normal skin turgor [] : no rash [No Focal Deficits] : no focal deficits [Affect] : the affect was normal

## 2024-04-09 NOTE — ASSESSMENT
[FreeTextEntry1] : Hector Garcia is a 89 year old male w/MM not currently on therapy, dementia, HTN, macular degeneration who presents to the office for follow up.  He was first seen in the office 12/7/23 and the plan at that time to obtain additional outpatient records to determine need for further testing or empiric therapy.  #ALYCIA  - prior HST performed 12/7/2022 at [facility] in [place]  showed with AHI 49.7/hr - he was prescribed cpap therpay but was intolerant and currently not on therapy.  He was prescribed modafinil and methylphenidate in the past for management of symptomatic eds.  - patient not currently on CPAP therapy, also not currently taking any stimulants  - had extensive discussion with patient, aid, and patient's son regarding advantages/disadvantages of undergoing repeat evaluation with HST and CPAP trial however, both pt, his son and his aid feel that this will not be feasbile in his current state and in his assited living environment.  Alternatively ,symptomatic therapy with a trial of  modafinil 100 mg daily was discussed to improve quality of life. Utimately decided to trial modafinil 100 mg daily and follow up in 2 months.  He was instructed to start with 1/2 tablet/day in the morning for the first 3 days then increase to a full tablet.  Potential adverse effects were discussed with his aide who is with him during the day. -patient instructed as above to start modafinil 50 mg daily for 3-4 days and, if no significant side effects, to increase to 100 mg daily (prescription for modafinil given today, 4/9/24)  RTC in 2 months to evaluate symptoms after initiating modafinil therapy  Patient seen and discussed w/Dr. José Luis Anderson [id number]

## 2024-04-10 RX ORDER — MODAFINIL 100 MG/1
100 TABLET ORAL
Qty: 30 | Refills: 3 | Status: ACTIVE | COMMUNITY
Start: 2024-04-09

## 2024-04-24 NOTE — ED PROVIDER NOTE - EKG ADDITIONAL QUESTION - PERFORMED INDEPENDENT VISUALIZATION
Liver enzymes have returned to normal. Blood counts are also normal. Please notify patient.    Lee Perez MD  
Yes
